# Patient Record
Sex: FEMALE | Race: WHITE | NOT HISPANIC OR LATINO | Employment: OTHER | URBAN - NONMETROPOLITAN AREA
[De-identification: names, ages, dates, MRNs, and addresses within clinical notes are randomized per-mention and may not be internally consistent; named-entity substitution may affect disease eponyms.]

---

## 2018-09-05 ENCOUNTER — TRANSCRIBE ORDERS (OUTPATIENT)
Dept: ADMINISTRATIVE | Facility: HOSPITAL | Age: 63
End: 2018-09-05

## 2018-09-05 ENCOUNTER — HOSPITAL ENCOUNTER (OUTPATIENT)
Dept: RADIATION ONCOLOGY | Facility: HOSPITAL | Age: 63
Setting detail: RADIATION/ONCOLOGY SERIES
End: 2018-09-05

## 2018-09-05 DIAGNOSIS — N63.0 BREAST NODULE: Primary | ICD-10-CM

## 2018-09-07 ENCOUNTER — APPOINTMENT (OUTPATIENT)
Dept: PREADMISSION TESTING | Facility: HOSPITAL | Age: 63
End: 2018-09-07

## 2018-09-07 VITALS
HEIGHT: 67 IN | WEIGHT: 142.64 LBS | OXYGEN SATURATION: 97 % | SYSTOLIC BLOOD PRESSURE: 130 MMHG | BODY MASS INDEX: 22.39 KG/M2 | RESPIRATION RATE: 18 BRPM | DIASTOLIC BLOOD PRESSURE: 78 MMHG | HEART RATE: 84 BPM

## 2018-09-07 LAB
ALBUMIN SERPL-MCNC: 4.3 G/DL (ref 3.5–5)
ALBUMIN/GLOB SERPL: 1.6 G/DL (ref 1.1–2.5)
ALP SERPL-CCNC: 69 U/L (ref 24–120)
ALT SERPL W P-5'-P-CCNC: 19 U/L (ref 0–54)
ANION GAP SERPL CALCULATED.3IONS-SCNC: 9 MMOL/L (ref 4–13)
AST SERPL-CCNC: 24 U/L (ref 7–45)
BASOPHILS # BLD AUTO: 0.03 10*3/MM3 (ref 0–0.2)
BASOPHILS NFR BLD AUTO: 0.5 % (ref 0–2)
BILIRUB SERPL-MCNC: 0.5 MG/DL (ref 0.1–1)
BUN BLD-MCNC: 18 MG/DL (ref 5–21)
BUN/CREAT SERPL: 26.9 (ref 7–25)
CALCIUM SPEC-SCNC: 9 MG/DL (ref 8.4–10.4)
CHLORIDE SERPL-SCNC: 105 MMOL/L (ref 98–110)
CO2 SERPL-SCNC: 30 MMOL/L (ref 24–31)
CREAT BLD-MCNC: 0.67 MG/DL (ref 0.5–1.4)
DEPRECATED RDW RBC AUTO: 40.5 FL (ref 40–54)
EOSINOPHIL # BLD AUTO: 0.06 10*3/MM3 (ref 0–0.7)
EOSINOPHIL NFR BLD AUTO: 1 % (ref 0–4)
ERYTHROCYTE [DISTWIDTH] IN BLOOD BY AUTOMATED COUNT: 12.9 % (ref 12–15)
GFR SERPL CREATININE-BSD FRML MDRD: 89 ML/MIN/1.73
GLOBULIN UR ELPH-MCNC: 2.7 GM/DL
GLUCOSE BLD-MCNC: 82 MG/DL (ref 70–100)
HCT VFR BLD AUTO: 41.7 % (ref 37–47)
HGB BLD-MCNC: 13.7 G/DL (ref 12–16)
IMM GRANULOCYTES # BLD: 0.02 10*3/MM3 (ref 0–0.03)
IMM GRANULOCYTES NFR BLD: 0.3 % (ref 0–5)
LYMPHOCYTES # BLD AUTO: 1.49 10*3/MM3 (ref 0.72–4.86)
LYMPHOCYTES NFR BLD AUTO: 25.5 % (ref 15–45)
MCH RBC QN AUTO: 28.2 PG (ref 28–32)
MCHC RBC AUTO-ENTMCNC: 32.9 G/DL (ref 33–36)
MCV RBC AUTO: 85.8 FL (ref 82–98)
MONOCYTES # BLD AUTO: 0.56 10*3/MM3 (ref 0.19–1.3)
MONOCYTES NFR BLD AUTO: 9.6 % (ref 4–12)
NEUTROPHILS # BLD AUTO: 3.68 10*3/MM3 (ref 1.87–8.4)
NEUTROPHILS NFR BLD AUTO: 63.1 % (ref 39–78)
NRBC BLD MANUAL-RTO: 0 /100 WBC (ref 0–0)
PLATELET # BLD AUTO: 214 10*3/MM3 (ref 130–400)
PMV BLD AUTO: 10 FL (ref 6–12)
POTASSIUM BLD-SCNC: 4.3 MMOL/L (ref 3.5–5.3)
PROT SERPL-MCNC: 7 G/DL (ref 6.3–8.7)
RBC # BLD AUTO: 4.86 10*6/MM3 (ref 4.2–5.4)
SODIUM BLD-SCNC: 144 MMOL/L (ref 135–145)
WBC NRBC COR # BLD: 5.84 10*3/MM3 (ref 4.8–10.8)

## 2018-09-07 PROCEDURE — 93005 ELECTROCARDIOGRAM TRACING: CPT

## 2018-09-07 PROCEDURE — 80053 COMPREHEN METABOLIC PANEL: CPT | Performed by: SPECIALIST

## 2018-09-07 PROCEDURE — 93010 ELECTROCARDIOGRAM REPORT: CPT | Performed by: INTERNAL MEDICINE

## 2018-09-07 PROCEDURE — 85025 COMPLETE CBC W/AUTO DIFF WBC: CPT | Performed by: SPECIALIST

## 2018-09-07 PROCEDURE — 36415 COLL VENOUS BLD VENIPUNCTURE: CPT

## 2018-09-07 RX ORDER — ASPIRIN 81 MG/1
81 TABLET, CHEWABLE ORAL DAILY
COMMUNITY

## 2018-09-07 RX ORDER — BIOTIN 1000 MCG
TABLET,CHEWABLE ORAL
COMMUNITY
End: 2020-04-30

## 2018-09-07 RX ORDER — SODIUM PHOSPHATE,MONO-DIBASIC 19G-7G/118
ENEMA (ML) RECTAL
COMMUNITY

## 2018-09-07 NOTE — DISCHARGE INSTRUCTIONS
DAY OF SURGERY INSTRUCTIONS        YOUR SURGEON: DR SAFIA EDOUARD    PROCEDURE: RIGHT BREAST LUMPECTOMY WITH SENTINEL LYMPH NODE WITH BIOPSY    DATE OF SURGERY: September 10, 2018    ARRIVAL TIME: AS DIRECTED BY OFFICE    DAY OF SURGERY TAKE ONLY THESE MEDICATIONS UNLESS OTHERWISE INSTRUCTED BY YOUR PHYSICIAN: NONE          MANAGING PAIN AFTER SURGERY    We know you are probably wondering what your pain will be like after surgery.  Following surgery it is unrealistic to expect you will not have pain.   Pain is how our bodies let us know that something is wrong or cautions us to be careful.  That said, our goal is to make your pain tolerable.    Methods we may use to treat your pain include (oral or IV medications, PCAs, epidurals, nerve blocks, etc.)   While some procedures require IV pain medications for a short time after surgery, transitioning to pain medications by mouth allows for better management of pain.   Your nurse will encourage you to take oral pain medications whenever possible.  IV medications work almost immediately, but only last a short while.  Taking medications by mouth allows for a more constant level of medication in your blood stream for a longer period of time.      Once your pain is out of control it is harder to get back under control.  It is important you are aware when your next dose of pain medication is due.  If you are admitted, your nurse may write the time of your next dose on the white board in your room to help you remember.      We are interested in your pain and encourage you to inform us about aggravating factors during your visit.   Many times a simple repositioning every few hours can make a big difference.    If your physician says it is okay, do not let your pain prevent you from getting out of bed. Be sure to call your nurse for assistance prior to getting up so you do not fall.      Before surgery, please decide your tolerable pain goal.  These faces help describe the  pain ratings we use on a 0-10 scale.   Be prepared to tell us your goal and whether or not you take pain or anxiety medications at home.          BEFORE YOU COME TO THE HOSPITAL  (Pre-op instructions)  • Do not eat, drink, smoke or chew gum after midnight the night before surgery.  This also includes no mints.  • Morning of surgery take only the medicines you have been instructed with a sip of water unless otherwise instructed  by your physician.  • Do not shave, wear makeup or dark nail polish.  • Remove all jewelry including rings.  • Leave anything you consider valuable at home.  • Leave your suitcase in the car until after your surgery.  • Bring the following with you if applicable:  o Picture ID and insurance, Medicare or Medicaid cards  o Co-pay/deductible required by insurance (cash, check, credit card)  o Copy of advance directive, living will or power-of- documents if not brought to PAT  o CPAP or BIPAP mask and tubing  o Relaxation aids (MP3 player, book, magazine)  • On the day of surgery check in at registration located at the main entrance of the hospital.       Outpatient Surgery Guidelines, Adult  Outpatient procedures are those for which the person having the procedure is allowed to go home the same day as the procedure. Various procedures are done on an outpatient basis. You should follow some general guidelines if you will be having an outpatient procedure.  LET YOUR HEALTH CARE PROVIDER KNOW ABOUT:  · Any allergies you have.  · All medicines you are taking, including vitamins, herbs, eye drops, creams, and over-the-counter medicines.  · Previous problems you or members of your family have had with the use of anesthetics.  · Any blood disorders you have.  · Previous surgeries you have had.  · Medical conditions you have.  RISKS AND COMPLICATIONS  Your health care provider will discuss possible risks and complications with you before surgery. Common risks and complications include:     · Problems due to the use of anesthetics.  · Blood loss and replacement (does not apply to minor surgical procedures).  · Temporary increase in pain due to surgery.  · Uncorrected pain or problems that the surgery was meant to correct.  · Infection.  · New damage.  BEFORE THE PROCEDURE  · Ask your health care provider about changing or stopping your regular medicines. You may need to stop taking certain medicines in the days or weeks before the procedure.  · Stop smoking at least 2 weeks before surgery. This lowers your risk for complications during and after surgery. Ask your health care provider for help with this if needed.  · Eat your usual meals and a light supper the day before surgery. Continue fluid intake. Do not drink alcohol.  · Do not eat or drink after midnight the night before your surgery.   · Arrange for someone to take you home and to stay with you for 24 hours after the procedure. Medicine given for your procedure may affect your ability to drive or to care for yourself.  · Call your health care provider's office if you develop an illness or problem that may prevent you from safely having your procedure.  AFTER THE PROCEDURE  After surgery, you will be taken to a recovery area, where your progress will be monitored. If there are no complications, you will be allowed to go home when you are awake, stable, and taking fluids well. You may have numbness around the surgical site. Healing will take some time. You will have tenderness at the surgical site and may have some swelling and bruising. You may also have some nausea.  HOME CARE INSTRUCTIONS  · Do not drive for 24 hours, or as directed by your health care provider. Do not drive while taking prescription pain medicines.  · Do not drink alcohol for 24 hours.  · Do not make important decisions or sign legal documents for 24 hours.  · You may resume a normal diet and activities as directed.  · Do not lift anything heavier than 10 pounds (4.5 kg) or  play contact sports until your health care provider says it is okay.  · Change your bandages (dressings) as directed.  · Only take over-the-counter or prescription medicines as directed by your health care provider.  · Follow up with your health care provider as directed.  SEEK MEDICAL CARE IF:  · You have increased bleeding (more than a small spot) from the surgical site.  · You have redness, swelling, or increasing pain in the wound.  · You see pus coming from the wound.  · You have a fever.  · You notice a bad smell coming from the wound or dressing.  · You feel lightheaded or faint.  · You develop a rash.  · You have trouble breathing.  · You develop allergies.  MAKE SURE YOU:  · Understand these instructions.  · Will watch your condition.  · Will get help right away if you are not doing well or get worse.     This information is not intended to replace advice given to you by your health care provider. Make sure you discuss any questions you have with your health care provider.     Document Released: 09/12/2002 Document Revised: 05/03/2016 Document Reviewed: 05/22/2014  [employer] Interactive Patient Education ©2016 [employer]       Fall Prevention in Hospitals, Adult  As a hospital patient, your condition and the treatments you receive can increase your risk for falls. Some additional risk factors for falls in a hospital include:  · Being in an unfamiliar environment.  · Being on bed rest.  · Your surgery.  · Taking certain medicines.  · Your tubing requirements, such as intravenous (IV) therapy or catheters.  It is important that you learn how to decrease fall risks while at the hospital. Below are important tips that can help prevent falls.  SAFETY TIPS FOR PREVENTING FALLS  Talk about your risk of falling.  · Ask your health care provider why you are at risk for falling. Is it your medicine, illness, tubing placement, or something else?  · Make a plan with your health care provider to keep you safe from  falls.  · Ask your health care provider or pharmacist about side effects of your medicines. Some medicines can make you dizzy or affect your coordination.  Ask for help.  · Ask for help before getting out of bed. You may need to press your call button.  · Ask for assistance in getting safely to the toilet.  · Ask for a walker or cane to be put at your bedside. Ask that most of the side rails on your bed be placed up before your health care provider leaves the room.  · Ask family or friends to sit with you.  · Ask for things that are out of your reach, such as your glasses, hearing aids, telephone, bedside table, or call button.  Follow these tips to avoid falling:  · Stay lying or seated, rather than standing, while waiting for help.  · Wear rubber-soled slippers or shoes whenever you walk in the hospital.  · Avoid quick, sudden movements.  ¨ Change positions slowly.  ¨ Sit on the side of your bed before standing.  ¨ Stand up slowly and wait before you start to walk.  · Let your health care provider know if there is a spill on the floor.  · Pay careful attention to the medical equipment, electrical cords, and tubes around you.  · When you need help, use your call button by your bed or in the bathroom. Wait for one of your health care providers to help you.  · If you feel dizzy or unsure of your footing, return to bed and wait for assistance.  · Avoid being distracted by the TV, telephone, or another person in your room.  · Do not lean or support yourself on rolling objects, such as IV poles or bedside tables.     This information is not intended to replace advice given to you by your health care provider. Make sure you discuss any questions you have with your health care provider.     Document Released: 12/15/2001 Document Revised: 01/08/2016 Document Reviewed: 08/25/2013  [employer] Interactive Patient Education ©2016 [employer]       Surgical Site Infections FAQs  What is a Surgical Site Infection (SSI)?  A  surgical site infection is an infection that occurs after surgery in the part of the body where the surgery took place. Most patients who have surgery do not develop an infection. However, infections develop in about 1 to 3 out of every 100 patients who have surgery.  Some of the common symptoms of a surgical site infection are:  · Redness and pain around the area where you had surgery  · Drainage of cloudy fluid from your surgical wound  · Fever  Can SSIs be treated?  Yes. Most surgical site infections can be treated with antibiotics. The antibiotic given to you depends on the bacteria (germs) causing the infection. Sometimes patients with SSIs also need another surgery to treat the infection.  What are some of the things that hospitals are doing to prevent SSIs?  To prevent SSIs, doctors, nurses, and other healthcare providers:  · Clean their hands and arms up to their elbows with an antiseptic agent just before the surgery.  · Clean their hands with soap and water or an alcohol-based hand rub before and after caring for each patient.  · May remove some of your hair immediately before your surgery using electric clippers if the hair is in the same area where the procedure will occur. They should not shave you with a razor.  · Wear special hair covers, masks, gowns, and gloves during surgery to keep the surgery area clean.  · Give you antibiotics before your surgery starts. In most cases, you should get antibiotics within 60 minutes before the surgery starts and the antibiotics should be stopped within 24 hours after surgery.  · Clean the skin at the site of your surgery with a special soap that kills germs.  What can I do to help prevent SSIs?  Before your surgery:  · Tell your doctor about other medical problems you may have. Health problems such as allergies, diabetes, and obesity could affect your surgery and your treatment.  · Quit smoking. Patients who smoke get more infections. Talk to your doctor about how  you can quit before your surgery.  · Do not shave near where you will have surgery. Shaving with a razor can irritate your skin and make it easier to develop an infection.  At the time of your surgery:  · Speak up if someone tries to shave you with a razor before surgery. Ask why you need to be shaved and talk with your surgeon if you have any concerns.  · Ask if you will get antibiotics before surgery.  After your surgery:  · Make sure that your healthcare providers clean their hands before examining you, either with soap and water or an alcohol-based hand rub.  · If you do not see your providers clean their hands, please ask them to do so.  · Family and friends who visit you should not touch the surgical wound or dressings.  · Family and friends should clean their hands with soap and water or an alcohol-based hand rub before and after visiting you. If you do not see them clean their hands, ask them to clean their hands.  What do I need to do when I go home from the hospital?  · Before you go home, your doctor or nurse should explain everything you need to know about taking care of your wound. Make sure you understand how to care for your wound before you leave the hospital.  · Always clean your hands before and after caring for your wound.  · Before you go home, make sure you know who to contact if you have questions or problems after you get home.  · If you have any symptoms of an infection, such as redness and pain at the surgery site, drainage, or fever, call your doctor immediately.  If you have additional questions, please ask your doctor or nurse.  Developed and co-sponsored by The Society for Healthcare Epidemiology of [place] (SHEA); Infectious Diseases Society of [place] (IDSA); American Hospital Association; Association for Professionals in Infection Control and Epidemiology (APIC); Centers for Disease Control and Prevention (CDC); and The Joint Commission.     This information is not intended to replace  advice given to you by your health care provider. Make sure you discuss any questions you have with your health care provider.     Document Released: 12/23/2014 Document Revised: 01/08/2016 Document Reviewed: 03/02/2016  [employer] Interactive Patient Education ©2016 [employer]       [facility]  CHG 4% Patient Instruction Sheet    Preparing the Skin Before Surgery  Preparing or “prepping” skin before surgery can reduce the risk of infection at the surgical site. To make the process easier,[facility] has chosen 4% Chlorhexidine Gluconate (CHG) antiseptic solution.   The steps below outline the prepping process and should be carefully followed.                                                                                                                                                      Prep the skin at the following time(s):                                                      We recommend you shower the night before surgery, and again the morning of surgery with the 4% CHG antiseptic solution using half of the bottle and a cloth each time.  Dress in clean clothes/sleepwear after showering.  See instructions below for application.          Do not apply any lotions or moisturizers.       Do not shave the area to be prepped for at least 2 days prior to surgery.    Clipping the hair may be done immediately prior to your surgery at the hospital    if needed.    Directions:  Thoroughly rinse your body with water.  Apply 4% CHG to a cloth and wash skin gently, paying special attention to the operative site.  Rinse again thoroughly.  Once you have begun using this product do not apply anything else to your skin. If itching or redness persists, rinse affected areas and discontinue use.    When using this product:  • Keep out of eyes, ears, and mouth.  • If solution should contact these areas, rinse out promptly and thoroughly with water.  • For external use only.  • Do not use in genital area, on your face or  head.      PATIENT/FAMILY/RESPONSIBLE PARTY VERBALIZES UNDERSTANDING OF ABOVE EDUCATION.  COPY OF PAIN SCALE GIVEN AND REVIEWED WITH VERBALIZED UNDERSTANDING.

## 2018-09-10 ENCOUNTER — ANESTHESIA EVENT (OUTPATIENT)
Dept: PERIOP | Facility: HOSPITAL | Age: 63
End: 2018-09-10

## 2018-09-10 ENCOUNTER — ANESTHESIA (OUTPATIENT)
Dept: PERIOP | Facility: HOSPITAL | Age: 63
End: 2018-09-10

## 2018-09-10 ENCOUNTER — HOSPITAL ENCOUNTER (OUTPATIENT)
Dept: NUCLEAR MEDICINE | Facility: HOSPITAL | Age: 63
Discharge: HOME | End: 2018-09-10
Attending: SPECIALIST

## 2018-09-10 ENCOUNTER — HOSPITAL ENCOUNTER (OUTPATIENT)
Facility: HOSPITAL | Age: 63
Setting detail: HOSPITAL OUTPATIENT SURGERY
Discharge: HOME | End: 2018-09-10
Attending: SPECIALIST | Admitting: SPECIALIST

## 2018-09-10 VITALS
RESPIRATION RATE: 16 BRPM | TEMPERATURE: 98.1 F | HEART RATE: 85 BPM | DIASTOLIC BLOOD PRESSURE: 61 MMHG | OXYGEN SATURATION: 96 % | SYSTOLIC BLOOD PRESSURE: 118 MMHG

## 2018-09-10 DIAGNOSIS — N63.0 BREAST NODULE: ICD-10-CM

## 2018-09-10 PROCEDURE — 25010000002 DEXAMETHASONE PER 1 MG: Performed by: NURSE ANESTHETIST, CERTIFIED REGISTERED

## 2018-09-10 PROCEDURE — 0 TECHNETIUM FILTERED SULFUR COLLOID: Performed by: SPECIALIST

## 2018-09-10 PROCEDURE — A9541 TC99M SULFUR COLLOID: HCPCS | Performed by: SPECIALIST

## 2018-09-10 PROCEDURE — 25010000002 FENTANYL CITRATE (PF) 250 MCG/5ML SOLUTION: Performed by: NURSE ANESTHETIST, CERTIFIED REGISTERED

## 2018-09-10 PROCEDURE — 25010000002 PROPOFOL 10 MG/ML EMULSION: Performed by: NURSE ANESTHETIST, CERTIFIED REGISTERED

## 2018-09-10 PROCEDURE — 25010000002 VANCOMYCIN PER 500 MG: Performed by: SPECIALIST

## 2018-09-10 PROCEDURE — 88307 TISSUE EXAM BY PATHOLOGIST: CPT | Performed by: SPECIALIST

## 2018-09-10 PROCEDURE — 25010000002 MIDAZOLAM PER 1 MG: Performed by: ANESTHESIOLOGY

## 2018-09-10 PROCEDURE — 78195 LYMPH SYSTEM IMAGING: CPT

## 2018-09-10 PROCEDURE — 25010000002 ONDANSETRON PER 1 MG: Performed by: NURSE ANESTHETIST, CERTIFIED REGISTERED

## 2018-09-10 RX ORDER — SCOLOPAMINE TRANSDERMAL SYSTEM 1 MG/1
1 PATCH, EXTENDED RELEASE TRANSDERMAL ONCE
Status: DISCONTINUED | OUTPATIENT
Start: 2018-09-10 | End: 2018-09-10 | Stop reason: HOSPADM

## 2018-09-10 RX ORDER — ONDANSETRON 2 MG/ML
INJECTION INTRAMUSCULAR; INTRAVENOUS AS NEEDED
Status: DISCONTINUED | OUTPATIENT
Start: 2018-09-10 | End: 2018-09-10 | Stop reason: SURG

## 2018-09-10 RX ORDER — HYDROCODONE BITARTRATE AND ACETAMINOPHEN 7.5; 325 MG/1; MG/1
1 TABLET ORAL EVERY 4 HOURS PRN
Qty: 40 TABLET | Refills: 0 | Status: SHIPPED | OUTPATIENT
Start: 2018-09-10 | End: 2019-05-13

## 2018-09-10 RX ORDER — MEPERIDINE HYDROCHLORIDE 25 MG/ML
12.5 INJECTION INTRAMUSCULAR; INTRAVENOUS; SUBCUTANEOUS
Status: DISCONTINUED | OUTPATIENT
Start: 2018-09-10 | End: 2018-09-10 | Stop reason: HOSPADM

## 2018-09-10 RX ORDER — OXYCODONE AND ACETAMINOPHEN 10; 325 MG/1; MG/1
1 TABLET ORAL ONCE AS NEEDED
Status: DISCONTINUED | OUTPATIENT
Start: 2018-09-10 | End: 2018-09-10 | Stop reason: HOSPADM

## 2018-09-10 RX ORDER — ACETAMINOPHEN 500 MG
1000 TABLET ORAL ONCE
Status: COMPLETED | OUTPATIENT
Start: 2018-09-10 | End: 2018-09-10

## 2018-09-10 RX ORDER — MIDAZOLAM HYDROCHLORIDE 1 MG/ML
1 INJECTION INTRAMUSCULAR; INTRAVENOUS
Status: DISCONTINUED | OUTPATIENT
Start: 2018-09-10 | End: 2018-09-10 | Stop reason: HOSPADM

## 2018-09-10 RX ORDER — PROPOFOL 10 MG/ML
VIAL (ML) INTRAVENOUS AS NEEDED
Status: DISCONTINUED | OUTPATIENT
Start: 2018-09-10 | End: 2018-09-10 | Stop reason: SURG

## 2018-09-10 RX ORDER — FENTANYL CITRATE 50 UG/ML
25 INJECTION, SOLUTION INTRAMUSCULAR; INTRAVENOUS AS NEEDED
Status: DISCONTINUED | OUTPATIENT
Start: 2018-09-10 | End: 2018-09-10 | Stop reason: HOSPADM

## 2018-09-10 RX ORDER — MIDAZOLAM HYDROCHLORIDE 1 MG/ML
2 INJECTION INTRAMUSCULAR; INTRAVENOUS
Status: DISCONTINUED | OUTPATIENT
Start: 2018-09-10 | End: 2018-09-10 | Stop reason: HOSPADM

## 2018-09-10 RX ORDER — LIDOCAINE HYDROCHLORIDE 20 MG/ML
INJECTION, SOLUTION INFILTRATION; PERINEURAL AS NEEDED
Status: DISCONTINUED | OUTPATIENT
Start: 2018-09-10 | End: 2018-09-10 | Stop reason: SURG

## 2018-09-10 RX ORDER — IBUPROFEN 600 MG/1
600 TABLET ORAL ONCE AS NEEDED
Status: DISCONTINUED | OUTPATIENT
Start: 2018-09-10 | End: 2018-09-10 | Stop reason: HOSPADM

## 2018-09-10 RX ORDER — SULFAMETHOXAZOLE AND TRIMETHOPRIM 800; 160 MG/1; MG/1
1 TABLET ORAL 2 TIMES DAILY
Qty: 20 TABLET | Refills: 0 | Status: SHIPPED | OUTPATIENT
Start: 2018-09-10 | End: 2018-09-20

## 2018-09-10 RX ORDER — NALOXONE HCL 0.4 MG/ML
0.4 VIAL (ML) INJECTION AS NEEDED
Status: DISCONTINUED | OUTPATIENT
Start: 2018-09-10 | End: 2018-09-10 | Stop reason: HOSPADM

## 2018-09-10 RX ORDER — ONDANSETRON HCL 8 MG
8 TABLET ORAL EVERY 8 HOURS PRN
Qty: 10 TABLET | Refills: 1 | Status: SHIPPED | OUTPATIENT
Start: 2018-09-10 | End: 2019-05-13

## 2018-09-10 RX ORDER — LIDOCAINE AND PRILOCAINE 25; 25 MG/G; MG/G
CREAM TOPICAL ONCE
Status: COMPLETED | OUTPATIENT
Start: 2018-09-10 | End: 2018-09-10

## 2018-09-10 RX ORDER — LABETALOL HYDROCHLORIDE 5 MG/ML
5 INJECTION, SOLUTION INTRAVENOUS
Status: DISCONTINUED | OUTPATIENT
Start: 2018-09-10 | End: 2018-09-10 | Stop reason: HOSPADM

## 2018-09-10 RX ORDER — FENTANYL CITRATE 50 UG/ML
INJECTION, SOLUTION INTRAMUSCULAR; INTRAVENOUS AS NEEDED
Status: DISCONTINUED | OUTPATIENT
Start: 2018-09-10 | End: 2018-09-10 | Stop reason: SURG

## 2018-09-10 RX ORDER — OXYCODONE AND ACETAMINOPHEN 7.5; 325 MG/1; MG/1
2 TABLET ORAL ONCE AS NEEDED
Status: DISCONTINUED | OUTPATIENT
Start: 2018-09-10 | End: 2018-09-10 | Stop reason: HOSPADM

## 2018-09-10 RX ORDER — SODIUM CHLORIDE 0.9 % (FLUSH) 0.9 %
1-10 SYRINGE (ML) INJECTION AS NEEDED
Status: DISCONTINUED | OUTPATIENT
Start: 2018-09-10 | End: 2018-09-10 | Stop reason: HOSPADM

## 2018-09-10 RX ORDER — ONDANSETRON 2 MG/ML
4 INJECTION INTRAMUSCULAR; INTRAVENOUS ONCE AS NEEDED
Status: DISCONTINUED | OUTPATIENT
Start: 2018-09-10 | End: 2018-09-10 | Stop reason: HOSPADM

## 2018-09-10 RX ORDER — SODIUM CHLORIDE 0.9 % (FLUSH) 0.9 %
3 SYRINGE (ML) INJECTION AS NEEDED
Status: DISCONTINUED | OUTPATIENT
Start: 2018-09-10 | End: 2018-09-10 | Stop reason: HOSPADM

## 2018-09-10 RX ORDER — SODIUM CHLORIDE, SODIUM LACTATE, POTASSIUM CHLORIDE, CALCIUM CHLORIDE 600; 310; 30; 20 MG/100ML; MG/100ML; MG/100ML; MG/100ML
1000 INJECTION, SOLUTION INTRAVENOUS CONTINUOUS
Status: DISCONTINUED | OUTPATIENT
Start: 2018-09-10 | End: 2018-09-10 | Stop reason: HOSPADM

## 2018-09-10 RX ORDER — MAGNESIUM HYDROXIDE 1200 MG/15ML
LIQUID ORAL AS NEEDED
Status: DISCONTINUED | OUTPATIENT
Start: 2018-09-10 | End: 2018-09-10 | Stop reason: HOSPADM

## 2018-09-10 RX ORDER — IPRATROPIUM BROMIDE AND ALBUTEROL SULFATE 2.5; .5 MG/3ML; MG/3ML
3 SOLUTION RESPIRATORY (INHALATION) ONCE AS NEEDED
Status: DISCONTINUED | OUTPATIENT
Start: 2018-09-10 | End: 2018-09-10 | Stop reason: HOSPADM

## 2018-09-10 RX ORDER — SODIUM CHLORIDE, SODIUM LACTATE, POTASSIUM CHLORIDE, CALCIUM CHLORIDE 600; 310; 30; 20 MG/100ML; MG/100ML; MG/100ML; MG/100ML
100 INJECTION, SOLUTION INTRAVENOUS CONTINUOUS
Status: DISCONTINUED | OUTPATIENT
Start: 2018-09-10 | End: 2018-09-10 | Stop reason: HOSPADM

## 2018-09-10 RX ORDER — DEXAMETHASONE SODIUM PHOSPHATE 4 MG/ML
INJECTION, SOLUTION INTRA-ARTICULAR; INTRALESIONAL; INTRAMUSCULAR; INTRAVENOUS; SOFT TISSUE AS NEEDED
Status: DISCONTINUED | OUTPATIENT
Start: 2018-09-10 | End: 2018-09-10 | Stop reason: SURG

## 2018-09-10 RX ORDER — FAMOTIDINE 10 MG/ML
20 INJECTION, SOLUTION INTRAVENOUS
Status: DISCONTINUED | OUTPATIENT
Start: 2018-09-10 | End: 2018-09-10 | Stop reason: HOSPADM

## 2018-09-10 RX ORDER — METOCLOPRAMIDE HYDROCHLORIDE 5 MG/ML
5 INJECTION INTRAMUSCULAR; INTRAVENOUS
Status: DISCONTINUED | OUTPATIENT
Start: 2018-09-10 | End: 2018-09-10 | Stop reason: HOSPADM

## 2018-09-10 RX ADMIN — SCOPOLAMINE 1 PATCH: 1 PATCH, EXTENDED RELEASE TRANSDERMAL at 09:37

## 2018-09-10 RX ADMIN — EPHEDRINE SULFATE 10 MG: 50 INJECTION INTRAMUSCULAR; INTRAVENOUS; SUBCUTANEOUS at 12:34

## 2018-09-10 RX ADMIN — FENTANYL CITRATE 100 MCG: 50 INJECTION INTRAMUSCULAR; INTRAVENOUS at 11:04

## 2018-09-10 RX ADMIN — LIDOCAINE AND PRILOCAINE: 25; 25 CREAM TOPICAL at 07:16

## 2018-09-10 RX ADMIN — TECHNETIUM TC 99M SULFUR COLLOID 1 DOSE: KIT at 08:15

## 2018-09-10 RX ADMIN — LIDOCAINE HYDROCHLORIDE 0.5 ML: 10 INJECTION, SOLUTION EPIDURAL; INFILTRATION; INTRACAUDAL; PERINEURAL at 06:30

## 2018-09-10 RX ADMIN — PROPOFOL 150 MG: 10 INJECTION, EMULSION INTRAVENOUS at 11:04

## 2018-09-10 RX ADMIN — ACETAMINOPHEN 1000 MG: 500 TABLET, FILM COATED ORAL at 09:37

## 2018-09-10 RX ADMIN — FAMOTIDINE 20 MG: 10 INJECTION INTRAVENOUS at 09:37

## 2018-09-10 RX ADMIN — FENTANYL CITRATE 50 MCG: 50 INJECTION INTRAMUSCULAR; INTRAVENOUS at 12:50

## 2018-09-10 RX ADMIN — LIDOCAINE HYDROCHLORIDE 100 MG: 20 INJECTION, SOLUTION INFILTRATION; PERINEURAL at 11:04

## 2018-09-10 RX ADMIN — FENTANYL CITRATE 25 MCG: 50 INJECTION INTRAMUSCULAR; INTRAVENOUS at 12:12

## 2018-09-10 RX ADMIN — FENTANYL CITRATE 25 MCG: 50 INJECTION INTRAMUSCULAR; INTRAVENOUS at 11:28

## 2018-09-10 RX ADMIN — ONDANSETRON HYDROCHLORIDE 4 MG: 2 SOLUTION INTRAMUSCULAR; INTRAVENOUS at 12:21

## 2018-09-10 RX ADMIN — VANCOMYCIN HYDROCHLORIDE 1000 MG: 1 INJECTION, POWDER, LYOPHILIZED, FOR SOLUTION INTRAVENOUS at 09:45

## 2018-09-10 RX ADMIN — EPHEDRINE SULFATE 15 MG: 50 INJECTION INTRAMUSCULAR; INTRAVENOUS; SUBCUTANEOUS at 11:36

## 2018-09-10 RX ADMIN — DEXAMETHASONE SODIUM PHOSPHATE 4 MG: 4 INJECTION, SOLUTION INTRAMUSCULAR; INTRAVENOUS at 12:30

## 2018-09-10 RX ADMIN — FENTANYL CITRATE 50 MCG: 50 INJECTION INTRAMUSCULAR; INTRAVENOUS at 12:45

## 2018-09-10 RX ADMIN — SODIUM CHLORIDE, POTASSIUM CHLORIDE, SODIUM LACTATE AND CALCIUM CHLORIDE 1000 ML: 600; 310; 30; 20 INJECTION, SOLUTION INTRAVENOUS at 06:30

## 2018-09-10 RX ADMIN — EPHEDRINE SULFATE 10 MG: 50 INJECTION INTRAMUSCULAR; INTRAVENOUS; SUBCUTANEOUS at 12:21

## 2018-09-10 RX ADMIN — SODIUM CHLORIDE, POTASSIUM CHLORIDE, SODIUM LACTATE AND CALCIUM CHLORIDE: 600; 310; 30; 20 INJECTION, SOLUTION INTRAVENOUS at 11:43

## 2018-09-10 NOTE — ANESTHESIA POSTPROCEDURE EVALUATION
Patient: Adriana Samuels    Procedure Summary     Date:  09/10/18 Room / Location:   [facility] OR  /  [facility]    Anesthesia Start:  1057 Anesthesia Stop:  1251    Procedure:  RIGHT BREAST LUMPECTOMY WITH SENTINEL LYMPH NODE BIOPSY, INJECTION AND SCAN (Right Breast) Diagnosis:  (RIGHT BREAST NODULE)    Surgeon:  Duglas Lawrence MD Provider:  Marj Goodwin CRNA    Anesthesia Type:  general ASA Status:  2          Anesthesia Type: general  Last vitals  BP   134/63 (09/10/18 1330)   Temp   98.1 °F (36.7 °C) (09/10/18 1317)   Pulse   86 (09/10/18 1330)   Resp   16 (09/10/18 1330)     SpO2   93 % (09/10/18 1330)     Post Anesthesia Care and Evaluation    Patient location during evaluation: PACU  Patient participation: complete - patient participated  Level of consciousness: awake and alert  Pain management: adequate  Airway patency: patent  Anesthetic complications: No anesthetic complications  PONV Status: none  Cardiovascular status: acceptable and hemodynamically stable  Respiratory status: acceptable  Hydration status: acceptable    Comments: Blood pressure 134/63, pulse 86, temperature 98.1 °F (36.7 °C), temperature source Temporal Artery , resp. rate 16, SpO2 93 %.    Patient discharged from PACU based upon Gold score. Please see RN notes for further details

## 2018-09-10 NOTE — DISCHARGE INSTRUCTIONS

## 2018-09-10 NOTE — OP NOTE
BREAST LUMPECTOMY WITH SENTINEL NODE BIOPSY  Procedure Note    Adriana Samuels  9/10/2018    Pre-op Diagnosis:   RIGHT BREAST NODULE    Post-op Diagnosis:   Right breast nodule upper outer breast also sentinel lymph node  Procedure/CPT® Codes:      Procedure(s):  RIGHT BREAST LUMPECTOMY WITH SENTINEL LYMPH NODE BIOPSY, INJECTION AND SCAN    Surgeon(s):  Duglas Lawrence MD    Anesthesia: General    Staff:   Circulator: Veda Medrano RN; Audra Gilmore RN  Scrub Person: Marj De La Rosa Zachary A; Roma Strange  Assistant: Daniel Talamantes  Other: Karolina Dougherty    Estimated Blood Loss: 50 mL    Specimens:                ID Type Source Tests Collected by Time   A : TISUE RIGHT BREAST SENTINEL NODE BX Tissue Breast, Right TISSUE PATHOLOGY EXAM Dugals Lawrence MD 9/10/2018 1210         Drains:   Closed/Suction Drain 1 Right;Lateral Breast Bulb 10 Fr. (Active)       Indications: Adriana Samuels is a 63-year-old female who has a nodule in her right upper outer breast.  Initial mammogram 1315 years prior Dr. Penn's office no abnormalities at that time and no mammograms since.  Recently she has a 5 x 6 mm nodule at the 10 o'clock position noted by ultrasound is very suspicious for malignancy.  There is a palpable lesion at this site consistent with the findings on mammogram she is  3 para 3family history, no breast feeding, no discharge no erythema.  She is advised of findings as well as the risk and benefits and desires to proceed with surgery options are to obtain a biopsy of the area versus excision with the area present I would definitely suggest excision if the biopsies were negative currently plan to excise the area as a lumpectomy and also complete a sentinel lymph node evaluation.  She is aware of the procedure the risk and benefits and gives her informed consent for surgery.      Findings: Right upper outer breast lumpectomy completed with Sobieski lymph node evaluation  accomplished blood loss was less than 50 mL.  A 10 mm flat Varun-Moya drain was placed without difficulty.      Complications-none    Procedure: Patient is placed in the supine position the surgical suite and after prepping and draping of been completed with alcohol and Betadine and Ioban was placed around the periphery.  A timeout was completed to assure orientation and patient  With this accomplished a curvilinear incision was made in the upper outer aspect of the right breast excising the overlying skin with the radio nuclide was injected and over the palpable lesion.  This accomplished a lumpectomy was completed in the upper outer aspect of the right breast incising down to the pectoralis major muscle medially and inferiorly and removing the breast tissue from the pectoralis major and the lateral aspect of the axilla.  With this accomplished attention was then turned toward the nasal dissection was completed superiorly the sentinel lymph nodes were noted and the sentinel lymph nodes were included in the resection with the sentinel lymph nodes in communication with the previous dissection and the sentinel lymph nodes were marked with silk suture to note the increased intensity of the 2 areas.  The medial aspect of the breast was marked as well as the inferior aspect to orient the pathologist and with the lesion marked the sentinel lymph nodes removed no further activity in the axilla the wound was irrigated with en bloc irrigation a 10 mm flat Varun-Moya drain was placed the deep dermis was reapproximated using simple inverted interrupted sutures of 3-0 Vicryl and the skin was enclosed using running subcuticular suture of 4-0 Vicryl.  Following this Mastisol, Steri-Strips, 4 x 4 and Tegaderm applied the patient was then extubated and transferred to the recovery room in good condition.       Date: 9/10/2018  Time: 12:53 PM    EMR Dragon/Transcription disclaimer: Much of this encounter note is an electronic  transcription/translation of spoken language to printed text. The electronic translation of spoken language may permit erroneous, or at times, nonsensical words or phrases to be inadvertently transcribed; although I have reviewed the note for such errors, some may still exist.

## 2018-09-10 NOTE — ANESTHESIA PROCEDURE NOTES
Airway  Airway not difficult    General Information and Staff    Patient location during procedure: OR  CRNA: SLIM CALLEJAS    Indications and Patient Condition  Indications for airway management: airway protection    Preoxygenated: yes  Mask difficulty assessment: 1 - vent by mask    Final Airway Details  Final airway type: supraglottic airway      Successful airway: classic  Size 3    Number of attempts at approach: 1

## 2018-09-10 NOTE — ANESTHESIA PREPROCEDURE EVALUATION
Anesthesia Evaluation     Patient summary reviewed and Nursing notes reviewed   history of anesthetic complications: PONV  NPO Solid Status: > 8 hours  NPO Liquid Status: > 8 hours           Airway   Mallampati: I  TM distance: >3 FB  Neck ROM: full  No difficulty expected  Dental      Pulmonary     breath sounds clear to auscultation  (-) asthma, sleep apnea  Cardiovascular   Exercise tolerance: good (4-7 METS)    ECG reviewed  Rhythm: regular  Rate: normal    (-) hypertension, CAD      Neuro/Psych  (-) seizures, TIA, CVA  GI/Hepatic/Renal/Endo    (+)   hepatitis,   (-) liver disease, no renal disease, diabetes    Musculoskeletal     Abdominal    Substance History      OB/GYN          Other      history of cancer (breast cancer) active                    Anesthesia Plan    ASA 2     general     intravenous induction   Anesthetic plan, all risks, benefits, and alternatives have been provided, discussed and informed consent has been obtained with: patient.

## 2018-09-18 ENCOUNTER — TRANSCRIBE ORDERS (OUTPATIENT)
Dept: ADMINISTRATIVE | Facility: HOSPITAL | Age: 63
End: 2018-09-18

## 2018-09-18 ENCOUNTER — DOCUMENTATION (OUTPATIENT)
Dept: RADIATION ONCOLOGY | Facility: HOSPITAL | Age: 63
End: 2018-09-18

## 2018-09-18 DIAGNOSIS — C50.411 MALIGNANT NEOPLASM OF UPPER-OUTER QUADRANT OF RIGHT FEMALE BREAST, UNSPECIFIED ESTROGEN RECEPTOR STATUS (HCC): Primary | ICD-10-CM

## 2018-09-18 NOTE — PROGRESS NOTES
Dr. Linton called about this patient today.  We have an appointment to see her later this week to advise us of his recommendations.  She had a 9 mm breast cancer which is invasive ductal carcinoma with 1 of 2 sentinel lymph nodes positive for metastatic disease.    Therefore she will be treated with adjuvant chemotherapy prior to any radiation.  Due to the positive lymph node I will anticipate treating the breast and regional lymph nodes over 6 weeks.  However we will defer radiation until completion of chemotherapy.

## 2018-09-19 PROBLEM — Z71.9 ENCOUNTER FOR CONSULTATION: Status: ACTIVE | Noted: 2018-09-19

## 2018-09-19 PROBLEM — Z98.890 S/P LYMPH NODE BIOPSY: Status: ACTIVE | Noted: 2018-09-19

## 2018-09-19 PROBLEM — Z98.890 S/P LUMPECTOMY, RIGHT BREAST: Status: ACTIVE | Noted: 2018-09-19

## 2018-09-19 PROBLEM — Z78.9 NON-SMOKER: Status: ACTIVE | Noted: 2018-09-19

## 2018-09-20 ENCOUNTER — CONSULT (OUTPATIENT)
Dept: RADIATION ONCOLOGY | Facility: HOSPITAL | Age: 63
End: 2018-09-20

## 2018-09-20 VITALS
WEIGHT: 142 LBS | BODY MASS INDEX: 22.29 KG/M2 | SYSTOLIC BLOOD PRESSURE: 126 MMHG | HEIGHT: 67 IN | DIASTOLIC BLOOD PRESSURE: 78 MMHG

## 2018-09-20 DIAGNOSIS — Z98.890 S/P LYMPH NODE BIOPSY: ICD-10-CM

## 2018-09-20 DIAGNOSIS — C50.411 MALIGNANT NEOPLASM OF UPPER-OUTER QUADRANT OF RIGHT BREAST IN FEMALE, ESTROGEN RECEPTOR POSITIVE (HCC): Primary | ICD-10-CM

## 2018-09-20 DIAGNOSIS — Z17.0 MALIGNANT NEOPLASM OF UPPER-OUTER QUADRANT OF RIGHT BREAST IN FEMALE, ESTROGEN RECEPTOR POSITIVE (HCC): Primary | ICD-10-CM

## 2018-09-20 DIAGNOSIS — Z98.890 S/P LUMPECTOMY, RIGHT BREAST: ICD-10-CM

## 2018-09-20 DIAGNOSIS — Z71.9 ENCOUNTER FOR CONSULTATION: ICD-10-CM

## 2018-09-20 DIAGNOSIS — Z78.9 NON-SMOKER: ICD-10-CM

## 2018-09-20 PROCEDURE — G0463 HOSPITAL OUTPT CLINIC VISIT: HCPCS | Performed by: RADIOLOGY

## 2018-09-21 ENCOUNTER — TRANSCRIBE ORDERS (OUTPATIENT)
Dept: ADMINISTRATIVE | Facility: HOSPITAL | Age: 63
End: 2018-09-21

## 2018-09-21 ENCOUNTER — HOSPITAL ENCOUNTER (OUTPATIENT)
Dept: CT IMAGING | Facility: HOSPITAL | Age: 63
Discharge: HOME | End: 2018-09-21
Attending: INTERNAL MEDICINE

## 2018-09-21 ENCOUNTER — HOSPITAL ENCOUNTER (OUTPATIENT)
Dept: NUCLEAR MEDICINE | Facility: HOSPITAL | Age: 63
Discharge: HOME | End: 2018-09-21
Attending: INTERNAL MEDICINE

## 2018-09-21 DIAGNOSIS — C50.411 MALIGNANT NEOPLASM OF UPPER-OUTER QUADRANT OF RIGHT FEMALE BREAST, UNSPECIFIED ESTROGEN RECEPTOR STATUS (HCC): ICD-10-CM

## 2018-09-21 DIAGNOSIS — K76.89 HEPATIC CYST: Primary | ICD-10-CM

## 2018-09-21 LAB — CREAT BLDA-MCNC: 0.7 MG/DL (ref 0.6–1.3)

## 2018-09-21 PROCEDURE — 82565 ASSAY OF CREATININE: CPT

## 2018-09-21 PROCEDURE — 78306 BONE IMAGING WHOLE BODY: CPT

## 2018-09-21 PROCEDURE — 71260 CT THORAX DX C+: CPT

## 2018-09-21 PROCEDURE — 74177 CT ABD & PELVIS W/CONTRAST: CPT

## 2018-09-21 PROCEDURE — 25010000002 IOPAMIDOL 61 % SOLUTION: Performed by: INTERNAL MEDICINE

## 2018-09-21 PROCEDURE — 0 TECHNETIUM OXIDRONATE KIT: Performed by: INTERNAL MEDICINE

## 2018-09-21 PROCEDURE — A9561 TC99M OXIDRONATE: HCPCS | Performed by: INTERNAL MEDICINE

## 2018-09-21 RX ADMIN — IOPAMIDOL 100 ML: 612 INJECTION, SOLUTION INTRAVENOUS at 11:08

## 2018-09-21 RX ADMIN — TECHNETIUM TC 99M OXIDRONATE 1 DOSE: 3.15 INJECTION, POWDER, LYOPHILIZED, FOR SOLUTION INTRAVENOUS at 11:34

## 2018-09-25 LAB
CYTO UR: NORMAL
LAB AP CASE REPORT: NORMAL
LAB AP CLINICAL INFORMATION: NORMAL
LAB AP INTRADEPARTMENTAL CONSULT: NORMAL
LAB AP SYNOPTIC CHECKLIST: NORMAL
PATH REPORT.FINAL DX SPEC: NORMAL
PATH REPORT.GROSS SPEC: NORMAL

## 2018-09-26 ENCOUNTER — HOSPITAL ENCOUNTER (OUTPATIENT)
Dept: ULTRASOUND IMAGING | Facility: HOSPITAL | Age: 63
Discharge: HOME | End: 2018-09-26
Attending: INTERNAL MEDICINE | Admitting: INTERNAL MEDICINE

## 2018-09-26 DIAGNOSIS — K76.89 HEPATIC CYST: ICD-10-CM

## 2018-09-26 PROCEDURE — 76705 ECHO EXAM OF ABDOMEN: CPT

## 2018-12-28 PROBLEM — Z71.89 ENCOUNTER TO DISCUSS PROCEDURE: Status: ACTIVE | Noted: 2018-12-28

## 2018-12-31 ENCOUNTER — HOSPITAL ENCOUNTER (OUTPATIENT)
Dept: RADIATION ONCOLOGY | Facility: HOSPITAL | Age: 63
Setting detail: RADIATION/ONCOLOGY SERIES
End: 2018-12-31

## 2018-12-31 ENCOUNTER — OFFICE VISIT (OUTPATIENT)
Dept: RADIATION ONCOLOGY | Facility: HOSPITAL | Age: 63
End: 2018-12-31

## 2018-12-31 ENCOUNTER — HOSPITAL ENCOUNTER (OUTPATIENT)
Dept: RADIATION ONCOLOGY | Facility: HOSPITAL | Age: 63
Discharge: HOME | End: 2018-12-31

## 2018-12-31 VITALS
HEIGHT: 66 IN | DIASTOLIC BLOOD PRESSURE: 72 MMHG | WEIGHT: 146 LBS | BODY MASS INDEX: 23.46 KG/M2 | SYSTOLIC BLOOD PRESSURE: 120 MMHG

## 2018-12-31 DIAGNOSIS — C50.411 MALIGNANT NEOPLASM OF UPPER-OUTER QUADRANT OF RIGHT BREAST IN FEMALE, ESTROGEN RECEPTOR POSITIVE (HCC): Primary | ICD-10-CM

## 2018-12-31 DIAGNOSIS — Z71.89 ENCOUNTER TO DISCUSS PROCEDURE: ICD-10-CM

## 2018-12-31 DIAGNOSIS — Z17.0 MALIGNANT NEOPLASM OF UPPER-OUTER QUADRANT OF RIGHT BREAST IN FEMALE, ESTROGEN RECEPTOR POSITIVE (HCC): Primary | ICD-10-CM

## 2018-12-31 DIAGNOSIS — Z78.9 NON-SMOKER: ICD-10-CM

## 2018-12-31 DIAGNOSIS — Z98.890 S/P LYMPH NODE BIOPSY: ICD-10-CM

## 2018-12-31 DIAGNOSIS — Z98.890 S/P LUMPECTOMY, RIGHT BREAST: ICD-10-CM

## 2018-12-31 PROCEDURE — 77290 THER RAD SIMULAJ FIELD CPLX: CPT | Performed by: RADIOLOGY

## 2018-12-31 PROCEDURE — 77334 RADIATION TREATMENT AID(S): CPT | Performed by: RADIOLOGY

## 2018-12-31 RX ORDER — MULTIPLE VITAMINS W/ MINERALS TAB 9MG-400MCG
1 TAB ORAL DAILY
COMMUNITY

## 2019-01-02 ENCOUNTER — HOSPITAL ENCOUNTER (OUTPATIENT)
Dept: RADIATION ONCOLOGY | Facility: HOSPITAL | Age: 64
Setting detail: RADIATION/ONCOLOGY SERIES
End: 2019-01-02

## 2019-01-08 ENCOUNTER — LAB REQUISITION (OUTPATIENT)
Dept: LAB | Facility: HOSPITAL | Age: 64
End: 2019-01-08

## 2019-01-08 DIAGNOSIS — Z00.00 ENCOUNTER FOR GENERAL ADULT MEDICAL EXAMINATION WITHOUT ABNORMAL FINDINGS: ICD-10-CM

## 2019-01-08 LAB — CEA SERPL-MCNC: 1.12 NG/ML (ref 0–5)

## 2019-01-08 PROCEDURE — 77295 3-D RADIOTHERAPY PLAN: CPT | Performed by: RADIOLOGY

## 2019-01-08 PROCEDURE — 77300 RADIATION THERAPY DOSE PLAN: CPT | Performed by: RADIOLOGY

## 2019-01-08 PROCEDURE — 82378 CARCINOEMBRYONIC ANTIGEN: CPT | Performed by: INTERNAL MEDICINE

## 2019-01-08 PROCEDURE — 77334 RADIATION TREATMENT AID(S): CPT | Performed by: RADIOLOGY

## 2019-01-15 ENCOUNTER — HOSPITAL ENCOUNTER (OUTPATIENT)
Dept: RADIATION ONCOLOGY | Facility: HOSPITAL | Age: 64
Discharge: HOME | End: 2019-01-15

## 2019-01-15 PROCEDURE — 77412 RADIATION TX DELIVERY LVL 3: CPT | Performed by: RADIOLOGY

## 2019-01-15 PROCEDURE — 77280 THER RAD SIMULAJ FIELD SMPL: CPT | Performed by: RADIOLOGY

## 2019-01-16 ENCOUNTER — HOSPITAL ENCOUNTER (OUTPATIENT)
Dept: RADIATION ONCOLOGY | Facility: HOSPITAL | Age: 64
Discharge: HOME | End: 2019-01-16

## 2019-01-16 PROCEDURE — 77412 RADIATION TX DELIVERY LVL 3: CPT | Performed by: RADIOLOGY

## 2019-01-16 PROCEDURE — 77417 THER RADIOLOGY PORT IMAGE(S): CPT | Performed by: RADIOLOGY

## 2019-01-17 ENCOUNTER — HOSPITAL ENCOUNTER (OUTPATIENT)
Dept: RADIATION ONCOLOGY | Facility: HOSPITAL | Age: 64
Discharge: HOME | End: 2019-01-17

## 2019-01-17 PROCEDURE — 77412 RADIATION TX DELIVERY LVL 3: CPT | Performed by: RADIOLOGY

## 2019-01-17 PROCEDURE — 77336 RADIATION PHYSICS CONSULT: CPT | Performed by: RADIOLOGY

## 2019-01-18 ENCOUNTER — HOSPITAL ENCOUNTER (OUTPATIENT)
Dept: RADIATION ONCOLOGY | Facility: HOSPITAL | Age: 64
Discharge: HOME | End: 2019-01-18

## 2019-01-18 PROCEDURE — 77412 RADIATION TX DELIVERY LVL 3: CPT | Performed by: RADIOLOGY

## 2019-01-21 ENCOUNTER — HOSPITAL ENCOUNTER (OUTPATIENT)
Dept: RADIATION ONCOLOGY | Facility: HOSPITAL | Age: 64
Discharge: HOME | End: 2019-01-21

## 2019-01-21 PROCEDURE — 77412 RADIATION TX DELIVERY LVL 3: CPT | Performed by: RADIOLOGY

## 2019-01-22 ENCOUNTER — HOSPITAL ENCOUNTER (OUTPATIENT)
Dept: RADIATION ONCOLOGY | Facility: HOSPITAL | Age: 64
Discharge: HOME | End: 2019-01-22

## 2019-01-22 PROCEDURE — 77412 RADIATION TX DELIVERY LVL 3: CPT | Performed by: RADIOLOGY

## 2019-01-23 ENCOUNTER — HOSPITAL ENCOUNTER (OUTPATIENT)
Dept: RADIATION ONCOLOGY | Facility: HOSPITAL | Age: 64
Discharge: HOME | End: 2019-01-23

## 2019-01-23 PROCEDURE — 77417 THER RADIOLOGY PORT IMAGE(S): CPT | Performed by: RADIOLOGY

## 2019-01-23 PROCEDURE — 77412 RADIATION TX DELIVERY LVL 3: CPT | Performed by: RADIOLOGY

## 2019-01-24 ENCOUNTER — HOSPITAL ENCOUNTER (OUTPATIENT)
Dept: RADIATION ONCOLOGY | Facility: HOSPITAL | Age: 64
Discharge: HOME | End: 2019-01-24

## 2019-01-24 PROCEDURE — 77336 RADIATION PHYSICS CONSULT: CPT | Performed by: RADIOLOGY

## 2019-01-24 PROCEDURE — 77412 RADIATION TX DELIVERY LVL 3: CPT | Performed by: RADIOLOGY

## 2019-01-25 ENCOUNTER — HOSPITAL ENCOUNTER (OUTPATIENT)
Dept: RADIATION ONCOLOGY | Facility: HOSPITAL | Age: 64
Discharge: HOME | End: 2019-01-25

## 2019-01-25 PROCEDURE — 77412 RADIATION TX DELIVERY LVL 3: CPT | Performed by: RADIOLOGY

## 2019-01-28 ENCOUNTER — HOSPITAL ENCOUNTER (OUTPATIENT)
Dept: RADIATION ONCOLOGY | Facility: HOSPITAL | Age: 64
Discharge: HOME | End: 2019-01-28

## 2019-01-28 PROCEDURE — 77412 RADIATION TX DELIVERY LVL 3: CPT | Performed by: RADIOLOGY

## 2019-01-29 ENCOUNTER — HOSPITAL ENCOUNTER (OUTPATIENT)
Dept: RADIATION ONCOLOGY | Facility: HOSPITAL | Age: 64
Discharge: HOME | End: 2019-01-29

## 2019-01-29 PROCEDURE — 77412 RADIATION TX DELIVERY LVL 3: CPT | Performed by: RADIOLOGY

## 2019-01-30 ENCOUNTER — HOSPITAL ENCOUNTER (OUTPATIENT)
Dept: RADIATION ONCOLOGY | Facility: HOSPITAL | Age: 64
Discharge: HOME | End: 2019-01-30

## 2019-01-30 PROCEDURE — 77412 RADIATION TX DELIVERY LVL 3: CPT | Performed by: RADIOLOGY

## 2019-01-31 ENCOUNTER — HOSPITAL ENCOUNTER (OUTPATIENT)
Dept: RADIATION ONCOLOGY | Facility: HOSPITAL | Age: 64
Discharge: HOME | End: 2019-01-31

## 2019-01-31 PROCEDURE — 77412 RADIATION TX DELIVERY LVL 3: CPT | Performed by: RADIOLOGY

## 2019-01-31 PROCEDURE — 77336 RADIATION PHYSICS CONSULT: CPT | Performed by: RADIOLOGY

## 2019-02-01 ENCOUNTER — HOSPITAL ENCOUNTER (OUTPATIENT)
Dept: RADIATION ONCOLOGY | Facility: HOSPITAL | Age: 64
Setting detail: RADIATION/ONCOLOGY SERIES
End: 2019-02-01

## 2019-02-01 ENCOUNTER — HOSPITAL ENCOUNTER (OUTPATIENT)
Dept: RADIATION ONCOLOGY | Facility: HOSPITAL | Age: 64
Discharge: HOME | End: 2019-02-01

## 2019-02-01 PROCEDURE — 77412 RADIATION TX DELIVERY LVL 3: CPT | Performed by: RADIOLOGY

## 2019-02-04 ENCOUNTER — HOSPITAL ENCOUNTER (OUTPATIENT)
Dept: RADIATION ONCOLOGY | Facility: HOSPITAL | Age: 64
Discharge: HOME | End: 2019-02-04

## 2019-02-04 PROCEDURE — 77412 RADIATION TX DELIVERY LVL 3: CPT | Performed by: RADIOLOGY

## 2019-02-05 ENCOUNTER — HOSPITAL ENCOUNTER (OUTPATIENT)
Dept: RADIATION ONCOLOGY | Facility: HOSPITAL | Age: 64
Discharge: HOME | End: 2019-02-05

## 2019-02-05 PROCEDURE — 77412 RADIATION TX DELIVERY LVL 3: CPT | Performed by: RADIOLOGY

## 2019-02-06 ENCOUNTER — HOSPITAL ENCOUNTER (OUTPATIENT)
Dept: RADIATION ONCOLOGY | Facility: HOSPITAL | Age: 64
Discharge: HOME | End: 2019-02-06

## 2019-02-06 PROCEDURE — 77412 RADIATION TX DELIVERY LVL 3: CPT | Performed by: RADIOLOGY

## 2019-02-06 PROCEDURE — 77417 THER RADIOLOGY PORT IMAGE(S): CPT | Performed by: RADIOLOGY

## 2019-02-07 ENCOUNTER — HOSPITAL ENCOUNTER (OUTPATIENT)
Dept: RADIATION ONCOLOGY | Facility: HOSPITAL | Age: 64
Discharge: HOME | End: 2019-02-07

## 2019-02-07 PROCEDURE — 77336 RADIATION PHYSICS CONSULT: CPT | Performed by: RADIOLOGY

## 2019-02-07 PROCEDURE — 77412 RADIATION TX DELIVERY LVL 3: CPT | Performed by: RADIOLOGY

## 2019-02-08 ENCOUNTER — HOSPITAL ENCOUNTER (OUTPATIENT)
Dept: RADIATION ONCOLOGY | Facility: HOSPITAL | Age: 64
Discharge: HOME | End: 2019-02-08

## 2019-02-08 PROCEDURE — 77412 RADIATION TX DELIVERY LVL 3: CPT | Performed by: RADIOLOGY

## 2019-02-11 ENCOUNTER — HOSPITAL ENCOUNTER (OUTPATIENT)
Dept: RADIATION ONCOLOGY | Facility: HOSPITAL | Age: 64
Discharge: HOME | End: 2019-02-11

## 2019-02-11 PROCEDURE — 77412 RADIATION TX DELIVERY LVL 3: CPT | Performed by: RADIOLOGY

## 2019-02-12 ENCOUNTER — HOSPITAL ENCOUNTER (OUTPATIENT)
Dept: RADIATION ONCOLOGY | Facility: HOSPITAL | Age: 64
Discharge: HOME | End: 2019-02-12

## 2019-02-12 PROCEDURE — 77334 RADIATION TREATMENT AID(S): CPT | Performed by: RADIOLOGY

## 2019-02-12 PROCEDURE — 77300 RADIATION THERAPY DOSE PLAN: CPT | Performed by: RADIOLOGY

## 2019-02-12 PROCEDURE — 77412 RADIATION TX DELIVERY LVL 3: CPT | Performed by: RADIOLOGY

## 2019-02-13 ENCOUNTER — HOSPITAL ENCOUNTER (OUTPATIENT)
Dept: RADIATION ONCOLOGY | Facility: HOSPITAL | Age: 64
Discharge: HOME | End: 2019-02-13

## 2019-02-13 PROCEDURE — 77412 RADIATION TX DELIVERY LVL 3: CPT | Performed by: RADIOLOGY

## 2019-02-13 PROCEDURE — 77417 THER RADIOLOGY PORT IMAGE(S): CPT | Performed by: RADIOLOGY

## 2019-02-14 ENCOUNTER — HOSPITAL ENCOUNTER (OUTPATIENT)
Dept: RADIATION ONCOLOGY | Facility: HOSPITAL | Age: 64
Discharge: HOME | End: 2019-02-14

## 2019-02-14 PROCEDURE — 77412 RADIATION TX DELIVERY LVL 3: CPT | Performed by: RADIOLOGY

## 2019-02-14 PROCEDURE — 77336 RADIATION PHYSICS CONSULT: CPT | Performed by: RADIOLOGY

## 2019-02-15 ENCOUNTER — HOSPITAL ENCOUNTER (OUTPATIENT)
Dept: RADIATION ONCOLOGY | Facility: HOSPITAL | Age: 64
Discharge: HOME | End: 2019-02-15

## 2019-02-15 PROCEDURE — 77412 RADIATION TX DELIVERY LVL 3: CPT | Performed by: RADIOLOGY

## 2019-02-18 ENCOUNTER — HOSPITAL ENCOUNTER (OUTPATIENT)
Dept: RADIATION ONCOLOGY | Facility: HOSPITAL | Age: 64
Discharge: HOME | End: 2019-02-18

## 2019-02-18 PROCEDURE — 77412 RADIATION TX DELIVERY LVL 3: CPT | Performed by: RADIOLOGY

## 2019-02-19 ENCOUNTER — HOSPITAL ENCOUNTER (OUTPATIENT)
Dept: RADIATION ONCOLOGY | Facility: HOSPITAL | Age: 64
Discharge: HOME | End: 2019-02-19

## 2019-02-19 PROCEDURE — 77412 RADIATION TX DELIVERY LVL 3: CPT | Performed by: RADIOLOGY

## 2019-02-20 ENCOUNTER — HOSPITAL ENCOUNTER (OUTPATIENT)
Dept: RADIATION ONCOLOGY | Facility: HOSPITAL | Age: 64
Discharge: HOME | End: 2019-02-20

## 2019-02-20 PROCEDURE — 77412 RADIATION TX DELIVERY LVL 3: CPT | Performed by: RADIOLOGY

## 2019-02-21 ENCOUNTER — HOSPITAL ENCOUNTER (OUTPATIENT)
Dept: RADIATION ONCOLOGY | Facility: HOSPITAL | Age: 64
Discharge: HOME | End: 2019-02-21

## 2019-02-21 PROCEDURE — 77417 THER RADIOLOGY PORT IMAGE(S): CPT | Performed by: RADIOLOGY

## 2019-02-21 PROCEDURE — 77412 RADIATION TX DELIVERY LVL 3: CPT | Performed by: RADIOLOGY

## 2019-02-21 PROCEDURE — 77336 RADIATION PHYSICS CONSULT: CPT | Performed by: RADIOLOGY

## 2019-02-22 ENCOUNTER — HOSPITAL ENCOUNTER (OUTPATIENT)
Dept: RADIATION ONCOLOGY | Facility: HOSPITAL | Age: 64
Discharge: HOME | End: 2019-02-22

## 2019-02-22 PROCEDURE — 77412 RADIATION TX DELIVERY LVL 3: CPT | Performed by: RADIOLOGY

## 2019-02-25 ENCOUNTER — HOSPITAL ENCOUNTER (OUTPATIENT)
Dept: RADIATION ONCOLOGY | Facility: HOSPITAL | Age: 64
Discharge: HOME | End: 2019-02-25

## 2019-02-25 PROCEDURE — 77412 RADIATION TX DELIVERY LVL 3: CPT | Performed by: RADIOLOGY

## 2019-03-07 ENCOUNTER — TRANSCRIBE ORDERS (OUTPATIENT)
Dept: ONCOLOGY | Facility: CLINIC | Age: 64
End: 2019-03-07

## 2019-03-07 DIAGNOSIS — C50.411 MALIGNANT NEOPLASM OF UPPER-OUTER QUADRANT OF RIGHT FEMALE BREAST, UNSPECIFIED ESTROGEN RECEPTOR STATUS (HCC): Primary | ICD-10-CM

## 2019-04-02 ENCOUNTER — APPOINTMENT (OUTPATIENT)
Dept: LAB | Facility: HOSPITAL | Age: 64
End: 2019-04-02

## 2019-04-02 LAB
ALBUMIN SERPL-MCNC: 4.2 G/DL (ref 3.5–5)
ALBUMIN/GLOB SERPL: 1.4 G/DL (ref 1.1–2.5)
ALP SERPL-CCNC: 74 U/L (ref 24–120)
ALT SERPL W P-5'-P-CCNC: <15 U/L (ref 0–54)
ANION GAP SERPL CALCULATED.3IONS-SCNC: 9 MMOL/L (ref 4–13)
AST SERPL-CCNC: 24 U/L (ref 7–45)
BASOPHILS # BLD AUTO: 0.03 10*3/MM3 (ref 0–0.2)
BASOPHILS NFR BLD AUTO: 0.8 % (ref 0–2)
BILIRUB SERPL-MCNC: 0.7 MG/DL (ref 0.1–1)
BUN BLD-MCNC: 21 MG/DL (ref 5–21)
BUN/CREAT SERPL: 33.9 (ref 7–25)
CALCIUM SPEC-SCNC: 9.8 MG/DL (ref 8.4–10.4)
CEA SERPL-MCNC: 1.83 NG/ML (ref 0–5)
CHLORIDE SERPL-SCNC: 101 MMOL/L (ref 98–110)
CO2 SERPL-SCNC: 32 MMOL/L (ref 24–31)
CREAT BLD-MCNC: 0.62 MG/DL (ref 0.5–1.4)
DEPRECATED RDW RBC AUTO: 38.2 FL (ref 40–54)
EOSINOPHIL # BLD AUTO: 0.13 10*3/MM3 (ref 0–0.7)
EOSINOPHIL NFR BLD AUTO: 3.3 % (ref 0–4)
ERYTHROCYTE [DISTWIDTH] IN BLOOD BY AUTOMATED COUNT: 12.5 % (ref 12–15)
GFR SERPL CREATININE-BSD FRML MDRD: 97 ML/MIN/1.73
GLOBULIN UR ELPH-MCNC: 3.1 GM/DL
GLUCOSE BLD-MCNC: 96 MG/DL (ref 70–100)
HCT VFR BLD AUTO: 42.9 % (ref 37–47)
HGB BLD-MCNC: 14.1 G/DL (ref 12–16)
IMM GRANULOCYTES # BLD AUTO: 0.01 10*3/MM3 (ref 0–0.05)
IMM GRANULOCYTES NFR BLD AUTO: 0.3 % (ref 0–5)
LYMPHOCYTES # BLD AUTO: 0.62 10*3/MM3 (ref 0.72–4.86)
LYMPHOCYTES NFR BLD AUTO: 15.9 % (ref 15–45)
MCH RBC QN AUTO: 27.3 PG (ref 28–32)
MCHC RBC AUTO-ENTMCNC: 32.9 G/DL (ref 33–36)
MCV RBC AUTO: 83 FL (ref 82–98)
MONOCYTES # BLD AUTO: 0.48 10*3/MM3 (ref 0.19–1.3)
MONOCYTES NFR BLD AUTO: 12.3 % (ref 4–12)
NEUTROPHILS # BLD AUTO: 2.63 10*3/MM3 (ref 1.87–8.4)
NEUTROPHILS NFR BLD AUTO: 67.4 % (ref 39–78)
NRBC BLD AUTO-RTO: 0 /100 WBC (ref 0–0)
PLATELET # BLD AUTO: 208 10*3/MM3 (ref 130–400)
PMV BLD AUTO: 9.5 FL (ref 6–12)
POTASSIUM BLD-SCNC: 4.6 MMOL/L (ref 3.5–5.3)
PROT SERPL-MCNC: 7.3 G/DL (ref 6.3–8.7)
RBC # BLD AUTO: 5.17 10*6/MM3 (ref 4.2–5.4)
SODIUM BLD-SCNC: 142 MMOL/L (ref 135–145)
WBC NRBC COR # BLD: 3.9 10*3/MM3 (ref 4.8–10.8)

## 2019-04-02 PROCEDURE — 85025 COMPLETE CBC W/AUTO DIFF WBC: CPT | Performed by: INTERNAL MEDICINE

## 2019-04-02 PROCEDURE — 80053 COMPREHEN METABOLIC PANEL: CPT | Performed by: INTERNAL MEDICINE

## 2019-04-02 PROCEDURE — 36415 COLL VENOUS BLD VENIPUNCTURE: CPT | Performed by: INTERNAL MEDICINE

## 2019-04-02 PROCEDURE — 86300 IMMUNOASSAY TUMOR CA 15-3: CPT | Performed by: INTERNAL MEDICINE

## 2019-04-02 PROCEDURE — 82378 CARCINOEMBRYONIC ANTIGEN: CPT | Performed by: INTERNAL MEDICINE

## 2019-04-03 LAB — CANCER AG27-29 SERPL-ACNC: 13.1 U/ML (ref 0–38.6)

## 2019-04-10 ENCOUNTER — HOSPITAL ENCOUNTER (OUTPATIENT)
Dept: RADIATION ONCOLOGY | Facility: HOSPITAL | Age: 64
Setting detail: RADIATION/ONCOLOGY SERIES
End: 2019-04-10

## 2019-04-10 ENCOUNTER — TRANSCRIBE ORDERS (OUTPATIENT)
Dept: ONCOLOGY | Facility: CLINIC | Age: 64
End: 2019-04-10

## 2019-04-10 DIAGNOSIS — C50.411 MALIGNANT NEOPLASM OF UPPER-OUTER QUADRANT OF RIGHT FEMALE BREAST, UNSPECIFIED ESTROGEN RECEPTOR STATUS (HCC): Primary | ICD-10-CM

## 2019-04-10 PROBLEM — Z92.3 HISTORY OF RADIATION THERAPY: Status: ACTIVE | Noted: 2019-04-10

## 2019-04-11 ENCOUNTER — OFFICE VISIT (OUTPATIENT)
Dept: RADIATION ONCOLOGY | Facility: HOSPITAL | Age: 64
End: 2019-04-11

## 2019-04-11 ENCOUNTER — TRANSCRIBE ORDERS (OUTPATIENT)
Dept: ADMINISTRATIVE | Facility: HOSPITAL | Age: 64
End: 2019-04-11

## 2019-04-11 VITALS
BODY MASS INDEX: 22.82 KG/M2 | WEIGHT: 142 LBS | HEIGHT: 66 IN | SYSTOLIC BLOOD PRESSURE: 108 MMHG | DIASTOLIC BLOOD PRESSURE: 76 MMHG

## 2019-04-11 DIAGNOSIS — Z78.9 NON-SMOKER: ICD-10-CM

## 2019-04-11 DIAGNOSIS — Z78.0 POSTMENOPAUSAL: Primary | ICD-10-CM

## 2019-04-11 DIAGNOSIS — C50.411 MALIGNANT NEOPLASM OF UPPER-OUTER QUADRANT OF RIGHT BREAST IN FEMALE, ESTROGEN RECEPTOR POSITIVE (HCC): Primary | ICD-10-CM

## 2019-04-11 DIAGNOSIS — Z98.890 S/P LUMPECTOMY, RIGHT BREAST: ICD-10-CM

## 2019-04-11 DIAGNOSIS — Z17.0 MALIGNANT NEOPLASM OF UPPER-OUTER QUADRANT OF RIGHT BREAST IN FEMALE, ESTROGEN RECEPTOR POSITIVE (HCC): Primary | ICD-10-CM

## 2019-04-11 DIAGNOSIS — Z92.3 HISTORY OF RADIATION THERAPY: ICD-10-CM

## 2019-04-11 DIAGNOSIS — Z79.811 PROPHYLACTIC USE OF LETROZOLE (FEMARA): ICD-10-CM

## 2019-04-11 DIAGNOSIS — Z98.890 S/P LYMPH NODE BIOPSY: ICD-10-CM

## 2019-04-11 PROCEDURE — G0463 HOSPITAL OUTPT CLINIC VISIT: HCPCS | Performed by: RADIOLOGY

## 2019-04-17 ENCOUNTER — HOSPITAL ENCOUNTER (OUTPATIENT)
Dept: BONE DENSITY | Facility: HOSPITAL | Age: 64
Discharge: HOME | End: 2019-04-17
Admitting: INTERNAL MEDICINE

## 2019-04-17 DIAGNOSIS — Z78.0 POSTMENOPAUSAL: ICD-10-CM

## 2019-04-17 PROCEDURE — 77080 DXA BONE DENSITY AXIAL: CPT

## 2019-04-23 DIAGNOSIS — C50.411 MALIGNANT NEOPLASM OF UPPER-OUTER QUADRANT OF RIGHT BREAST IN FEMALE, ESTROGEN RECEPTOR POSITIVE (HCC): Primary | ICD-10-CM

## 2019-04-23 DIAGNOSIS — Z17.0 MALIGNANT NEOPLASM OF UPPER-OUTER QUADRANT OF RIGHT BREAST IN FEMALE, ESTROGEN RECEPTOR POSITIVE (HCC): Primary | ICD-10-CM

## 2019-04-26 ENCOUNTER — HOSPITAL ENCOUNTER (OUTPATIENT)
Dept: MAMMOGRAPHY | Facility: HOSPITAL | Age: 64
Discharge: HOME | End: 2019-04-26
Admitting: RADIOLOGY

## 2019-04-26 ENCOUNTER — HOSPITAL ENCOUNTER (OUTPATIENT)
Dept: ULTRASOUND IMAGING | Facility: HOSPITAL | Age: 64
Discharge: HOME | End: 2019-04-26

## 2019-04-26 DIAGNOSIS — Z17.0 MALIGNANT NEOPLASM OF UPPER-OUTER QUADRANT OF RIGHT BREAST IN FEMALE, ESTROGEN RECEPTOR POSITIVE (HCC): ICD-10-CM

## 2019-04-26 DIAGNOSIS — Z98.890 S/P LUMPECTOMY, RIGHT BREAST: ICD-10-CM

## 2019-04-26 DIAGNOSIS — Z78.9 NON-SMOKER: ICD-10-CM

## 2019-04-26 DIAGNOSIS — Z98.890 S/P LYMPH NODE BIOPSY: ICD-10-CM

## 2019-04-26 DIAGNOSIS — C50.411 MALIGNANT NEOPLASM OF UPPER-OUTER QUADRANT OF RIGHT BREAST IN FEMALE, ESTROGEN RECEPTOR POSITIVE (HCC): ICD-10-CM

## 2019-04-26 DIAGNOSIS — Z92.3 HISTORY OF RADIATION THERAPY: ICD-10-CM

## 2019-04-26 PROCEDURE — 77065 DX MAMMO INCL CAD UNI: CPT

## 2019-04-26 PROCEDURE — G0279 TOMOSYNTHESIS, MAMMO: HCPCS

## 2019-04-26 RX ORDER — PROCHLORPERAZINE MALEATE 10 MG
10 TABLET ORAL EVERY 6 HOURS PRN
COMMUNITY
End: 2019-05-13

## 2019-05-13 ENCOUNTER — OFFICE VISIT (OUTPATIENT)
Dept: GASTROENTEROLOGY | Facility: CLINIC | Age: 64
End: 2019-05-13

## 2019-05-13 VITALS
BODY MASS INDEX: 23.14 KG/M2 | HEART RATE: 80 BPM | WEIGHT: 144 LBS | TEMPERATURE: 96.7 F | OXYGEN SATURATION: 98 % | DIASTOLIC BLOOD PRESSURE: 68 MMHG | HEIGHT: 66 IN | SYSTOLIC BLOOD PRESSURE: 126 MMHG

## 2019-05-13 DIAGNOSIS — Z12.11 ENCOUNTER FOR SCREENING FOR MALIGNANT NEOPLASM OF COLON: Primary | ICD-10-CM

## 2019-05-13 PROCEDURE — S0285 CNSLT BEFORE SCREEN COLONOSC: HCPCS | Performed by: NURSE PRACTITIONER

## 2019-05-13 NOTE — PROGRESS NOTES
[facility]enterology    Primary Physician Arie Renteria MD     Referring Physician: Dr. Linton     5/13/2019    Adriana Samuels   1955      Chief Complaint   Patient presents with   • Colonoscopy       Subjective     HPI    Adriana Samuels is a 63 y.o. female who presents as a referral for preventative maintenance. She has no complaints of nausea or vomiting. No change in bowels. No wt loss. No BRBPR. No melena. No abdominal pain.       She has never had a colonoscopy.  The patient does not have history of colon polyps. The patient does not have history of colon cancer.   There is no family history of colon polyps. There is no family history of colon cancer.     Past Medical History:   Diagnosis Date   • Breast cancer (CMS/HCC) 09/2018    right breast   • Cancer (CMS/HCC)    • Drug therapy 09/2018-11/2018   • Herpes zoster    • Hx of radiation therapy 02/2019    right breast   • Menorrhagia    • Mononucleosis    • PONV (postoperative nausea and vomiting)    • Shingles        Past Surgical History:   Procedure Laterality Date   • BREAST BIOPSY Right 09/2018    positive   • BREAST LUMPECTOMY Right 09/2018    lumpectomy with chemo and rad tx   • BREAST LUMPECTOMY WITH SENTINEL NODE BIOPSY Right 9/10/2018    Procedure: RIGHT BREAST LUMPECTOMY WITH SENTINEL LYMPH NODE BIOPSY, INJECTION AND SCAN;  Surgeon: Duglas Lawrence MD;  Location: [facility] OR;  Service: General   • HERNIA REPAIR     • HYDROCELE EXCISION / REPAIR     • LASER ABLATION OF THE CERVIX     • WRIST FUSION         Outpatient Medications Marked as Taking for the 5/13/19 encounter (Office Visit) with Jacy Garza APRN   Medication Sig Dispense Refill   • aspirin 81 MG chewable tablet Chew 81 mg Daily.     • Biotin 1000 MCG chewable tablet Chew.     • Calcium Carb-Cholecalciferol (CALCIUM 1000 + D PO) Take  by mouth.     • glucosamine sulfate 500 MG capsule capsule Take  by mouth 3 (Three) Times a Day With Meals.     • Loratadine  (CLARITIN PO) Take  by mouth.     • Multiple Vitamins-Minerals (MULTIVITAMIN WITH MINERALS) tablet tablet Take 1 tablet by mouth Daily.     • Probiotic Product (PROBIOTIC ADVANCED PO) Take  by mouth.     • Probiotic Product (PROBIOTIC PO) Take  by mouth.         Allergies   Allergen Reactions   • Bactrim [Sulfamethoxazole-Trimethoprim] Rash   • Cortisone Rash   • Decadron [Dexamethasone] Hives   • Hydrocortisone Hives     Oral or IV   • Erythromycin Nausea Only and Rash       Social History     Socioeconomic History   • Marital status:      Spouse name: Not on file   • Number of children: Not on file   • Years of education: Not on file   • Highest education level: Not on file   Tobacco Use   • Smoking status: Never Smoker   • Smokeless tobacco: Never Used   Substance and Sexual Activity   • Alcohol use: Yes     Comment: RARELY   • Drug use: No   • Sexual activity: Defer       Family History   Problem Relation Age of Onset   • Skin cancer Father    • Breast cancer Neg Hx    • Colon cancer Neg Hx    • Colon polyps Neg Hx        Review of Systems   Constitutional: Negative for appetite change, chills, fatigue, fever and unexpected weight change.   HENT: Negative for sore throat and trouble swallowing.    Eyes: Negative for visual disturbance.   Respiratory: Negative for cough, chest tightness, shortness of breath and wheezing.    Cardiovascular: Negative for chest pain and palpitations.   Gastrointestinal: Negative for abdominal distention, abdominal pain, anal bleeding, blood in stool, constipation, diarrhea, nausea, rectal pain and vomiting.        As mentioned in hpi   Genitourinary: Negative for difficulty urinating and hematuria.   Musculoskeletal: Negative for arthralgias and back pain.   Skin: Negative for color change and rash.   Neurological: Negative for dizziness, seizures, syncope, light-headedness and headaches.   Hematological: Negative for adenopathy.   Psychiatric/Behavioral: Negative for  confusion. The patient is not nervous/anxious.        Objective     Vitals:    05/13/19 1441   BP: 126/68   Pulse: 80   Temp: 96.7 °F (35.9 °C)   SpO2: 98%         05/13/19  1441   Weight: 65.3 kg (144 lb)     Body mass index is 23.24 kg/m².    Physical Exam   Constitutional: She appears well-developed and well-nourished. No distress.   HENT:   Head: Normocephalic and atraumatic.   Eyes: EOM are normal. No scleral icterus.   Neck: Neck supple. No JVD present.   Cardiovascular: Normal rate, regular rhythm and normal heart sounds.   Pulmonary/Chest: Effort normal and breath sounds normal.   Abdominal: Soft. Bowel sounds are normal. She exhibits no distension. There is no tenderness.   Musculoskeletal: Normal range of motion. She exhibits no deformity.   Neurological: She is alert.   Skin: Skin is warm and dry. No rash noted.   Psychiatric: She has a normal mood and affect. Her behavior is normal.   Vitals reviewed.      Imaging Results (most recent)     None          Assessment/Plan     Adriana was seen today for colonoscopy.    Diagnoses and all orders for this visit:    Encounter for screening for malignant neoplasm of colon  -     Case Request; Standing  -     Case Request    Other orders  -     Follow Anesthesia Guidelines / Standing Orders; Future  -     Implement Anesthesia Orders Day of Procedure; Standing  -     Obtain Informed Consent; Standing  -     Obtain Informed Consent; Future  -     polyethylene glycol (GOLYTELY) 236 g solution; Take 4,000 mL by mouth 1 (One) Time for 1 dose. Take as directed per instruction sheet.     Plan for colonoscopy.          Body mass index is 23.24 kg/m².    Patient's Body mass index is 23.24 kg/m². BMI is within normal parameters. No follow-up required..      COLONOSCOPY WITH ANESTHESIA (N/A)  All risks, benefits, alternatives, and indications of colonoscopy procedure have been discussed with the patient. Risks to include perforation of the colon requiring possible surgery or  colostomy, risk of bleeding from biopsies or removal of colon tissue, possibility of missing a colon polyp or cancer, or adverse drug reaction.  Benefits to include the diagnosis and management of disease of the colon and rectum. Alternatives to include barium enema, radiographic evaluation, lab testing or no intervention. Pt verbalizes understanding and agrees.         TRISTA Roman      EMR Dragon/transcription disclaimer:  Much of this encounter note is electronic transcription/translation of spoken language to printed text.  The electronic translation of spoken language may be erroneous, or at times, nonsensical words or phrases may be inadvertently transcribed.  Although I have reviewed the note for such errors, some may still exist.

## 2019-06-06 ENCOUNTER — TELEPHONE (OUTPATIENT)
Dept: GASTROENTEROLOGY | Facility: CLINIC | Age: 64
End: 2019-06-06

## 2019-06-06 ENCOUNTER — HOSPITAL ENCOUNTER (OUTPATIENT)
Facility: HOSPITAL | Age: 64
Setting detail: HOSPITAL OUTPATIENT SURGERY
Discharge: HOME | End: 2019-06-06
Attending: INTERNAL MEDICINE | Admitting: INTERNAL MEDICINE

## 2019-06-06 ENCOUNTER — ANESTHESIA EVENT (OUTPATIENT)
Dept: GASTROENTEROLOGY | Facility: HOSPITAL | Age: 64
End: 2019-06-06

## 2019-06-06 ENCOUNTER — ANESTHESIA (OUTPATIENT)
Dept: GASTROENTEROLOGY | Facility: HOSPITAL | Age: 64
End: 2019-06-06

## 2019-06-06 VITALS
BODY MASS INDEX: 22.5 KG/M2 | OXYGEN SATURATION: 97 % | HEART RATE: 82 BPM | DIASTOLIC BLOOD PRESSURE: 67 MMHG | HEIGHT: 66 IN | WEIGHT: 140 LBS | SYSTOLIC BLOOD PRESSURE: 114 MMHG | TEMPERATURE: 97.6 F | RESPIRATION RATE: 12 BRPM

## 2019-06-06 PROCEDURE — 25010000002 PROPOFOL 10 MG/ML EMULSION: Performed by: NURSE ANESTHETIST, CERTIFIED REGISTERED

## 2019-06-06 PROCEDURE — G0121 COLON CA SCRN NOT HI RSK IND: HCPCS | Performed by: INTERNAL MEDICINE

## 2019-06-06 RX ORDER — SODIUM CHLORIDE 9 MG/ML
500 INJECTION, SOLUTION INTRAVENOUS CONTINUOUS PRN
Status: DISCONTINUED | OUTPATIENT
Start: 2019-06-06 | End: 2019-06-06 | Stop reason: HOSPADM

## 2019-06-06 RX ORDER — LIDOCAINE HYDROCHLORIDE 20 MG/ML
INJECTION, SOLUTION INFILTRATION; PERINEURAL AS NEEDED
Status: DISCONTINUED | OUTPATIENT
Start: 2019-06-06 | End: 2019-06-06 | Stop reason: SURG

## 2019-06-06 RX ORDER — SODIUM CHLORIDE 0.9 % (FLUSH) 0.9 %
3 SYRINGE (ML) INJECTION AS NEEDED
Status: DISCONTINUED | OUTPATIENT
Start: 2019-06-06 | End: 2019-06-06 | Stop reason: HOSPADM

## 2019-06-06 RX ORDER — SODIUM CHLORIDE 0.9 % (FLUSH) 0.9 %
3 SYRINGE (ML) INJECTION EVERY 12 HOURS SCHEDULED
Status: CANCELLED | OUTPATIENT
Start: 2019-06-06

## 2019-06-06 RX ORDER — PROPOFOL 10 MG/ML
VIAL (ML) INTRAVENOUS AS NEEDED
Status: DISCONTINUED | OUTPATIENT
Start: 2019-06-06 | End: 2019-06-06 | Stop reason: SURG

## 2019-06-06 RX ORDER — SODIUM CHLORIDE 0.9 % (FLUSH) 0.9 %
3-10 SYRINGE (ML) INJECTION AS NEEDED
Status: CANCELLED | OUTPATIENT
Start: 2019-06-06

## 2019-06-06 RX ORDER — SODIUM CHLORIDE 9 MG/ML
100 INJECTION, SOLUTION INTRAVENOUS CONTINUOUS
Status: CANCELLED | OUTPATIENT
Start: 2019-06-06

## 2019-06-06 RX ORDER — ONDANSETRON 2 MG/ML
4 INJECTION INTRAMUSCULAR; INTRAVENOUS ONCE AS NEEDED
Status: DISCONTINUED | OUTPATIENT
Start: 2019-06-06 | End: 2019-06-06 | Stop reason: HOSPADM

## 2019-06-06 RX ADMIN — PROPOFOL 50 MG: 10 INJECTION, EMULSION INTRAVENOUS at 09:12

## 2019-06-06 RX ADMIN — LIDOCAINE HYDROCHLORIDE 100 MG: 20 INJECTION, SOLUTION INFILTRATION; PERINEURAL at 08:53

## 2019-06-06 RX ADMIN — PROPOFOL 100 MG: 10 INJECTION, EMULSION INTRAVENOUS at 08:53

## 2019-06-06 RX ADMIN — SODIUM CHLORIDE 500 ML: 9 INJECTION, SOLUTION INTRAVENOUS at 07:42

## 2019-06-06 RX ADMIN — PROPOFOL 50 MG: 10 INJECTION, EMULSION INTRAVENOUS at 09:04

## 2019-06-06 RX ADMIN — PROPOFOL 50 MG: 10 INJECTION, EMULSION INTRAVENOUS at 08:58

## 2019-06-06 RX ADMIN — PROPOFOL 50 MG: 10 INJECTION, EMULSION INTRAVENOUS at 09:08

## 2019-06-06 NOTE — ANESTHESIA PREPROCEDURE EVALUATION
Anesthesia Evaluation     history of anesthetic complications: PONV  NPO Solid Status: > 8 hours  NPO Liquid Status: > 4 hours           Airway   Neck ROM: full  No difficulty expected  Dental      Pulmonary - normal exam   Cardiovascular - normal exam        Neuro/Psych  GI/Hepatic/Renal/Endo      Musculoskeletal     Abdominal  - normal exam   Substance History      OB/GYN          Other      history of cancer remission                    Anesthesia Plan    ASA 2     MAC     intravenous induction   Anesthetic plan, all risks, benefits, and alternatives have been provided, discussed and informed consent has been obtained with: patient.

## 2019-06-06 NOTE — ANESTHESIA POSTPROCEDURE EVALUATION
Patient: Adriana Samuels    Procedure Summary     Date:  06/06/19 Room / Location:  [facility] ENDOSCOPY 2 / [facility]    Anesthesia Start:  0851 Anesthesia Stop:  0924    Procedure:  COLONOSCOPY WITH ANESTHESIA (N/A ) Diagnosis:       Encounter for screening for malignant neoplasm of colon      (Encounter for screening for malignant neoplasm of colon [Z12.11])    Surgeon:  Brock Raman MD Provider:  KAMILA Forbes CRNA    Anesthesia Type:  MAC ASA Status:  2          Anesthesia Type: MAC  Last vitals  BP   118/69 (06/06/19 0731)   Temp   97.6 °F (36.4 °C) (06/06/19 0731)   Pulse   82 (06/06/19 0731)   Resp   18 (06/06/19 0731)     SpO2   98 % (06/06/19 0731)     Post Anesthesia Care and Evaluation    Patient location during evaluation: PACU  Patient participation: complete - patient participated  Level of consciousness: awake and alert  Pain score: 0  Pain management: adequate  Airway patency: patent  Anesthetic complications: No anesthetic complications    Cardiovascular status: acceptable and stable  Respiratory status: acceptable and unassisted  Hydration status: acceptable

## 2019-07-30 ENCOUNTER — APPOINTMENT (OUTPATIENT)
Dept: LAB | Facility: HOSPITAL | Age: 64
End: 2019-07-30

## 2019-07-30 LAB
ALBUMIN SERPL-MCNC: 4.2 G/DL (ref 3.5–5)
ALBUMIN/GLOB SERPL: 1.4 G/DL (ref 1.1–2.5)
ALP SERPL-CCNC: 75 U/L (ref 24–120)
ALT SERPL W P-5'-P-CCNC: 18 U/L (ref 0–54)
ANION GAP SERPL CALCULATED.3IONS-SCNC: 6 MMOL/L (ref 4–13)
AST SERPL-CCNC: 26 U/L (ref 7–45)
BASOPHILS # BLD AUTO: 0.02 10*3/MM3 (ref 0–0.2)
BASOPHILS NFR BLD AUTO: 0.5 % (ref 0–2)
BILIRUB SERPL-MCNC: 0.7 MG/DL (ref 0.1–1)
BUN BLD-MCNC: 15 MG/DL (ref 5–21)
BUN/CREAT SERPL: 21.1 (ref 7–25)
CALCIUM SPEC-SCNC: 9.3 MG/DL (ref 8.4–10.4)
CEA SERPL-MCNC: 2.27 NG/ML (ref 0–5)
CHLORIDE SERPL-SCNC: 106 MMOL/L (ref 98–110)
CO2 SERPL-SCNC: 31 MMOL/L (ref 24–31)
CREAT BLD-MCNC: 0.71 MG/DL (ref 0.5–1.4)
DEPRECATED RDW RBC AUTO: 42.5 FL (ref 40–54)
EOSINOPHIL # BLD AUTO: 0.16 10*3/MM3 (ref 0–0.7)
EOSINOPHIL NFR BLD AUTO: 4.3 % (ref 0–4)
ERYTHROCYTE [DISTWIDTH] IN BLOOD BY AUTOMATED COUNT: 13.2 % (ref 12–15)
GFR SERPL CREATININE-BSD FRML MDRD: 83 ML/MIN/1.73
GLOBULIN UR ELPH-MCNC: 3 GM/DL
GLUCOSE BLD-MCNC: 68 MG/DL (ref 70–100)
HCT VFR BLD AUTO: 42.4 % (ref 37–47)
HGB BLD-MCNC: 13.8 G/DL (ref 12–16)
IMM GRANULOCYTES # BLD AUTO: 0.01 10*3/MM3 (ref 0–0.05)
IMM GRANULOCYTES NFR BLD AUTO: 0.3 % (ref 0–5)
LYMPHOCYTES # BLD AUTO: 0.72 10*3/MM3 (ref 0.72–4.86)
LYMPHOCYTES NFR BLD AUTO: 19.3 % (ref 15–45)
MCH RBC QN AUTO: 28.6 PG (ref 28–32)
MCHC RBC AUTO-ENTMCNC: 32.5 G/DL (ref 33–36)
MCV RBC AUTO: 87.8 FL (ref 82–98)
MONOCYTES # BLD AUTO: 0.38 10*3/MM3 (ref 0.19–1.3)
MONOCYTES NFR BLD AUTO: 10.2 % (ref 4–12)
NEUTROPHILS # BLD AUTO: 2.45 10*3/MM3 (ref 1.87–8.4)
NEUTROPHILS NFR BLD AUTO: 65.4 % (ref 39–78)
NRBC BLD AUTO-RTO: 0 /100 WBC (ref 0–0.2)
PLATELET # BLD AUTO: 198 10*3/MM3 (ref 130–400)
PMV BLD AUTO: 9.5 FL (ref 6–12)
POTASSIUM BLD-SCNC: 4.4 MMOL/L (ref 3.5–5.3)
PROT SERPL-MCNC: 7.2 G/DL (ref 6.3–8.7)
RBC # BLD AUTO: 4.83 10*6/MM3 (ref 4.2–5.4)
SODIUM BLD-SCNC: 143 MMOL/L (ref 135–145)
WBC NRBC COR # BLD: 3.74 10*3/MM3 (ref 4.8–10.8)

## 2019-07-30 PROCEDURE — 36415 COLL VENOUS BLD VENIPUNCTURE: CPT | Performed by: INTERNAL MEDICINE

## 2019-07-30 PROCEDURE — 80053 COMPREHEN METABOLIC PANEL: CPT | Performed by: INTERNAL MEDICINE

## 2019-07-30 PROCEDURE — 82378 CARCINOEMBRYONIC ANTIGEN: CPT | Performed by: INTERNAL MEDICINE

## 2019-07-30 PROCEDURE — 85025 COMPLETE CBC W/AUTO DIFF WBC: CPT | Performed by: INTERNAL MEDICINE

## 2019-07-30 PROCEDURE — 86300 IMMUNOASSAY TUMOR CA 15-3: CPT | Performed by: INTERNAL MEDICINE

## 2019-07-31 LAB — CANCER AG27-29 SERPL-ACNC: 19.2 U/ML (ref 0–38.6)

## 2019-08-06 ENCOUNTER — TRANSCRIBE ORDERS (OUTPATIENT)
Dept: ADMINISTRATIVE | Facility: HOSPITAL | Age: 64
End: 2019-08-06

## 2019-08-06 DIAGNOSIS — Z78.0 POSTMENOPAUSAL: Primary | ICD-10-CM

## 2019-09-24 DIAGNOSIS — C50.919 MALIGNANT NEOPLASM OF FEMALE BREAST, UNSPECIFIED ESTROGEN RECEPTOR STATUS, UNSPECIFIED LATERALITY, UNSPECIFIED SITE OF BREAST (HCC): Primary | ICD-10-CM

## 2019-09-30 ENCOUNTER — TRANSCRIBE ORDERS (OUTPATIENT)
Dept: ADMINISTRATIVE | Facility: HOSPITAL | Age: 64
End: 2019-09-30

## 2019-09-30 DIAGNOSIS — Z12.31 ENCOUNTER FOR SCREENING MAMMOGRAM FOR MALIGNANT NEOPLASM OF BREAST: Primary | ICD-10-CM

## 2019-10-28 ENCOUNTER — HOSPITAL ENCOUNTER (OUTPATIENT)
Dept: MAMMOGRAPHY | Facility: HOSPITAL | Age: 64
Discharge: HOME | End: 2019-10-28
Admitting: SPECIALIST

## 2019-10-28 DIAGNOSIS — Z12.31 ENCOUNTER FOR SCREENING MAMMOGRAM FOR MALIGNANT NEOPLASM OF BREAST: ICD-10-CM

## 2019-10-28 DIAGNOSIS — Z85.3 HISTORY OF BREAST CANCER: ICD-10-CM

## 2019-10-28 PROCEDURE — G0279 TOMOSYNTHESIS, MAMMO: HCPCS

## 2019-10-28 PROCEDURE — 77066 DX MAMMO INCL CAD BI: CPT

## 2019-10-31 ENCOUNTER — OFFICE VISIT (OUTPATIENT)
Dept: RADIATION ONCOLOGY | Facility: HOSPITAL | Age: 64
End: 2019-10-31

## 2019-10-31 ENCOUNTER — HOSPITAL ENCOUNTER (OUTPATIENT)
Dept: RADIATION ONCOLOGY | Facility: HOSPITAL | Age: 64
Setting detail: RADIATION/ONCOLOGY SERIES
End: 2019-10-31

## 2019-10-31 VITALS
DIASTOLIC BLOOD PRESSURE: 64 MMHG | SYSTOLIC BLOOD PRESSURE: 120 MMHG | WEIGHT: 148 LBS | BODY MASS INDEX: 23.78 KG/M2 | HEIGHT: 66 IN

## 2019-10-31 DIAGNOSIS — Z78.9 NON-SMOKER: ICD-10-CM

## 2019-10-31 DIAGNOSIS — Z98.890 S/P LYMPH NODE BIOPSY: ICD-10-CM

## 2019-10-31 DIAGNOSIS — Z92.3 HISTORY OF RADIATION THERAPY: ICD-10-CM

## 2019-10-31 DIAGNOSIS — Z79.811 PROPHYLACTIC USE OF LETROZOLE (FEMARA): ICD-10-CM

## 2019-10-31 DIAGNOSIS — Z98.890 S/P LUMPECTOMY, RIGHT BREAST: ICD-10-CM

## 2019-10-31 DIAGNOSIS — C50.411 MALIGNANT NEOPLASM OF UPPER-OUTER QUADRANT OF RIGHT BREAST IN FEMALE, ESTROGEN RECEPTOR POSITIVE (HCC): Primary | ICD-10-CM

## 2019-10-31 DIAGNOSIS — Z17.0 MALIGNANT NEOPLASM OF UPPER-OUTER QUADRANT OF RIGHT BREAST IN FEMALE, ESTROGEN RECEPTOR POSITIVE (HCC): Primary | ICD-10-CM

## 2019-10-31 PROCEDURE — G0463 HOSPITAL OUTPT CLINIC VISIT: HCPCS | Performed by: RADIOLOGY

## 2019-11-06 ENCOUNTER — TRANSCRIBE ORDERS (OUTPATIENT)
Dept: ADMINISTRATIVE | Facility: HOSPITAL | Age: 64
End: 2019-11-06

## 2019-11-06 DIAGNOSIS — Z85.3 HISTORY OF BREAST CANCER: Primary | ICD-10-CM

## 2019-12-03 ENCOUNTER — LAB (OUTPATIENT)
Dept: LAB | Facility: HOSPITAL | Age: 64
End: 2019-12-03

## 2019-12-03 DIAGNOSIS — C50.919 MALIGNANT NEOPLASM OF FEMALE BREAST, UNSPECIFIED ESTROGEN RECEPTOR STATUS, UNSPECIFIED LATERALITY, UNSPECIFIED SITE OF BREAST (HCC): ICD-10-CM

## 2019-12-03 LAB
ALBUMIN SERPL-MCNC: 4.7 G/DL (ref 3.5–5.2)
ALBUMIN/GLOB SERPL: 1.7 G/DL
ALP SERPL-CCNC: 83 U/L (ref 39–117)
ALT SERPL W P-5'-P-CCNC: 11 U/L (ref 1–33)
ANION GAP SERPL CALCULATED.3IONS-SCNC: 10 MMOL/L (ref 5–15)
AST SERPL-CCNC: 15 U/L (ref 1–32)
BASOPHILS # BLD AUTO: 0.05 10*3/MM3 (ref 0–0.2)
BASOPHILS NFR BLD AUTO: 1.3 % (ref 0–1.5)
BILIRUB SERPL-MCNC: 0.5 MG/DL (ref 0.2–1.2)
BUN BLD-MCNC: 16 MG/DL (ref 8–23)
BUN/CREAT SERPL: 25 (ref 7–25)
CALCIUM SPEC-SCNC: 9.6 MG/DL (ref 8.6–10.5)
CEA SERPL-MCNC: 2.18 NG/ML
CHLORIDE SERPL-SCNC: 103 MMOL/L (ref 98–107)
CO2 SERPL-SCNC: 30 MMOL/L (ref 22–29)
CREAT BLD-MCNC: 0.64 MG/DL (ref 0.57–1)
DEPRECATED RDW RBC AUTO: 39.9 FL (ref 37–54)
EOSINOPHIL # BLD AUTO: 0.1 10*3/MM3 (ref 0–0.4)
EOSINOPHIL NFR BLD AUTO: 2.6 % (ref 0.3–6.2)
ERYTHROCYTE [DISTWIDTH] IN BLOOD BY AUTOMATED COUNT: 12.8 % (ref 12.3–15.4)
GFR SERPL CREATININE-BSD FRML MDRD: 93 ML/MIN/1.73
GLOBULIN UR ELPH-MCNC: 2.8 GM/DL
GLUCOSE BLD-MCNC: 88 MG/DL (ref 65–99)
HCT VFR BLD AUTO: 43.3 % (ref 34–46.6)
HGB BLD-MCNC: 14.3 G/DL (ref 12–15.9)
IMM GRANULOCYTES # BLD AUTO: 0.01 10*3/MM3 (ref 0–0.05)
IMM GRANULOCYTES NFR BLD AUTO: 0.3 % (ref 0–0.5)
LYMPHOCYTES # BLD AUTO: 0.87 10*3/MM3 (ref 0.7–3.1)
LYMPHOCYTES NFR BLD AUTO: 23 % (ref 19.6–45.3)
MCH RBC QN AUTO: 28.3 PG (ref 26.6–33)
MCHC RBC AUTO-ENTMCNC: 33 G/DL (ref 31.5–35.7)
MCV RBC AUTO: 85.6 FL (ref 79–97)
MONOCYTES # BLD AUTO: 0.42 10*3/MM3 (ref 0.1–0.9)
MONOCYTES NFR BLD AUTO: 11.1 % (ref 5–12)
NEUTROPHILS # BLD AUTO: 2.33 10*3/MM3 (ref 1.7–7)
NEUTROPHILS NFR BLD AUTO: 61.7 % (ref 42.7–76)
NRBC BLD AUTO-RTO: 0 /100 WBC (ref 0–0.2)
PLATELET # BLD AUTO: 218 10*3/MM3 (ref 140–450)
PMV BLD AUTO: 9.3 FL (ref 6–12)
POTASSIUM BLD-SCNC: 4.1 MMOL/L (ref 3.5–5.2)
PROT SERPL-MCNC: 7.5 G/DL (ref 6–8.5)
RBC # BLD AUTO: 5.06 10*6/MM3 (ref 3.77–5.28)
SODIUM BLD-SCNC: 143 MMOL/L (ref 136–145)
WBC NRBC COR # BLD: 3.78 10*3/MM3 (ref 3.4–10.8)

## 2019-12-03 PROCEDURE — 85025 COMPLETE CBC W/AUTO DIFF WBC: CPT

## 2019-12-03 PROCEDURE — 86300 IMMUNOASSAY TUMOR CA 15-3: CPT

## 2019-12-03 PROCEDURE — 36415 COLL VENOUS BLD VENIPUNCTURE: CPT

## 2019-12-03 PROCEDURE — 80053 COMPREHEN METABOLIC PANEL: CPT

## 2019-12-03 PROCEDURE — 82378 CARCINOEMBRYONIC ANTIGEN: CPT

## 2019-12-03 NOTE — PROGRESS NOTES
[facility]EMATOLOGY AND ONCOLOGY  [address]  [place] [address] [phone]    Patient Name: Adriana Samuels  Encounter Date: 12/10/2019  YOB: 1955  Patient Number: [id number]      REASON FOR FOLLOW-UP: Ms. Adriana Samuels is a pleasant 64-year-old  female, post menopausal, who is seen on followup for Stage IIA right breast cancer.  She is seen 12.5 months from 4 cycles of adjuvant Taxotere and Cytoxan.  She declined adjuvant Femara.  She is seen alone.  She is a reliable historian.      Oncology/Hematology History    DIAGNOSTIC ABNORMALITIES:   The patient had a mammogram from [facility] that showed a lesion 5.6 mm at the 10 o'clock position also noted on ultrasound suspicious for malignancy. The patient was referred for nodule in the right upper outer breast.  The patient was seen by Dr. Lawrence 09/05/2018.  Examination showed small palpable abnormality at 10 and 11 o'clock positions, 0.5 cm to 1 cm in size. No adenopathy noted. Patient planned for excision and sentinel node evaluation.  Pathology report 09/11/2018: Invasive ductal carcinoma intermediate grade, 1 out of 2 sentinel lymph nodes positive for metastasis. The largest metastatic focus is 2.5 mm. Negative for extracapsular extension. Tumor measures 9 mm in greatest linear dimension. Margins of resection are negative. Tumor measures 2 mm from closest anterior margin. AJCC T1b, N1a. ER/WI positive and HER-2/gregory negative.  CT abdomen and pelvis performed at [facility] on 09/21/2018 was revealing for IMPRESSION: 1. Post therapy changes right breast and axilla. 2. Linear nodularity left lung base, suspect chronic changes. 3. Otherwise, No CT evidence of definite metastatic disease in chest or acute cardiopulmonary process.   Bone scan performed at [facility] on 09/21/2018 was revealing for IMPRESSION: 1. Focal increased tracer activity identified in  the left lateral mid-cervical spine, suspect degenerative changes. Plain radiographic imaging could further characterize. 2. Otherwise, No scintigraphic evidence of osseous metastases.   Ultrasound of liver performed at [facility] on 09/26/2018 was revealing for Summary:  Multiple hepatic cysts.      PREVIOUS INTERVENTIONS:   The patient had undergone right breast lumpectomy with sentinel lymph node biopsy 09/10/2018 by Dr. Lawrence.  Adjuvant Taxotere and Cytoxan 09/25/2018 through 11/27/2018 at the [facility]. 4 cycles.   Adjuvant radiation end 02/2019 by Dr. Williamson.  Adjuvant Femara 2.5 mg po daily, she declined.        Malignant neoplasm of upper-outer quadrant of right female breast (CMS/HCC)     Initial Diagnosis     Malignant neoplasm of right female breast (CMS/HCC)         9/10/2018 Surgery     Final Diagnosis   1.  Breast, right, lumpectomy and sentinel lymph node resection:  Invasive ductal carcinoma, intermediate grade  Tumor measures 9 mm in greatest linear dimension  Margins of resection are negative  Tumor measures 2 mm from the closest, anterior, margin.     Positive for metastatic ductal carcinoma in one out of 2 sentinel lymph nodes (1/2)  The largest metastatic focus is 2.5 mm  Negative for extra capsular extension     AJCC cancer stage: pT1b, (sn)pN1a                9/21/2018 Imaging     Ct Chest With Contrast    Result Date: 9/21/2018  1. Post therapy changes right breast and axilla. 2. Linear nodularity left lung base, suspect chronic changes. 3. Otherwise, No CT evidence of definite metastatic disease in chest or acute cardiopulmonary process. This report was finalized on 09/21/2018 13:03 by Dr. Molina Salazar MD.    Nm Bone Scan Whole Body    Result Date: 9/21/2018  1. Focal increased tracer activity identified in the left lateral mid cervical spine, suspect degenerative changes. Plain radiographic imaging could further characterize. 2. Otherwise, No scintigraphic evidence of osseous  metastases. This report was finalized on 09/21/2018 14:21 by Dr. Molina Salazar MD.    Ct Abdomen Pelvis With Contrast    Result Date: 9/21/2018  1. Multiple low-attenuation hepatic lesions, hepatic cyst most likely. 2. Suspect uterine fibroid 3. Otherwise, No CT evidence of metastatic disease or acute intra-abdominal/pelvic pathological process. This report was finalized on 09/21/2018 13:12 by Dr. Molina Salazar MD.           9/25/2018 - 11/27/2018 Chemotherapy     Taxotere Cytoxan         1/15/2019 - 2/25/2019 Radiation     Radiation OncologyTreatment Course:  Adriana Samuels received 6000 cGy in 30 fractions to right breast, right supraclavicular area, and right axilla via external beam radiation therapy.         10/28/2019 Imaging     Bilateral Diagnostic Mammogram:  IMPRESSION:  Posttherapeutic changes of the right breast are stable over  a one year time period and no suspicious findings are identified. The  patient is due to return in 12 months for bilateral digital mammograms.  BI-RADS Category 2, benign.                  Malignant neoplasm of upper-outer quadrant of right female breast (CMS/HCC)     Initial Diagnosis     Malignant neoplasm of right female breast (CMS/HCC)      9/10/2018 Surgery     Final Diagnosis   1.  Breast, right, lumpectomy and sentinel lymph node resection:  Invasive ductal carcinoma, intermediate grade  Tumor measures 9 mm in greatest linear dimension  Margins of resection are negative  Tumor measures 2 mm from the closest, anterior, margin.     Positive for metastatic ductal carcinoma in one out of 2 sentinel lymph nodes (1/2)  The largest metastatic focus is 2.5 mm  Negative for extra capsular extension     AJCC cancer stage: pT1b, (sn)pN1a             9/21/2018 Imaging     Ct Chest With Contrast    Result Date: 9/21/2018  1. Post therapy changes right breast and axilla. 2. Linear nodularity left lung base, suspect chronic changes. 3. Otherwise, No CT evidence of definite metastatic  disease in chest or acute cardiopulmonary process. This report was finalized on 09/21/2018 13:03 by Dr. Molina Salazar MD.    Nm Bone Scan Whole Body    Result Date: 9/21/2018  1. Focal increased tracer activity identified in the left lateral mid cervical spine, suspect degenerative changes. Plain radiographic imaging could further characterize. 2. Otherwise, No scintigraphic evidence of osseous metastases. This report was finalized on 09/21/2018 14:21 by Dr. Molina Salazar MD.    Ct Abdomen Pelvis With Contrast    Result Date: 9/21/2018  1. Multiple low-attenuation hepatic lesions, hepatic cyst most likely. 2. Suspect uterine fibroid 3. Otherwise, No CT evidence of metastatic disease or acute intra-abdominal/pelvic pathological process. This report was finalized on 09/21/2018 13:12 by Dr. Molina Salazar MD.        9/25/2018 - 11/27/2018 Chemotherapy     Taxotere Cytoxan      1/15/2019 - 2/25/2019 Radiation     Radiation OncologyTreatment Course:  Adriana Samuels received 6000 cGy in 30 fractions to right breast, right supraclavicular area, and right axilla via external beam radiation therapy.      10/28/2019 Imaging     Bilateral Diagnostic Mammogram:  IMPRESSION:  Posttherapeutic changes of the right breast are stable over  a one year time period and no suspicious findings are identified. The  patient is due to return in 12 months for bilateral digital mammograms.  BI-RADS Category 2, benign.         PAST MEDICAL HISTORY:  ALLERGIES:  Allergies   Allergen Reactions   • Bactrim [Sulfamethoxazole-Trimethoprim] Rash   • Cortisone Rash   • Decadron [Dexamethasone] Hives   • Hydrocortisone Hives     Oral or IV   • Erythromycin Nausea Only and Rash     CURRENT MEDICATIONS:  Outpatient Encounter Medications as of 12/10/2019   Medication Sig Dispense Refill   • aspirin 81 MG chewable tablet Chew 81 mg Daily.     • Aspirin-Acetaminophen-Caffeine (EXCEDRIN PO) Take 1 tablet by mouth Daily As Needed.     • Biotin 1000 MCG  chewable tablet Chew.     • Calcium Carb-Cholecalciferol (CALCIUM 1000 + D PO) Take  by mouth.     • glucosamine sulfate 500 MG capsule capsule Take  by mouth 3 (Three) Times a Day With Meals.     • Loratadine (CLARITIN PO) Take  by mouth.     • Multiple Vitamins-Minerals (MULTIVITAMIN WITH MINERALS) tablet tablet Take 1 tablet by mouth Daily.     • Probiotic Product (PROBIOTIC PO) Take  by mouth.       No facility-administered encounter medications on file as of 12/10/2019.      ADULT ILLNESSES:  Patient Active Problem List   Diagnosis Code   • Malignant neoplasm of upper-outer quadrant of right female breast (CMS/HCC) C50.411   • Encounter for consultation Z71.9   • Non-smoker Z78.9   • S/P lumpectomy, right breast Z98.890   • S/P lymph node biopsy Z98.890   • Encounter to discuss procedure Z71.89   • History of radiation therapy Z92.3   • Prophylactic use of letrozole (Femara) Z79.811   • Encounter for screening for malignant neoplasm of colon Z12.11     SURGERIES:  Past Surgical History:   Procedure Laterality Date   • BREAST BIOPSY Right 09/2018    positive   • BREAST LUMPECTOMY Right 09/2018    lumpectomy with chemo and rad tx   • BREAST LUMPECTOMY WITH SENTINEL NODE BIOPSY Right 9/10/2018    Procedure: RIGHT BREAST LUMPECTOMY WITH SENTINEL LYMPH NODE BIOPSY, INJECTION AND SCAN;  Surgeon: Duglas Lawrence MD;  Location: [facility] OR;  Service: General   • COLONOSCOPY N/A 6/6/2019    Procedure: COLONOSCOPY WITH ANESTHESIA;  Surgeon: Brock Raman MD;  Location: [facility] ENDOSCOPY;  Service: Gastroenterology   • HYDROCELE EXCISION / REPAIR     • LASER ABLATION OF THE CERVIX     • WRIST FUSION       HEALTH MAINTENANCE ITEMS:  Health Maintenance Due   Topic Date Due   • TDAP/TD VACCINES (1 - Tdap) 08/10/1965   • ZOSTER VACCINE (1 of 2) 08/10/2004   • HEPATITIS C SCREENING  09/06/2018   • MEDICARE ANNUAL WELLNESS  09/06/2018   • INFLUENZA VACCINE  08/01/2019   • PNEUMOCOCCAL VACCINES (65+ LOW/MEDIUM RISK) (1 of 2  "- PCV13) 08/10/2019       <no information>  Last Completed Colonoscopy       Status Date      COLONOSCOPY Done 6/6/2019 COLONOSCOPY     Patient has more history with this topic...          There is no immunization history on file for this patient.  Last Completed Mammogram       Status Date      MAMMOGRAM Done 10/28/2019 MAMMO DIAGNOSTIC DIGITAL TOMOSYNTHESIS BILATERAL W CAD     Patient has more history with this topic...            FAMILY HISTORY:  Family History   Problem Relation Age of Onset   • Skin cancer Father    • Breast cancer Neg Hx    • Colon cancer Neg Hx    • Colon polyps Neg Hx      SOCIAL HISTORY:  Social History     Socioeconomic History   • Marital status:      Spouse name: Not on file   • Number of children: Not on file   • Years of education: Not on file   • Highest education level: Not on file   Tobacco Use   • Smoking status: Never Smoker   • Smokeless tobacco: Never Used   Substance and Sexual Activity   • Alcohol use: Yes     Comment: RARELY   • Drug use: No   • Sexual activity: Defer       REVIEW OF SYSTEMS:    Review of Systems   Constitutional: Negative for activity change, appetite change, chills, diaphoresis, fatigue, fever and unexpected weight loss.        \"I feel fine.\"   HENT: Negative for congestion, ear pain, nosebleeds, sinus pressure, sore throat and trouble swallowing.    Eyes: Negative for blurred vision, double vision, pain and visual disturbance.   Respiratory: Negative for cough, shortness of breath and wheezing.    Cardiovascular: Negative for chest pain, palpitations and leg swelling.   Gastrointestinal: Negative for abdominal distention, abdominal pain, blood in stool, constipation, diarrhea, nausea and vomiting.   Endocrine: Negative for cold intolerance and heat intolerance.   Genitourinary: Negative for breast lump, dysuria, frequency, hematuria, urgency, urinary incontinence and vaginal bleeding.   Musculoskeletal: Negative for arthralgias, myalgias and neck " "stiffness.   Skin: Negative for pallor.   Allergic/Immunologic: Negative for food allergies.   Neurological: Negative for dizziness, tremors, seizures, syncope, speech difficulty, weakness, headache and confusion.   Hematological: Negative for adenopathy. Does not bruise/bleed easily.   Psychiatric/Behavioral: Negative for agitation, dysphoric mood and depressed mood. The patient is not nervous/anxious.        VITAL SIGNS: /60   Pulse 76   Temp 98.2 °F (36.8 °C)   Resp 18   Ht 167.6 cm (66\")   Wt 67.5 kg (148 lb 14.4 oz)   SpO2 97%   Breastfeeding No   BMI 24.03 kg/m²  Body surface area is 1.76 meters squared.  Pain Score    12/10/19 0838   PainSc: 0-No pain         PHYSICAL EXAMINATION:     Physical Exam   Constitutional: She is oriented to person, place, and time. She appears well-developed and well-nourished. No distress.   HENT:   Head: Normocephalic and atraumatic.   Eyes: Pupils are equal, round, and reactive to light. EOM are normal. No scleral icterus.   Neck: Trachea normal. Neck supple.   Cardiovascular: Normal rate, regular rhythm and normal pulses.   No murmur heard.  Pulmonary/Chest: Effort normal and breath sounds normal. She has no wheezes. She has no rhonchi. She has no rales. Chest wall is not dull to percussion.   Lumpectomy scar, right.  No sign of recurrence.  Examined with Yessy.    Abdominal: Soft. Normal appearance and bowel sounds are normal. She exhibits no distension. There is no tenderness. There is no rebound and no guarding.   Musculoskeletal: She exhibits no edema.   Lymphadenopathy:     She has no cervical adenopathy.     She has no axillary adenopathy.        Right: No inguinal and no supraclavicular adenopathy present.        Left: No inguinal and no supraclavicular adenopathy present.   Neurological: She is alert and oriented to person, place, and time. She has normal strength. No sensory deficit.   Skin: Skin is warm and dry. She is not diaphoretic. No pallor. "   Psychiatric: She has a normal mood and affect. Her behavior is normal. Judgment and thought content normal.   Vitals reviewed.      LABS    Lab Results - Last 18 Months   Lab Units 12/03/19  0800 07/30/19  0758 04/02/19  0852 09/07/18  1308   HEMOGLOBIN g/dL 14.3 13.8 14.1 13.7   HEMATOCRIT % 43.3 42.4 42.9 41.7   MCV fL 85.6 87.8 83.0 85.8   WBC 10*3/mm3 3.78 3.74* 3.90* 5.84   RDW % 12.8 13.2 12.5 12.9   MPV fL 9.3 9.5 9.5 10.0   PLATELETS 10*3/mm3 218 198 208 214   IMM GRAN % % 0.3 0.3 0.3 0.3   NEUTROS ABS 10*3/mm3 2.33 2.45 2.63 3.68   LYMPHS ABS 10*3/mm3 0.87 0.72 0.62* 1.49   MONOS ABS 10*3/mm3 0.42 0.38 0.48 0.56   EOS ABS 10*3/mm3 0.10 0.16 0.13 0.06   BASOS ABS 10*3/mm3 0.05 0.02 0.03 0.03   IMMATURE GRANS (ABS) 10*3/mm3 0.01 0.01 0.01 0.02   NRBC /100 WBC 0.0 0.0 0.0 0.0       Lab Results - Last 18 Months   Lab Units 12/03/19  0800 07/30/19  0758 04/02/19  0852 09/21/18  1052 09/07/18  1308   GLUCOSE mg/dL 88 68* 96  --  82   SODIUM mmol/L 143 143 142  --  144   POTASSIUM mmol/L 4.1 4.4 4.6  --  4.3   CO2 mmol/L 30.0* 31.0 32.0*  --  30.0   CHLORIDE mmol/L 103 106 101  --  105   ANION GAP mmol/L 10.0 6.0 9.0  --  9.0   CREATININE mg/dL 0.64 0.71 0.62 0.70 0.67   BUN mg/dL 16 15 21  --  18   BUN / CREAT RATIO  25.0 21.1 33.9*  --  26.9*   CALCIUM mg/dL 9.6 9.3 9.8  --  9.0   EGFR IF NONAFRICN AM mL/min/1.73 93 83 97  --  89   ALK PHOS U/L 83 75 74  --  69   TOTAL PROTEIN g/dL 7.5 7.2 7.3  --  7.0   ALT (SGPT) U/L 11 18 <15  --  19   AST (SGOT) U/L 15 26 24  --  24   BILIRUBIN mg/dL 0.5 0.7 0.7  --  0.5   ALBUMIN g/dL 4.70 4.20 4.20  --  4.30   GLOBULIN gm/dL 2.8 3.0 3.1  --  2.7       Lab Results - Last 18 Months   Lab Units 12/03/19  0800 07/30/19  0758 04/02/19  0852 01/08/19  0900   CEA ng/mL 2.18 2.27 1.83 1.12       No results for input(s): IRON, TIBC, LABIRON, FERRITIN, [id number], TSH, FOLATE in the last 41876 hours.    Invalid input(s): VITB12    Adriana Samuels reports a pain score of 0.   "    Patient's Body mass index is 24.03 kg/m². BMI is within normal parameters. No follow-up required..      ASSESSMENT:  1.    Carcinoma of the right upper outer breast. Invasive ductal carcinoma:  Current Stage: AJCC IIA (pT1b, pN1a, M0, G2)   Tumor size 9 mm.  Hormone Status: %, [place] 86%, and negative HER-2/gregory.  Baseline Node Status: 1/2 sentinel node positive for metastasis.   Complications of Tumor: None.   Treatment status: Post adjuvant Taxotere and Cytoxan.   Post adjuvant radiation 02/2019.     Prognosis: Fair.  2.    Performance status of 0.   3.    Self directed care.   4.    Osteopenia. On Caltrate D.  5.    Internal hemorrhoids.   6.    Elevated CEA, history of.    7.    Rash from Decadron.  8.    Grade 1 nausea from chemo, resolved.  9.    Grade 1 fatigue from chemo, resolved.       PLAN:  1.     Re:  Note of Ms. Florian Alyssia FRANCES 10/31/2019 was reviewed.  2.     Re:  Heme status.  Hemoglobin 14.3.  3.     Re:  Pre-office CMP.  GFR 93 ml/minute.   4.     Re:  Pre-office CEA.  Normal at 2.18.  5.     Re:  Pre-office CA27-29.  Normal at 16.3.   6.     Re:  Start adjuvant Femara. Patient declined.  \"No. Not interested  I am doing okay.\"   7.    Adjuvant Femara to start if patient agrees.  \"I am doing fine.\"   8.    Schedule baseline bone density tests, pre-Femara.    9.    eRx for Femara 2.5 mg p.o. daily, 60, 3 refills if she agrees.  Observe for adverse events.  Aware of potential hot flashes, bone loss, weight gain, and thrombosis.   10.  Continue ongoing management per primary care physician and other specialists.  11.  Continue currently identified medications.  12.  Plan of care discussed with patient.  Understanding expressed.  Patient is agreeable to proceed.  13.  She will be seen every 4 months for the first 2 years then every 6 months for the next 3 years.  Annual mammogram.   14.  Return to the [place] office in 4 months with pre-office CBC with " "differential, CMP, CA27-29, and CEA.   15.  Flu shot today.  She declined.  \"No.\"      TIME SPENT:  Face-to-face time on this encounter, as defined by the American Medical Association in the 2019 Current Procedural Terminology codebook; assessment, record review, lab review, planning and education is 27 minutes.      cc: Arie Renteria MD (PCP)        Duglas Lawrence MD (referring)        Jonathan Williamson MD        "

## 2019-12-04 LAB — CANCER AG27-29 SERPL-ACNC: 16.3 U/ML (ref 0–38.6)

## 2019-12-10 ENCOUNTER — OFFICE VISIT (OUTPATIENT)
Dept: ONCOLOGY | Facility: CLINIC | Age: 64
End: 2019-12-10

## 2019-12-10 VITALS
BODY MASS INDEX: 23.93 KG/M2 | HEIGHT: 66 IN | HEART RATE: 76 BPM | RESPIRATION RATE: 18 BRPM | OXYGEN SATURATION: 97 % | WEIGHT: 148.9 LBS | DIASTOLIC BLOOD PRESSURE: 60 MMHG | SYSTOLIC BLOOD PRESSURE: 120 MMHG | TEMPERATURE: 98.2 F

## 2019-12-10 DIAGNOSIS — C50.411 MALIGNANT NEOPLASM OF UPPER-OUTER QUADRANT OF RIGHT BREAST IN FEMALE, ESTROGEN RECEPTOR POSITIVE (HCC): Primary | ICD-10-CM

## 2019-12-10 DIAGNOSIS — Z17.0 MALIGNANT NEOPLASM OF UPPER-OUTER QUADRANT OF RIGHT BREAST IN FEMALE, ESTROGEN RECEPTOR POSITIVE (HCC): Primary | ICD-10-CM

## 2019-12-10 PROCEDURE — 99214 OFFICE O/P EST MOD 30 MIN: CPT | Performed by: INTERNAL MEDICINE

## 2020-04-10 ENCOUNTER — LAB (OUTPATIENT)
Dept: LAB | Facility: HOSPITAL | Age: 65
End: 2020-04-10

## 2020-04-10 DIAGNOSIS — Z17.0 MALIGNANT NEOPLASM OF UPPER-OUTER QUADRANT OF RIGHT BREAST IN FEMALE, ESTROGEN RECEPTOR POSITIVE (HCC): ICD-10-CM

## 2020-04-10 DIAGNOSIS — C50.411 MALIGNANT NEOPLASM OF UPPER-OUTER QUADRANT OF RIGHT BREAST IN FEMALE, ESTROGEN RECEPTOR POSITIVE (HCC): ICD-10-CM

## 2020-04-10 LAB
ALBUMIN SERPL-MCNC: 4.5 G/DL (ref 3.5–5.2)
ALBUMIN/GLOB SERPL: 1.8 G/DL
ALP SERPL-CCNC: 78 U/L (ref 39–117)
ALT SERPL W P-5'-P-CCNC: 17 U/L (ref 1–33)
ANION GAP SERPL CALCULATED.3IONS-SCNC: 10 MMOL/L (ref 5–15)
AST SERPL-CCNC: 19 U/L (ref 1–32)
BASOPHILS # BLD AUTO: 0.06 10*3/MM3 (ref 0–0.2)
BASOPHILS NFR BLD AUTO: 1.7 % (ref 0–1.5)
BILIRUB SERPL-MCNC: 0.5 MG/DL (ref 0.2–1.2)
BUN BLD-MCNC: 17 MG/DL (ref 8–23)
BUN/CREAT SERPL: 27.4 (ref 7–25)
CALCIUM SPEC-SCNC: 9.5 MG/DL (ref 8.6–10.5)
CEA SERPL-MCNC: 2.08 NG/ML
CHLORIDE SERPL-SCNC: 104 MMOL/L (ref 98–107)
CO2 SERPL-SCNC: 29 MMOL/L (ref 22–29)
CREAT BLD-MCNC: 0.62 MG/DL (ref 0.57–1)
DEPRECATED RDW RBC AUTO: 42 FL (ref 37–54)
EOSINOPHIL # BLD AUTO: 0.11 10*3/MM3 (ref 0–0.4)
EOSINOPHIL NFR BLD AUTO: 3.1 % (ref 0.3–6.2)
ERYTHROCYTE [DISTWIDTH] IN BLOOD BY AUTOMATED COUNT: 13.2 % (ref 12.3–15.4)
GFR SERPL CREATININE-BSD FRML MDRD: 97 ML/MIN/1.73
GLOBULIN UR ELPH-MCNC: 2.5 GM/DL
GLUCOSE BLD-MCNC: 92 MG/DL (ref 65–99)
HCT VFR BLD AUTO: 42.7 % (ref 34–46.6)
HGB BLD-MCNC: 13.6 G/DL (ref 12–15.9)
IMM GRANULOCYTES # BLD AUTO: 0 10*3/MM3 (ref 0–0.05)
IMM GRANULOCYTES NFR BLD AUTO: 0 % (ref 0–0.5)
LYMPHOCYTES # BLD AUTO: 0.9 10*3/MM3 (ref 0.7–3.1)
LYMPHOCYTES NFR BLD AUTO: 25.7 % (ref 19.6–45.3)
MCH RBC QN AUTO: 27.8 PG (ref 26.6–33)
MCHC RBC AUTO-ENTMCNC: 31.9 G/DL (ref 31.5–35.7)
MCV RBC AUTO: 87.1 FL (ref 79–97)
MONOCYTES # BLD AUTO: 0.42 10*3/MM3 (ref 0.1–0.9)
MONOCYTES NFR BLD AUTO: 12 % (ref 5–12)
NEUTROPHILS # BLD AUTO: 2.01 10*3/MM3 (ref 1.7–7)
NEUTROPHILS NFR BLD AUTO: 57.5 % (ref 42.7–76)
NRBC BLD AUTO-RTO: 0 /100 WBC (ref 0–0.2)
PLATELET # BLD AUTO: 217 10*3/MM3 (ref 140–450)
PMV BLD AUTO: 10 FL (ref 6–12)
POTASSIUM BLD-SCNC: 4.5 MMOL/L (ref 3.5–5.2)
PROT SERPL-MCNC: 7 G/DL (ref 6–8.5)
RBC # BLD AUTO: 4.9 10*6/MM3 (ref 3.77–5.28)
SODIUM BLD-SCNC: 143 MMOL/L (ref 136–145)
WBC NRBC COR # BLD: 3.5 10*3/MM3 (ref 3.4–10.8)

## 2020-04-10 PROCEDURE — 85025 COMPLETE CBC W/AUTO DIFF WBC: CPT

## 2020-04-10 PROCEDURE — 80053 COMPREHEN METABOLIC PANEL: CPT

## 2020-04-10 PROCEDURE — 82378 CARCINOEMBRYONIC ANTIGEN: CPT

## 2020-04-10 PROCEDURE — 36415 COLL VENOUS BLD VENIPUNCTURE: CPT

## 2020-04-10 PROCEDURE — 86300 IMMUNOASSAY TUMOR CA 15-3: CPT

## 2020-04-11 LAB — CANCER AG27-29 SERPL-ACNC: 14.5 U/ML (ref 0–38.6)

## 2020-04-14 ENCOUNTER — TELEPHONE (OUTPATIENT)
Dept: ONCOLOGY | Facility: CLINIC | Age: 65
End: 2020-04-14

## 2020-04-14 NOTE — TELEPHONE ENCOUNTER
Called and spoke with patient.  Went over labs, everything looked good.  She wanted to push appointment out another 4months.  She is not on Femara.  I did tell her that we could push it out several weeks, but not another 4 months.  We would still need to follow up with her to examine her as well.  I told her we would push it out 6-8 weeks.  She is agreeable with that.  She is to call in the mean time if she notices any thing new or has any problems.  Will have scheduling call her to reschedule follow up.

## 2020-04-14 NOTE — TELEPHONE ENCOUNTER
Pt called requesting tele visit for 4/17/20 appt.    Call pt at [phone] or [phone] in regards to this

## 2020-04-28 NOTE — PROGRESS NOTES
"[facility]  MD Chiqui Barry, WILBERTN, PA-C  _______________________________________________  [facility]  Department of Radiation Oncology  [address] [place] [id number]  Office: [phone]  Fax: [phone]    DATE:  04/30/2020  PATIENT: Adriana Samuels 1955                         MEDICAL RECORD #:  [id number]                                                       Reason for Visit: Adriana Samuels is a very pleasant 64 y.o. patient that has completed radiation therapy for carcinoma of the breast and returns to the clinic today for routine follow up exam.  Reports doing well, \"right shoulder feels tight sometimes\" since radiation to the right breast and axillary regions. Continues to follow Dr. Lawrence, next appt in November and Dr. Linton will be in June.     HISTORY OF PRESENT ILLNESS  Diagnosed in September 2018 withStage IIA (T1b, cN1a, cM0, G2) Invasive Ductal Carcinoma, ER/NC+ 1/2 SNB positive for metastatic carcinoma. Treated with neoadjuvant chemotherapy under the direction of Dr. Linton. Underwent right breast lumpectomy and sentinel lymph node resection on 9/10/2018. Pathology report states the residual tumor measures 9 mm, Margins negative, 2 mm from the closest, anterior, margin. 1/2 sentinel lymph nodes positive for metastatic disease, 2.5 mm  Negative for extra capsular extension.  Treated with adjuvant radiation therapy to right breast and axillary nodes with completion on 02/25/2019. Declined Femara. Follows Dr. Linton.    Bilateral mammogram/ right breast US:  • revealed a lesion that was 5 x 6 mm at the 10:00 position noted in the right breast by US. This is very suspicious for a malignancy.     09/05/2018 - Appointment with :  • Rather than obtaining an US biopsy the patient would like to proceed with right breast lumpectomy   • Proceed with excision of the overlying skin and the underlying mass with a sentinel lymph node " evaluation.     09/10/2018 - Breast, right, lumpectomy and sentinel lymph node resection:  • Invasive ductal carcinoma, intermediate grade  • Tumor measures 9 mm in greatest linear dimension  • Margins of resection are negative  • Tumor measures 2 mm from the closest, anterior, margin.  • Positive for metastatic ductal carcinoma in one out of 2 sentinel lymph nodes (1/2)  • The largest metastatic focus is 2.5 mm  • Negative for extra capsular extension  • %, AR 86%, HER2 -negative     09/18/2018 -Documentation per :  • Dr. Linton called about this patient today.  We have an appointment to see her later this week to advise us of his recommendations.  She had a 9 mm breast cancer which is invasive ductal carcinoma with 1 of 2 sentinel lymph nodes positive for metastatic disease.  • Therefore she will be treated with adjuvant chemotherapy prior to any radiation.  Due to the positive lymph node I will anticipate treating the breast and regional lymph nodes over 6 weeks.  However we will defer radiation until completion of chemotherapy.    09/20/2018 - Consult with :  • Patient will be initiating chemotherapy on 09/25/2018 under the care of . She will return for further treatment recommendations regarding radiation therapy after completion of chemotherapy.     09/21/2018 - CT Chest with contrast:  • Post therapy changes right breast and axilla.  • Linear nodularity left lung base, suspect chronic changes.  • Otherwise, No CT evidence of definite metastatic disease in chest or acute cardiopulmonary process.    09/21/2018 - CT Abdomen/Pelvis with contrast:  • Multiple low-attenuation hepatic lesions, hepatic cyst most likely.  • Suspect uterine fibroid  • Otherwise, No CT evidence of metastatic disease or acute intra-abdominal/pelvic pathological process.    09/21/2018 - Bone Scan:  • Focal increased tracer activity identified in the left lateral mid cervical spine, suspect degenerative  changes. Plain radiographic imaging could further characterize.  • Otherwise, No scintigraphic evidence of osseous metastases.    09/25/2018 - 11/27/2018 - Chemotherapy course:  • Taxotere/Cytoxan - 4 cycles    09/26/2018 - US Liver:  • Multiple hepatic cysts.    12/11/2018 - Appointment with :  • Referral to  for adjuvant radiation   • Start Femara after radiation therapy.   • Follow up in 4 months     12/31/2018 - Appointment with :  • CT Simulation completed for Stage IIA (T1b, cN1a, cM0, G2) Invasive Ductal Carcinoma, ER/IL+ 1/2 SNB positive for metastatic carcinoma with plans to begin radiation therapy after treatment planning.   • Return for initiation of treatment when plan completed.     01/15/2019 - 02/25/2019 - Completed radiation course:  • Received 6000 cGy in 30 fractions to right breast, right supraclavicular area, and right axilla via external beam radiation therapy.    10/28/2019 - Bilateral Diagnostic Mammogram:  • Posttherapeutic changes of the right breast are stable over a one year time period and no suspicious findings are identified. The patient is due to return in 12 months for bilateral digital mammograms.   • BI-RADS Category 2, benign.    10/31/2019 - Appointment with :  • Return to Dr. Williamson in 6 months  • Follow with Dr. Lawrence and Dr. Linton as scheduled    *At time of follow-up today, 04/30/2020, patient denies suspicious findings on self examinations, lymphadenopathy, unexplained weight loss, unexplained bony pain, unexplained headaches/nausea/vomiting/mental status changes.  She is due for her annual mammograms in October and already is scheduled.    History obtained from  PATIENT, FAMILY and CHART    PAST MEDICAL HISTORY  Past Medical History:   Diagnosis Date   • Breast cancer (CMS/HCC) 09/2018    right breast   • Cancer (CMS/HCC)    • Drug therapy 09/2018-11/2018   • Herpes zoster    • Hx of radiation therapy 02/2019    right breast   • Menorrhagia     • Mononucleosis    • PONV (postoperative nausea and vomiting)    • Shingles       PAST SURGICAL HISTORY  Past Surgical History:   Procedure Laterality Date   • BREAST BIOPSY Right 09/2018    positive   • BREAST LUMPECTOMY Right 09/2018    lumpectomy with chemo and rad tx   • BREAST LUMPECTOMY WITH SENTINEL NODE BIOPSY Right 9/10/2018    Procedure: RIGHT BREAST LUMPECTOMY WITH SENTINEL LYMPH NODE BIOPSY, INJECTION AND SCAN;  Surgeon: Duglas Lawrence MD;  Location: [facility] OR;  Service: General   • COLONOSCOPY N/A 6/6/2019    Procedure: COLONOSCOPY WITH ANESTHESIA;  Surgeon: Brock Raman MD;  Location: [facility] ENDOSCOPY;  Service: Gastroenterology   • HYDROCELE EXCISION / REPAIR     • LASER ABLATION OF THE CERVIX     • WRIST FUSION        FAMILY HISTORY  family history includes Skin cancer in her father.     SOCIAL HISTORY  Social History     Tobacco Use   • Smoking status: Never Smoker   • Smokeless tobacco: Never Used   Substance Use Topics   • Alcohol use: Yes     Comment: RARELY   • Drug use: No      ALLERGIES  Bactrim [sulfamethoxazole-trimethoprim]; Cortisone; Decadron [dexamethasone]; Hydrocortisone; and Erythromycin     MEDICATIONS  Current Outpatient Medications   Medication Sig Dispense Refill   • aspirin 81 MG chewable tablet Chew 81 mg Daily.     • Calcium Carb-Cholecalciferol (CALCIUM 1000 + D PO) Take  by mouth Daily.     • glucosamine sulfate 500 MG capsule capsule Take  by mouth 3 (Three) Times a Day With Meals.     • Loratadine (CLARITIN PO) Take 10 mg by mouth Daily.     • Multiple Vitamins-Minerals (MULTIVITAMIN WITH MINERALS) tablet tablet Take 1 tablet by mouth Daily.     • Probiotic Product (PROBIOTIC PO) Take 1 capsule by mouth Daily.       No current facility-administered medications for this visit.       The following portions of the patient's history were reviewed and updated as appropriate: allergies, current medications, past family history, past medical history, past social  "history, past surgical history and problem list.    REVIEW OF SYSTEMS  Review of Systems   Constitutional: Negative.    HENT: Negative.         Sinus problems   Eyes: Negative.    Respiratory: Negative.    Cardiovascular: Negative.    Gastrointestinal: Negative.    Endocrine: Negative.    Musculoskeletal: Negative.    Skin: Negative.    Allergic/Immunologic: Negative.    Neurological: Negative.    Hematological: Negative.    Psychiatric/Behavioral: Negative.      PHYSICAL EXAM  VITAL SIGNS:   Vitals:    04/30/20 0859   BP: 124/75   Weight: 68 kg (150 lb)   Height: 167.6 cm (66\")   PainSc: 0-No pain      General Appearance:  awake, alert, oriented, in no acute distress, cooperative. Appears stated age.   Head: Normocephalic  Eyes: Conjunctiva pink, pupils equal and reactive.   Ears:  Normal externally.  Hearing- normal to conversation  Nose/Sinuses:  Mucosa normal. No drainage or sinus tenderness.  Mouth/Throat:  Mucosa moist, no lesions; pharynx without erythema, edema or exudate.  Neck: Supple, no mass, non-tender  Back:  Symmetric, no curvature, ROM normal, no CVA tenderness   Lungs:  Normal expansion.  Clear to auscultation.  No rales, rhonchi, or wheezing.  Heart:  Heart sounds are normal.  Regular rate and rhythm without murmur, gallop or rub.  Abdomen:  Soft, non-tender, normal bowel sounds; no bruits, organomegaly or masses.  Breasts: right breast normal without mass, skin or nipple changes or axillary nodes, left breast normal without mass, skin or nipple changes or axillary nodes, surgical scars noted, risk and benefit of breast self-exam was discussed.  Extremities: Warm to touch, pink, with no edema. Pulses 2+ bilaterally  Musculoskeletal: strength and sensation grossly normal  Neurologic:  Alert and oriented, gait normal, non-focal exam  Psych exam: normal situational behavior   Skin:  Warm and moist. No suspicious lesions or rashes of concern     Performance Status: ECOG (0) Fully active, able to carry " on all predisease performance without restriction    Clinical Quality Measures  -Pain Documented by Standardized Tool, FPS Adriana Samuels reports a pain score of 0.  Given her pain assessment as noted, treatment options were discussed and the following options were decided upon as a follow-up plan to address the patient's pain: no pain, no plan given.   Pain Medications             aspirin 81 MG chewable tablet Chew 81 mg Daily.        -Advanced Care Planning Advance Care Planning    ACP discussion was held with the patient during this visit. Patient does not have an advance directive, information provided.    -Body Mass Index Screening and Follow-Up Plan  Patient's Body mass index is 24.21 kg/m². BMI is within normal parameters. No follow-up required..  -Tobacco Use: Screening and Cessation Intervention Social History    Tobacco Use      Smoking status: Never Smoker      Smokeless tobacco: Never Used    ASSESSMENT AND PLAN  1. Malignant neoplasm of upper-outer quadrant of right breast in female, estrogen receptor positive (CMS/HCC)    2. S/P lumpectomy, right breast    3. S/P lymph node biopsy    4. History of radiation therapy    5. Non-smoker      RECOMMENDATIONS:   Adriana Samuels is status post completion of radiation therapy to the breast and presents to our clinic today for routine follow up exam. Diagnosed in September 2018 withStage IIA (T1b, cN1a, cM0, G2) Invasive Ductal Carcinoma, ER/VT+ 1/2 SNB positive for metastatic carcinoma. Treated with neoadjuvant chemotherapy under the direction of Dr. Linton. Underwent right breast lumpectomy and sentinel lymph node resection on 9/10/2018. Pathology report states the residual tumor measures 9 mm, Margins negative, 2 mm from the closest, anterior, margin. 1/2 sentinel lymph nodes positive for metastatic disease, 2.5 mm, negative for extra capsular extension.  Treated with adjuvant radiation therapy to right breast and axillary nodes with completion on 02/25/2019.  Declined Femara. Follows Dr. Linton.    Last mammogram was on 10/28/2019, results were negative, next mammogram is scheduled for October 2020. There is no clinical evidence suggestive of local recurrence of cancer on exam at this time, right breast scar tissue noted, right supraclavicular/neck fibrosis noted, she has been exercising and it has improved but still with very slight limitations. Offered physical therapy evaluation, she does not think that is necessary but will let us know if this changes. We will schedule a routine follow up in 6 months or sooner if needed.  She is to follow-up with her other healthcare providers as per their scheduling.    Patient Instructions   Routine Mammogram is due October  Continue self breast exams and report any unusual findings.  Continue to follow with   Return to  in 6 months for routine evaluation or sooner if needed.       Todays appointment time was spent in counseling, coordination of care and surveillance related to patients diagnosis as well as radiation therapy possible and probable after effects.  I saw this patient in follow-up with Chiqui Cade PA-C while covering for Dr. Jonathan Williamson, radiation oncologist.  Tino Hughes MD  04/30/2020

## 2020-04-29 ENCOUNTER — HOSPITAL ENCOUNTER (OUTPATIENT)
Dept: RADIATION ONCOLOGY | Facility: HOSPITAL | Age: 65
Setting detail: RADIATION/ONCOLOGY SERIES
End: 2020-04-29

## 2020-04-30 ENCOUNTER — OFFICE VISIT (OUTPATIENT)
Dept: RADIATION ONCOLOGY | Facility: HOSPITAL | Age: 65
End: 2020-04-30

## 2020-04-30 VITALS
WEIGHT: 150 LBS | DIASTOLIC BLOOD PRESSURE: 75 MMHG | SYSTOLIC BLOOD PRESSURE: 124 MMHG | BODY MASS INDEX: 24.11 KG/M2 | HEIGHT: 66 IN

## 2020-04-30 DIAGNOSIS — C50.411 MALIGNANT NEOPLASM OF UPPER-OUTER QUADRANT OF RIGHT BREAST IN FEMALE, ESTROGEN RECEPTOR POSITIVE (HCC): Primary | ICD-10-CM

## 2020-04-30 DIAGNOSIS — Z17.0 MALIGNANT NEOPLASM OF UPPER-OUTER QUADRANT OF RIGHT BREAST IN FEMALE, ESTROGEN RECEPTOR POSITIVE (HCC): Primary | ICD-10-CM

## 2020-04-30 DIAGNOSIS — Z98.890 S/P LYMPH NODE BIOPSY: ICD-10-CM

## 2020-04-30 DIAGNOSIS — Z78.9 NON-SMOKER: ICD-10-CM

## 2020-04-30 DIAGNOSIS — Z98.890 S/P LUMPECTOMY, RIGHT BREAST: ICD-10-CM

## 2020-04-30 DIAGNOSIS — Z92.3 HISTORY OF RADIATION THERAPY: ICD-10-CM

## 2020-04-30 PROCEDURE — G0463 HOSPITAL OUTPT CLINIC VISIT: HCPCS | Performed by: RADIOLOGY

## 2020-04-30 NOTE — PATIENT INSTRUCTIONS
Routine Mammogram is due October  Continue self breast exams and report any unusual findings.  Continue to follow with   Return to  in 6 months for routine evaluation or sooner if needed.

## 2020-06-12 NOTE — PROGRESS NOTES
[facility]  [address]  [place] [address] [phone]    Patient Name: Adriana Samuels  Encounter Date: 06/19/2020  YOB: 1955  Patient Number: [id number]      REASON FOR FOLLOW-UP: Ms. Adriana Samuels is a pleasant 64-year-old  female, post menopausal, who is seen on followup for Stage IIA right breast cancer.  She is seen 18.75 months from 4 cycles of adjuvant Taxotere and Cytoxan.  She declined adjuvant Femara. She is seen alone.  She is a reliable historian.      Oncology/Hematology History    IAGNOSTIC ABNORMALITIES:   The patient had a mammogram from [facility] [place] that showed a lesion 5.6 mm at the 10 o'clock position also noted on ultrasound suspicious for malignancy. The patient was referred for nodule in the right upper outer breast.  The patient was seen by Dr. Lawrence 09/05/2018.  Examination showed small palpable abnormality at 10 and 11 o'clock positions, 0.5 cm to 1 cm in size. No adenopathy noted. Patient planned for excision and sentinel node evaluation.  Pathology report 09/11/2018: Invasive ductal carcinoma intermediate grade, 1 out of 2 sentinel lymph nodes positive for metastasis. The largest metastatic focus is 2.5 mm. Negative for extracapsular extension. Tumor measures 9 mm in greatest linear dimension. Margins of resection are negative. Tumor measures 2 mm from closest anterior margin. AJCC T1b, N1a. ER/HI positive and HER-2/gregory negative.  CT abdomen and pelvis performed at [facility] on 09/21/2018 was revealing for IMPRESSION: 1. Post therapy changes right breast and axilla. 2. Linear nodularity left lung base, suspect chronic changes. 3. Otherwise, No CT evidence of definite metastatic disease in chest or acute cardiopulmonary process.   Bone scan performed at [facility] on 09/21/2018 was revealing for IMPRESSION: 1. Focal increased tracer activity identified in  the left lateral mid-cervical spine, suspect degenerative changes. Plain radiographic imaging could further characterize. 2. Otherwise, No scintigraphic evidence of osseous metastases.   Ultrasound of liver performed at [facility] on 09/26/2018 was revealing for Summary:  Multiple hepatic cysts.      PREVIOUS INTERVENTIONS:   The patient had undergone right breast lumpectomy with sentinel lymph node biopsy 09/10/2018 by Dr. Lawrence.  Adjuvant Taxotere and Cytoxan 09/25/2018 through 11/27/2018 at the [facility]. 4 cycles.   Adjuvant radiation end 02/2019 by Dr. Williamson.  Adjuvant Femara 2.5 mg po daily, she declined.               Malignant neoplasm of upper-outer quadrant of right female breast (CMS/HCC)     Initial Diagnosis     Malignant neoplasm of right female breast (CMS/HCC)      9/10/2018 Surgery     Final Diagnosis   1.  Breast, right, lumpectomy and sentinel lymph node resection:  Invasive ductal carcinoma, intermediate grade  Tumor measures 9 mm in greatest linear dimension  Margins of resection are negative  Tumor measures 2 mm from the closest, anterior, margin.     Positive for metastatic ductal carcinoma in one out of 2 sentinel lymph nodes (1/2)  The largest metastatic focus is 2.5 mm  Negative for extra capsular extension     AJCC cancer stage: pT1b, (sn)pN1a             9/21/2018 Imaging     Ct Chest With Contrast    Result Date: 9/21/2018  1. Post therapy changes right breast and axilla. 2. Linear nodularity left lung base, suspect chronic changes. 3. Otherwise, No CT evidence of definite metastatic disease in chest or acute cardiopulmonary process. This report was finalized on 09/21/2018 13:03 by Dr. Molina Salazar MD.    Nm Bone Scan Whole Body    Result Date: 9/21/2018  1. Focal increased tracer activity identified in the left lateral mid cervical spine, suspect degenerative changes. Plain radiographic imaging could further characterize. 2. Otherwise, No scintigraphic evidence of osseous  metastases. This report was finalized on 09/21/2018 14:21 by Dr. Molina Salazar MD.    Ct Abdomen Pelvis With Contrast    Result Date: 9/21/2018  1. Multiple low-attenuation hepatic lesions, hepatic cyst most likely. 2. Suspect uterine fibroid 3. Otherwise, No CT evidence of metastatic disease or acute intra-abdominal/pelvic pathological process. This report was finalized on 09/21/2018 13:12 by Dr. Molina Salazar MD.        9/25/2018 - 11/27/2018 Chemotherapy     Taxotere Cytoxan      1/15/2019 - 2/25/2019 Radiation     Radiation OncologyTreatment Course:  Adriana Samuels received 6000 cGy in 30 fractions to right breast, right supraclavicular area, and right axilla via external beam radiation therapy.      10/28/2019 Imaging     Bilateral Diagnostic Mammogram:  IMPRESSION:  Posttherapeutic changes of the right breast are stable over  a one year time period and no suspicious findings are identified. The  patient is due to return in 12 months for bilateral digital mammograms.  BI-RADS Category 2, benign.         PAST MEDICAL HISTORY:  ALLERGIES:  Allergies   Allergen Reactions   • Bactrim [Sulfamethoxazole-Trimethoprim] Rash   • Cortisone Rash   • Decadron [Dexamethasone] Hives   • Hydrocortisone Hives     Oral or IV   • Erythromycin Nausea Only and Rash     CURRENT MEDICATIONS:  Outpatient Encounter Medications as of 6/19/2020   Medication Sig Dispense Refill   • aspirin 81 MG chewable tablet Chew 81 mg Daily.     • Calcium Carb-Cholecalciferol (CALCIUM 1000 + D PO) Take  by mouth Daily.     • glucosamine sulfate 500 MG capsule capsule Take  by mouth 3 (Three) Times a Day With Meals.     • Loratadine (CLARITIN PO) Take 10 mg by mouth Daily.     • Multiple Vitamins-Minerals (MULTIVITAMIN WITH MINERALS) tablet tablet Take 1 tablet by mouth Daily.     • Probiotic Product (PROBIOTIC PO) Take 1 capsule by mouth Daily.       No facility-administered encounter medications on file as of 6/19/2020.      ADULT ILLNESSES:  Patient  Active Problem List   Diagnosis Code   • Malignant neoplasm of upper-outer quadrant of right female breast (CMS/HCC) C50.411   • Non-smoker Z78.9   • S/P lumpectomy, right breast Z98.890   • S/P lymph node biopsy Z98.890   • History of radiation therapy Z92.3     SURGERIES:  Past Surgical History:   Procedure Laterality Date   • BREAST BIOPSY Right 09/2018    positive   • BREAST LUMPECTOMY Right 09/2018    lumpectomy with chemo and rad tx   • BREAST LUMPECTOMY WITH SENTINEL NODE BIOPSY Right 9/10/2018    Procedure: RIGHT BREAST LUMPECTOMY WITH SENTINEL LYMPH NODE BIOPSY, INJECTION AND SCAN;  Surgeon: Duglas Lawrence MD;  Location: [facility] OR;  Service: General   • COLONOSCOPY N/A 6/6/2019    Procedure: COLONOSCOPY WITH ANESTHESIA;  Surgeon: Brock Raman MD;  Location: [facility] ENDOSCOPY;  Service: Gastroenterology   • HYDROCELE EXCISION / REPAIR     • LASER ABLATION OF THE CERVIX     • WRIST FUSION       HEALTH MAINTENANCE ITEMS:  Health Maintenance Due   Topic Date Due   • TDAP/TD VACCINES (1 - Tdap) 08/10/1965   • ZOSTER VACCINE (1 of 2) 08/10/2004   • HEPATITIS C SCREENING  09/06/2018   • MEDICARE ANNUAL WELLNESS  09/06/2018   • Pneumococcal Vaccine Once at 65 Years Old  08/10/2019       <no information>  Last Completed Colonoscopy       Status Date      COLONOSCOPY Done 6/6/2019 Surg:COLONOSCOPY     Patient has more history with this topic...          There is no immunization history on file for this patient.  Last Completed Mammogram       Status Date      MAMMOGRAM Done 10/28/2019 MAMMO DIAGNOSTIC DIGITAL TOMOSYNTHESIS BILATERAL W CAD     Patient has more history with this topic...            FAMILY HISTORY:  Family History   Problem Relation Age of Onset   • Skin cancer Father    • Breast cancer Neg Hx    • Colon cancer Neg Hx    • Colon polyps Neg Hx      SOCIAL HISTORY:  Social History     Socioeconomic History   • Marital status:      Spouse name: Not on file   • Number of children: Not on file  "  • Years of education: Not on file   • Highest education level: Not on file   Tobacco Use   • Smoking status: Never Smoker   • Smokeless tobacco: Never Used   Substance and Sexual Activity   • Alcohol use: Yes     Comment: RARELY   • Drug use: No   • Sexual activity: Defer       REVIEW OF SYSTEMS:    Review of Systems   Constitutional: Negative for chills, diaphoresis, fatigue and fever.        \"I feel good.  I walk 45 minutes a day.\"   HENT: Negative for congestion, hearing loss and trouble swallowing.    Eyes: Negative for redness.   Respiratory: Negative for cough, shortness of breath and wheezing.    Cardiovascular: Negative for chest pain and leg swelling.   Gastrointestinal: Negative for abdominal pain, blood in stool, constipation, diarrhea, nausea and vomiting.   Endocrine: Negative for cold intolerance and heat intolerance.   Genitourinary: Negative for difficulty urinating, flank pain, hematuria and vaginal bleeding.   Musculoskeletal: Negative for gait problem and neck stiffness.   Skin: Negative for pallor.   Allergic/Immunologic: Negative for food allergies.   Neurological: Negative for dizziness, speech difficulty, weakness and light-headedness.   Hematological: Negative for adenopathy. Does not bruise/bleed easily.   Psychiatric/Behavioral: Negative for agitation, confusion and hallucinations. The patient is not nervous/anxious.        VITAL SIGNS: /68   Pulse 92   Temp 97.7 °F (36.5 °C)   Resp 16   Ht 167.6 cm (66\")   Wt 67.9 kg (149 lb 11.2 oz)   SpO2 97%   Breastfeeding No   BMI 24.16 kg/m²   Pain Score    06/19/20 0916   PainSc: 0-No pain       PHYSICAL EXAMINATION:     Physical Exam   Constitutional: She appears well-developed and well-nourished. No distress.   HENT:   Head: Normocephalic and atraumatic.   Eyes: No scleral icterus.   Neck: Neck supple.   Cardiovascular: Normal rate and regular rhythm.   Pulmonary/Chest: Effort normal and breath sounds normal. No stridor. She has no " wheezes.   Lumpectomy scar, right. No mass palpable.  Examined with Karen.    Abdominal: Soft. Bowel sounds are normal. There is no tenderness.   Musculoskeletal: She exhibits no edema.   Lymphadenopathy:     She has no cervical adenopathy.   Neurological: She is alert.   Skin: Skin is warm and dry. She is not diaphoretic. No pallor.   Psychiatric: She has a normal mood and affect. Her behavior is normal. Judgment and thought content normal.   Vitals reviewed.      LABS    Lab Results - Last 18 Months   Lab Units 04/10/20  0916 12/03/19  0800 07/30/19  0758 04/02/19  0852   HEMOGLOBIN g/dL 13.6 14.3 13.8 14.1   HEMATOCRIT % 42.7 43.3 42.4 42.9   MCV fL 87.1 85.6 87.8 83.0   WBC 10*3/mm3 3.50 3.78 3.74* 3.90*   RDW % 13.2 12.8 13.2 12.5   MPV fL 10.0 9.3 9.5 9.5   PLATELETS 10*3/mm3 217 218 198 208   IMM GRAN % % 0.0 0.3 0.3 0.3   NEUTROS ABS 10*3/mm3 2.01 2.33 2.45 2.63   LYMPHS ABS 10*3/mm3 0.90 0.87 0.72 0.62*   MONOS ABS 10*3/mm3 0.42 0.42 0.38 0.48   EOS ABS 10*3/mm3 0.11 0.10 0.16 0.13   BASOS ABS 10*3/mm3 0.06 0.05 0.02 0.03   IMMATURE GRANS (ABS) 10*3/mm3 0.00 0.01 0.01 0.01   NRBC /100 WBC 0.0 0.0 0.0 0.0       Lab Results - Last 18 Months   Lab Units 04/10/20  0916 12/03/19  0800 07/30/19  0758 04/02/19  0852   GLUCOSE mg/dL 92 88 68* 96   SODIUM mmol/L 143 143 143 142   POTASSIUM mmol/L 4.5 4.1 4.4 4.6   CO2 mmol/L 29.0 30.0* 31.0 32.0*   CHLORIDE mmol/L 104 103 106 101   ANION GAP mmol/L 10.0 10.0 6.0 9.0   CREATININE mg/dL 0.62 0.64 0.71 0.62   BUN mg/dL 17 16 15 21   BUN / CREAT RATIO  27.4* 25.0 21.1 33.9*   CALCIUM mg/dL 9.5 9.6 9.3 9.8   EGFR IF NONAFRICN AM mL/min/1.73 97 93 83 97   ALK PHOS U/L 78 83 75 74   TOTAL PROTEIN g/dL 7.0 7.5 7.2 7.3   ALT (SGPT) U/L 17 11 18 <15   AST (SGOT) U/L 19 15 26 24   BILIRUBIN mg/dL 0.5 0.5 0.7 0.7   ALBUMIN g/dL 4.50 4.70 4.20 4.20   GLOBULIN gm/dL 2.5 2.8 3.0 3.1       Lab Results - Last 18 Months   Lab Units 04/10/20  0916 12/03/19  0800 07/30/19  0758  "04/02/19  0852 01/08/19  0900   CEA ng/mL 2.08 2.18 2.27 1.83 1.12       No results for input(s): IRON, TIBC, LABIRON, FERRITIN, T4VRCIB, TSH, FOLATE in the last 56468 hours.    Invalid input(s): VITB12    Adriana Samuels reports a pain score of 0.        Patient's Body mass index is 24.16 kg/m². BMI is within normal parameters. No follow-up required..      ASSESSMENT:  1.    Carcinoma of the right upper outer breast. Invasive ductal carcinoma:  Current Stage: AJCC IIA (pT1b, pN1a, M0, G2)   Tumor size 9 mm.  Hormone Status: %, NC 86%, and negative HER-2/gregory.  Baseline Node Status: 1/2 sentinel node positive for metastasis.   Complications of Tumor: None.   Treatment status: Post adjuvant Taxotere and Cytoxan.   Post adjuvant radiation 02/2019.     Prognosis: Fair.  2.    Performance status of 0.   3.    Self directed care.   4.    Osteopenia. On Caltrate D.  5.    Internal hemorrhoids.   6.    Elevated CEA, history of.    7.    Rash from Decadron.  8.    Grade 1 nausea from chemo, resolved.  9.    Grade 1 fatigue from chemo, resolved.         PLAN:  1.     Re:  Note from Dr. Hughes 04/30/2020.   No signs of recurrence.  2.     Re:  Heme status.  Hemoglobin 13.6.  3.     Re:  Pre-office CMP.  GFR 97 ml/minute.   4.     Re:  Pre-office CEA.  Normal at 2.08.  5.     Re:  Pre-office CA27-29.  Normal at 14.5   6.     Re:  Start adjuvant Femara. Patient declined.  \"No. I don't see a reason to take it. It's hard on your bone.\"   7.    eRx for Femara 2.5 mg p.o. daily, 60, 3 refills. She declined.  Observe for adverse events.  Aware of potential hot flashes, bone loss, weight gain, and thrombosis.   8.   Continue ongoing management per primary care physician and other specialists.  9.    Continue currently identified medications.  10.  Plan of care discussed with patient.  Understanding expressed.  Patient is agreeable to proceed.  11.  She will be seen every 4 months for the " first 2 years then every 6 months for the next 3 years.  Annual mammogram.   12.  Schedule mammogram 10/20/2020 at [facility].  13.  Return to the office in 4 months with pre-office CBC with differential, CMP, CA27-29, and CEA.         I spent 28 total minutes, face-to-face, caring for Adriana today.  Greater than 50% of this time involved counseling and/or coordination of care as documented within this note regarding the patient's illness(es), pros and cons of various treatment options, instructions and/or risk reduction.          cc: MD Duglas Jean Baptiste MD         (Jonathan Williamson MD)

## 2020-06-19 ENCOUNTER — OFFICE VISIT (OUTPATIENT)
Dept: ONCOLOGY | Facility: CLINIC | Age: 65
End: 2020-06-19

## 2020-06-19 VITALS
OXYGEN SATURATION: 97 % | RESPIRATION RATE: 16 BRPM | DIASTOLIC BLOOD PRESSURE: 68 MMHG | TEMPERATURE: 97.7 F | HEART RATE: 92 BPM | BODY MASS INDEX: 24.06 KG/M2 | HEIGHT: 66 IN | WEIGHT: 149.7 LBS | SYSTOLIC BLOOD PRESSURE: 110 MMHG

## 2020-06-19 DIAGNOSIS — C50.411 MALIGNANT NEOPLASM OF UPPER-OUTER QUADRANT OF RIGHT BREAST IN FEMALE, ESTROGEN RECEPTOR POSITIVE (HCC): Primary | ICD-10-CM

## 2020-06-19 DIAGNOSIS — Z17.0 MALIGNANT NEOPLASM OF UPPER-OUTER QUADRANT OF RIGHT BREAST IN FEMALE, ESTROGEN RECEPTOR POSITIVE (HCC): Primary | ICD-10-CM

## 2020-06-19 PROCEDURE — 99214 OFFICE O/P EST MOD 30 MIN: CPT | Performed by: INTERNAL MEDICINE

## 2020-10-09 ENCOUNTER — LAB (OUTPATIENT)
Dept: LAB | Facility: HOSPITAL | Age: 65
End: 2020-10-09

## 2020-10-09 DIAGNOSIS — C50.411 MALIGNANT NEOPLASM OF UPPER-OUTER QUADRANT OF RIGHT BREAST IN FEMALE, ESTROGEN RECEPTOR POSITIVE (HCC): ICD-10-CM

## 2020-10-09 DIAGNOSIS — Z17.0 MALIGNANT NEOPLASM OF UPPER-OUTER QUADRANT OF RIGHT BREAST IN FEMALE, ESTROGEN RECEPTOR POSITIVE (HCC): ICD-10-CM

## 2020-10-09 LAB
ALBUMIN SERPL-MCNC: 4.3 G/DL (ref 3.5–5.2)
ALBUMIN/GLOB SERPL: 1.8 G/DL
ALP SERPL-CCNC: 78 U/L (ref 39–117)
ALT SERPL W P-5'-P-CCNC: 10 U/L (ref 1–33)
ANION GAP SERPL CALCULATED.3IONS-SCNC: 7 MMOL/L (ref 5–15)
AST SERPL-CCNC: 18 U/L (ref 1–32)
BASOPHILS # BLD AUTO: 0.03 10*3/MM3 (ref 0–0.2)
BASOPHILS NFR BLD AUTO: 0.8 % (ref 0–1.5)
BILIRUB SERPL-MCNC: 0.4 MG/DL (ref 0–1.2)
BUN SERPL-MCNC: 14 MG/DL (ref 8–23)
BUN/CREAT SERPL: 22.2 (ref 7–25)
CALCIUM SPEC-SCNC: 9.3 MG/DL (ref 8.6–10.5)
CEA SERPL-MCNC: 2.03 NG/ML
CHLORIDE SERPL-SCNC: 105 MMOL/L (ref 98–107)
CO2 SERPL-SCNC: 31 MMOL/L (ref 22–29)
CREAT SERPL-MCNC: 0.63 MG/DL (ref 0.57–1)
DEPRECATED RDW RBC AUTO: 41.5 FL (ref 37–54)
EOSINOPHIL # BLD AUTO: 0.13 10*3/MM3 (ref 0–0.4)
EOSINOPHIL NFR BLD AUTO: 3.5 % (ref 0.3–6.2)
ERYTHROCYTE [DISTWIDTH] IN BLOOD BY AUTOMATED COUNT: 13.2 % (ref 12.3–15.4)
GFR SERPL CREATININE-BSD FRML MDRD: 95 ML/MIN/1.73
GLOBULIN UR ELPH-MCNC: 2.4 GM/DL
GLUCOSE SERPL-MCNC: 85 MG/DL (ref 65–99)
HCT VFR BLD AUTO: 42.2 % (ref 34–46.6)
HGB BLD-MCNC: 13.6 G/DL (ref 12–15.9)
IMM GRANULOCYTES # BLD AUTO: 0 10*3/MM3 (ref 0–0.05)
IMM GRANULOCYTES NFR BLD AUTO: 0 % (ref 0–0.5)
LYMPHOCYTES # BLD AUTO: 1 10*3/MM3 (ref 0.7–3.1)
LYMPHOCYTES NFR BLD AUTO: 27 % (ref 19.6–45.3)
MCH RBC QN AUTO: 27.9 PG (ref 26.6–33)
MCHC RBC AUTO-ENTMCNC: 32.2 G/DL (ref 31.5–35.7)
MCV RBC AUTO: 86.5 FL (ref 79–97)
MONOCYTES # BLD AUTO: 0.36 10*3/MM3 (ref 0.1–0.9)
MONOCYTES NFR BLD AUTO: 9.7 % (ref 5–12)
NEUTROPHILS NFR BLD AUTO: 2.18 10*3/MM3 (ref 1.7–7)
NEUTROPHILS NFR BLD AUTO: 59 % (ref 42.7–76)
NRBC BLD AUTO-RTO: 0 /100 WBC (ref 0–0.2)
PLATELET # BLD AUTO: 203 10*3/MM3 (ref 140–450)
PMV BLD AUTO: 9.5 FL (ref 6–12)
POTASSIUM SERPL-SCNC: 4.3 MMOL/L (ref 3.5–5.2)
PROT SERPL-MCNC: 6.7 G/DL (ref 6–8.5)
RBC # BLD AUTO: 4.88 10*6/MM3 (ref 3.77–5.28)
SODIUM SERPL-SCNC: 143 MMOL/L (ref 136–145)
WBC # BLD AUTO: 3.7 10*3/MM3 (ref 3.4–10.8)

## 2020-10-09 PROCEDURE — 36415 COLL VENOUS BLD VENIPUNCTURE: CPT

## 2020-10-09 PROCEDURE — 82378 CARCINOEMBRYONIC ANTIGEN: CPT

## 2020-10-09 PROCEDURE — 86300 IMMUNOASSAY TUMOR CA 15-3: CPT

## 2020-10-09 PROCEDURE — 80053 COMPREHEN METABOLIC PANEL: CPT

## 2020-10-09 PROCEDURE — 85025 COMPLETE CBC W/AUTO DIFF WBC: CPT

## 2020-10-10 LAB — CANCER AG27-29 SERPL-ACNC: 18.2 U/ML (ref 0–38.6)

## 2020-10-10 NOTE — PROGRESS NOTES
[facility]  [address]  [place] [address] [phone]    Patient Name: Adriana Samuels  Encounter Date: 10/16/2020  YOB: 1955  Patient Number: [id number]      REASON FOR FOLLOW-UP: Ms. Adriana Samuels is a pleasant 65-year-old  female, post menopausal, who is seen on followup for Stage IIA right breast cancer.  She is seen 23 months from 4 cycles of adjuvant Taxotere and Cytoxan.  She declined adjuvant Femara.  She is seen alone.  She is a reliable historian.      Oncology/Hematology History Overview Note   IAGNOSTIC ABNORMALITIES:   The patient had a mammogram from [facility] that showed a lesion 5.6 mm at the 10 o'clock position also noted on ultrasound suspicious for malignancy. The patient was referred for nodule in the right upper outer breast.  The patient was seen by Dr. Lawrence 09/05/2018.  Examination showed small palpable abnormality at 10 and 11 o'clock positions, 0.5 cm to 1 cm in size. No adenopathy noted. Patient planned for excision and sentinel node evaluation.  Pathology report 09/11/2018: Invasive ductal carcinoma intermediate grade, 1 out of 2 sentinel lymph nodes positive for metastasis. The largest metastatic focus is 2.5 mm. Negative for extracapsular extension. Tumor measures 9 mm in greatest linear dimension. Margins of resection are negative. Tumor measures 2 mm from closest anterior margin. AJCC T1b, N1a. ER/MO positive and HER-2/gregory negative.  CT abdomen and pelvis performed at [facility] on 09/21/2018 was revealing for IMPRESSION: 1. Post therapy changes right breast and axilla. 2. Linear nodularity left lung base, suspect chronic changes. 3. Otherwise, No CT evidence of definite metastatic disease in chest or acute cardiopulmonary process.   Bone scan performed at [facility] on 09/21/2018 was revealing for IMPRESSION: 1. Focal increased tracer activity  identified in the left lateral mid-cervical spine, suspect degenerative changes. Plain radiographic imaging could further characterize. 2. Otherwise, No scintigraphic evidence of osseous metastases.   Ultrasound of liver performed at [facility] on 09/26/2018 was revealing for Summary:  Multiple hepatic cysts.      PREVIOUS INTERVENTIONS:   The patient had undergone right breast lumpectomy with sentinel lymph node biopsy 09/10/2018 by Dr. Lawrence.  Adjuvant Taxotere and Cytoxan 09/25/2018 through 11/27/2018 at the [facility]. 4 cycles.   Adjuvant radiation completed 02/2019 by Dr. Williamson.  Adjuvant Femara 2.5 mg po daily, she declined.            Malignant neoplasm of upper-outer quadrant of right female breast (CMS/HCC)    Initial Diagnosis    Malignant neoplasm of right female breast (CMS/HCC)     9/10/2018 Surgery    Final Diagnosis   1.  Breast, right, lumpectomy and sentinel lymph node resection:  Invasive ductal carcinoma, intermediate grade  Tumor measures 9 mm in greatest linear dimension  Margins of resection are negative  Tumor measures 2 mm from the closest, anterior, margin.     Positive for metastatic ductal carcinoma in one out of 2 sentinel lymph nodes (1/2)  The largest metastatic focus is 2.5 mm  Negative for extra capsular extension     AJCC cancer stage: pT1b, (sn)pN1a            9/21/2018 Imaging    Ct Chest With Contrast    Result Date: 9/21/2018  1. Post therapy changes right breast and axilla. 2. Linear nodularity left lung base, suspect chronic changes. 3. Otherwise, No CT evidence of definite metastatic disease in chest or acute cardiopulmonary process. This report was finalized on 09/21/2018 13:03 by Dr. Molina Salazar MD.    Nm Bone Scan Whole Body    Result Date: 9/21/2018  1. Focal increased tracer activity identified in the left lateral mid cervical spine, suspect degenerative changes. Plain radiographic imaging could further characterize. 2. Otherwise, No scintigraphic evidence  of osseous metastases. This report was finalized on 09/21/2018 14:21 by Dr. Molina Salazar MD.    Ct Abdomen Pelvis With Contrast    Result Date: 9/21/2018  1. Multiple low-attenuation hepatic lesions, hepatic cyst most likely. 2. Suspect uterine fibroid 3. Otherwise, No CT evidence of metastatic disease or acute intra-abdominal/pelvic pathological process. This report was finalized on 09/21/2018 13:12 by Dr. Molina Salazar MD.       9/25/2018 - 11/27/2018 Chemotherapy    Taxotere Cytoxan     1/15/2019 - 2/25/2019 Radiation    Radiation OncologyTreatment Course:  Adriana Samuels received 6000 cGy in 30 fractions to right breast, right supraclavicular area, and right axilla via external beam radiation therapy.     10/28/2019 Imaging    Bilateral Diagnostic Mammogram:  IMPRESSION:  Posttherapeutic changes of the right breast are stable over  a one year time period and no suspicious findings are identified. The  patient is due to return in 12 months for bilateral digital mammograms.  BI-RADS Category 2, benign.         PAST MEDICAL HISTORY:  ALLERGIES:  Allergies   Allergen Reactions   • Bactrim [Sulfamethoxazole-Trimethoprim] Rash   • Cortisone Rash   • Decadron [Dexamethasone] Hives   • Hydrocortisone Hives     Oral or IV   • Erythromycin Nausea Only and Rash     CURRENT MEDICATIONS:  Outpatient Encounter Medications as of 10/16/2020   Medication Sig Dispense Refill   • aspirin 81 MG chewable tablet Chew 81 mg Daily.     • Biotin 1 MG capsule Take  by mouth.     • Calcium Carb-Cholecalciferol (CALCIUM 1000 + D PO) Take  by mouth Daily.     • glucosamine sulfate 500 MG capsule capsule Take  by mouth 3 (Three) Times a Day With Meals.     • Loratadine (CLARITIN PO) Take 10 mg by mouth Daily.     • Multiple Vitamins-Minerals (MULTIVITAMIN WITH MINERALS) tablet tablet Take 1 tablet by mouth Daily.     • Probiotic Product (PROBIOTIC PO) Take 1 capsule by mouth Daily.       No facility-administered encounter medications on file  as of 10/16/2020.      ADULT ILLNESSES:  Patient Active Problem List   Diagnosis Code   • Malignant neoplasm of upper-outer quadrant of right female breast (CMS/HCC) C50.411   • Non-smoker Z78.9   • S/P lumpectomy, right breast Z98.890   • S/P lymph node biopsy Z98.890   • History of radiation therapy Z92.3     SURGERIES:  Past Surgical History:   Procedure Laterality Date   • BREAST BIOPSY Right 09/2018    positive   • BREAST LUMPECTOMY Right 09/2018    lumpectomy with chemo and rad tx   • BREAST LUMPECTOMY WITH SENTINEL NODE BIOPSY Right 9/10/2018    Procedure: RIGHT BREAST LUMPECTOMY WITH SENTINEL LYMPH NODE BIOPSY, INJECTION AND SCAN;  Surgeon: Duglas Lawrence MD;  Location: [facility] OR;  Service: General   • COLONOSCOPY N/A 6/6/2019    Procedure: COLONOSCOPY WITH ANESTHESIA;  Surgeon: Brock Raman MD;  Location: [facility] ENDOSCOPY;  Service: Gastroenterology   • HYDROCELE EXCISION / REPAIR     • LASER ABLATION OF THE CERVIX     • WRIST FUSION       HEALTH MAINTENANCE ITEMS:  Health Maintenance Due   Topic Date Due   • TDAP/TD VACCINES (1 - Tdap) 08/10/1973   • ZOSTER VACCINE (1 of 2) 08/10/2004   • HEPATITIS C SCREENING  09/06/2018   • ANNUAL WELLNESS VISIT  09/06/2018   • Pneumococcal Vaccine 65+ (1 of 1 - PPSV23) 08/10/2019   • INFLUENZA VACCINE  08/01/2020       <no information>  Last Completed Colonoscopy       Status Date      COLONOSCOPY Done 6/6/2019 Surg:COLONOSCOPY     Patient has more history with this topic...          There is no immunization history on file for this patient.  Last Completed Mammogram       Status Date      MAMMOGRAM Done 10/28/2019 MAMMO DIAGNOSTIC DIGITAL TOMOSYNTHESIS BILATERAL W CAD     Patient has more history with this topic...            FAMILY HISTORY:  Family History   Problem Relation Age of Onset   • Skin cancer Father    • Breast cancer Neg Hx    • Colon cancer Neg Hx    • Colon polyps Neg Hx      SOCIAL HISTORY:  Social History     Socioeconomic History   • Marital  "status:      Spouse name: Not on file   • Number of children: Not on file   • Years of education: Not on file   • Highest education level: Not on file   Tobacco Use   • Smoking status: Never Smoker   • Smokeless tobacco: Never Used   Substance and Sexual Activity   • Alcohol use: Yes     Comment: RARELY   • Drug use: No   • Sexual activity: Defer       REVIEW OF SYSTEMS:    Review of Systems   Constitutional: Negative for chills, diaphoresis, fatigue, fever and unexpected weight change.        \"I feel great.\"   HENT: Negative for congestion, hearing loss and trouble swallowing.    Eyes: Negative for redness and visual disturbance.   Respiratory: Negative for cough, shortness of breath and wheezing.    Cardiovascular: Negative for chest pain and palpitations.   Gastrointestinal: Negative for abdominal pain, constipation, diarrhea, nausea and vomiting.   Endocrine: Negative for cold intolerance and heat intolerance.   Genitourinary: Negative for difficulty urinating, flank pain and hematuria.   Musculoskeletal: Negative for gait problem and joint swelling.   Skin: Negative for pallor.   Allergic/Immunologic: Negative for food allergies.   Neurological: Negative for dizziness, speech difficulty and weakness.   Hematological: Negative for adenopathy. Does not bruise/bleed easily.   Psychiatric/Behavioral: Negative for agitation, confusion and hallucinations.       VITAL SIGNS: /68   Pulse 103   Temp 98.5 °F (36.9 °C)   Resp 18   Ht 167.6 cm (66\")   Wt 68 kg (150 lb)   SpO2 99%   Breastfeeding No   BMI 24.21 kg/m²   Pain Score    10/16/20 0815   PainSc: 0-No pain       PHYSICAL EXAMINATION:     Physical Exam  Vitals signs reviewed.   Constitutional:       Appearance: Normal appearance. She is not ill-appearing or diaphoretic.   HENT:      Head: Normocephalic and atraumatic.   Eyes:      General: No scleral icterus.  Neck:      Musculoskeletal: Neck supple.   Cardiovascular:      Rate and Rhythm: " Normal rate and regular rhythm.   Pulmonary:      Effort: No respiratory distress.      Breath sounds: No wheezing or rales.   Abdominal:      General: Abdomen is flat. Bowel sounds are normal.      Palpations: Abdomen is soft.      Tenderness: There is no abdominal tenderness.   Musculoskeletal:         General: No swelling.   Skin:     General: Skin is dry.      Coloration: Skin is not pale.   Neurological:      Mental Status: She is alert and oriented to person, place, and time.   Psychiatric:         Mood and Affect: Mood normal.         Behavior: Behavior normal.         Thought Content: Thought content normal.         Judgment: Judgment normal.         LABS    Lab Results - Last 18 Months   Lab Units 10/09/20  0813 04/10/20  0916 12/03/19  0800 07/30/19  0758   HEMOGLOBIN g/dL 13.6 13.6 14.3 13.8   HEMATOCRIT % 42.2 42.7 43.3 42.4   MCV fL 86.5 87.1 85.6 87.8   WBC 10*3/mm3 3.70 3.50 3.78 3.74*   RDW % 13.2 13.2 12.8 13.2   MPV fL 9.5 10.0 9.3 9.5   PLATELETS 10*3/mm3 203 217 218 198   IMM GRAN % % 0.0 0.0 0.3 0.3   NEUTROS ABS 10*3/mm3 2.18 2.01 2.33 2.45   LYMPHS ABS 10*3/mm3 1.00 0.90 0.87 0.72   MONOS ABS 10*3/mm3 0.36 0.42 0.42 0.38   EOS ABS 10*3/mm3 0.13 0.11 0.10 0.16   BASOS ABS 10*3/mm3 0.03 0.06 0.05 0.02   IMMATURE GRANS (ABS) 10*3/mm3 0.00 0.00 0.01 0.01   NRBC /100 WBC 0.0 0.0 0.0 0.0       Lab Results - Last 18 Months   Lab Units 10/09/20  0813 04/10/20  0916 12/03/19  0800 07/30/19  0758   GLUCOSE mg/dL 85 92 88 68*   SODIUM mmol/L 143 143 143 143   POTASSIUM mmol/L 4.3 4.5 4.1 4.4   CO2 mmol/L 31.0* 29.0 30.0* 31.0   CHLORIDE mmol/L 105 104 103 106   ANION GAP mmol/L 7.0 10.0 10.0 6.0   CREATININE mg/dL 0.63 0.62 0.64 0.71   BUN mg/dL 14 17 16 15   BUN / CREAT RATIO  22.2 27.4* 25.0 21.1   CALCIUM mg/dL 9.3 9.5 9.6 9.3   EGFR IF NONAFRICN AM mL/min/1.73 95 97 93 83   ALK PHOS U/L 78 78 83 75   TOTAL PROTEIN g/dL 6.7 7.0 7.5 7.2   ALT (SGPT) U/L 10 17 11 18   AST (SGOT) U/L 18 19 15 26  "  BILIRUBIN mg/dL 0.4 0.5 0.5 0.7   ALBUMIN g/dL 4.30 4.50 4.70 4.20   GLOBULIN gm/dL 2.4 2.5 2.8 3.0       Lab Results - Last 18 Months   Lab Units 10/09/20  0813 04/10/20  0916 12/03/19  0800 07/30/19  0758   CEA ng/mL 2.03 2.08 2.18 2.27       No results for input(s): IRON, TIBC, LABIRON, FERRITIN, [id number], TSH, FOLATE in the last 83564 hours.    Invalid input(s): VITB12    Adriana Samuels reports a pain score of 0.      Patient's Body mass index is 24.21 kg/m². BMI is within normal parameters. No follow-up required..      ASSESSMENT:  1.   Carcinoma of the right upper outer breast. Invasive ductal carcinoma:  Current Stage: AJCC IIA (pT1b, pN1a, M0, G2)   Tumor size 9 mm.  Hormone Status: %, NE 86%, and negative HER-2/gregory.  Baseline Node Status: 1/2 sentinel node positive for metastasis.   Complications of Tumor: None.   Treatment status: Post adjuvant Taxotere and Cytoxan.   Post adjuvant radiation 02/2019.   Declined adjuvant Femara.   Prognosis: Fair.  2.   Performance status of 0.   3.   Self directed care.   4.   Osteopenia. On Caltrate D.  5.   Internal hemorrhoids.   6.   Elevated CEA, history of.    7.   Rash from Decadron.  8.   Grade 1 nausea from chemo, resolved.  9.   Grade 1 fatigue from chemo, resolved.         PLAN:  1.    Re:  Annual mammogram due 10/29/2020.  2.    Re:  Heme status.  Hemoglobin 13.6, WBC 3.9 and platelet 203.  3.    Re:  Pre-office CMP.  GFR 95 ml/minute.   4.    Re:  Pre-office CEA.  Normal at 2.03.  5.    Re:  Pre-office CA27-29.  Normal at 18.2    6.    Re:  Role of adjuvant Femara. Patient again declined.  \"I don't want to. No thank you.\"  7.   eRx for Femara 2.5 mg p.o. daily, 60, 3 refills. She declined.  Observe for adverse side effects.  Aware of potential hot flashes, bone loss, weight gain, and thrombosis.   8.   Continue ongoing management per primary care physician and other specialists.  9.    Continue currently " identified medications.  10.  Plan of care discussed with patient.  Understanding expressed.  Patient is agreeable to proceed.  11.  She will be seen every 4 months for the first 2 years then every 6 months for the next 3 years.  Annual mammogram.   12.  Schedule mammogram 10/29/2020 at [facility].  She will see Dr. Lawrence 11/2020 and she will undergo breast exam.  13.  Return to the office in 6 months with pre-office CBC with differential, CMP, CA27-29, and CEA.          I spent 26 total minutes, face-to-face, caring for Adriana today.  Greater than 50% of this time involved counseling and/or coordination of care as documented within this note regarding the patient's illness(es), pros and cons of various treatment options, instructions and/or risk reduction.             cc: MD Duglas Jean Baptiste MD         (Jonathan Williamson MD)

## 2020-10-16 ENCOUNTER — OFFICE VISIT (OUTPATIENT)
Dept: ONCOLOGY | Facility: CLINIC | Age: 65
End: 2020-10-16

## 2020-10-16 VITALS
DIASTOLIC BLOOD PRESSURE: 68 MMHG | RESPIRATION RATE: 18 BRPM | BODY MASS INDEX: 24.11 KG/M2 | HEIGHT: 66 IN | SYSTOLIC BLOOD PRESSURE: 124 MMHG | HEART RATE: 103 BPM | OXYGEN SATURATION: 99 % | WEIGHT: 150 LBS | TEMPERATURE: 98.5 F

## 2020-10-16 DIAGNOSIS — Z17.0 MALIGNANT NEOPLASM OF UPPER-OUTER QUADRANT OF RIGHT BREAST IN FEMALE, ESTROGEN RECEPTOR POSITIVE (HCC): Primary | ICD-10-CM

## 2020-10-16 DIAGNOSIS — C50.411 MALIGNANT NEOPLASM OF UPPER-OUTER QUADRANT OF RIGHT BREAST IN FEMALE, ESTROGEN RECEPTOR POSITIVE (HCC): Primary | ICD-10-CM

## 2020-10-16 PROCEDURE — 99214 OFFICE O/P EST MOD 30 MIN: CPT | Performed by: INTERNAL MEDICINE

## 2020-10-16 RX ORDER — NICOTINE POLACRILEX 2 MG
1 GUM BUCCAL DAILY
COMMUNITY

## 2020-10-29 ENCOUNTER — HOSPITAL ENCOUNTER (OUTPATIENT)
Dept: MAMMOGRAPHY | Facility: HOSPITAL | Age: 65
Discharge: HOME | End: 2020-10-29
Admitting: SPECIALIST

## 2020-10-29 PROCEDURE — G0279 TOMOSYNTHESIS, MAMMO: HCPCS

## 2020-10-29 PROCEDURE — 77066 DX MAMMO INCL CAD BI: CPT

## 2020-11-04 ENCOUNTER — TRANSCRIBE ORDERS (OUTPATIENT)
Dept: ADMINISTRATIVE | Facility: HOSPITAL | Age: 65
End: 2020-11-04

## 2020-11-04 DIAGNOSIS — Z12.31 ENCOUNTER FOR SCREENING MAMMOGRAM FOR MALIGNANT NEOPLASM OF BREAST: Primary | ICD-10-CM

## 2020-11-04 DIAGNOSIS — Z85.3 HISTORY OF BREAST CANCER: ICD-10-CM

## 2021-03-25 ENCOUNTER — IMMUNIZATION (OUTPATIENT)
Age: 66
End: 2021-03-25
Payer: MEDICARE

## 2021-03-25 PROCEDURE — 91300 COVID-19, PFIZER VACCINE 30MCG/0.3ML DOSE: CPT | Performed by: FAMILY MEDICINE

## 2021-03-25 PROCEDURE — 0001A PR IMM ADMN SARSCOV2 30MCG/0.3ML DIL RECON 1ST DOSE: CPT | Performed by: FAMILY MEDICINE

## 2021-04-08 NOTE — PROGRESS NOTES
[facility]  [address]  [facility] [address] [phone]    Patient Name: Adriana Samuels  Encounter Date: 04/16/2021  YOB: 1955  Patient Number: [id number]      REASON FOR FOLLOW-UP: Ms. Adriana Samuels is a pleasant 65-year-old  female, post menopausal, who is seen on followup for Stage IIA right breast cancer.  She is seen 28.5 months from 4 cycles of adjuvant Taxotere and Cytoxan.  She declined adjuvant Femara.  She is seen alone.  She is a reliable historian.        Oncology/Hematology History Overview Note   IAGNOSTIC ABNORMALITIES:   The patient had a mammogram from [facility] that showed a lesion 5.6 mm at the 10 o'clock position also noted on ultrasound suspicious for malignancy. The patient was referred for nodule in the right upper outer breast.  The patient was seen by Dr. Lawrence 09/05/2018.  Examination showed small palpable abnormality at 10 and 11 o'clock positions, 0.5 cm to 1 cm in size. No adenopathy noted. Patient planned for excision and sentinel node evaluation.  Pathology report 09/11/2018: Invasive ductal carcinoma intermediate grade, 1 out of 2 sentinel lymph nodes positive for metastasis. The largest metastatic focus is 2.5 mm. Negative for extracapsular extension. Tumor measures 9 mm in greatest linear dimension. Margins of resection are negative. Tumor measures 2 mm from closest anterior margin. AJCC T1b, N1a. ER/WV positive and HER-2/gregory negative.  CT abdomen and pelvis performed at [facility] on 09/21/2018 was revealing for IMPRESSION: 1. Post therapy changes right breast and axilla. 2. Linear nodularity left lung base, suspect chronic changes. 3. Otherwise, No CT evidence of definite metastatic disease in chest or acute cardiopulmonary process.   Bone scan performed at [facility] on 09/21/2018 was revealing for IMPRESSION: 1. Focal increased tracer activity  identified in the left lateral mid-cervical spine, suspect degenerative changes. Plain radiographic imaging could further characterize. 2. Otherwise, No scintigraphic evidence of osseous metastases.   Ultrasound of liver performed at [facility] on 09/26/2018 was revealing for Summary:  Multiple hepatic cysts.      PREVIOUS INTERVENTIONS:   The patient had undergone right breast lumpectomy with sentinel lymph node biopsy 09/10/2018 by Dr. Lawrence.  Adjuvant Taxotere and Cytoxan 09/25/2018 through 11/27/2018 at the [facility]. 4 cycles.   Adjuvant radiation completed 02/2019 by Dr. Williamson.  Adjuvant Femara 2.5 mg po daily, she declined.            Malignant neoplasm of upper-outer quadrant of right female breast (CMS/HCC)    Initial Diagnosis    Malignant neoplasm of right female breast (CMS/HCC)     9/10/2018 Surgery    Final Diagnosis   1.  Breast, right, lumpectomy and sentinel lymph node resection:  Invasive ductal carcinoma, intermediate grade  Tumor measures 9 mm in greatest linear dimension  Margins of resection are negative  Tumor measures 2 mm from the closest, anterior, margin.     Positive for metastatic ductal carcinoma in one out of 2 sentinel lymph nodes (1/2)  The largest metastatic focus is 2.5 mm  Negative for extra capsular extension     AJCC cancer stage: pT1b, (sn)pN1a            9/21/2018 Imaging    Ct Chest With Contrast    Result Date: 9/21/2018  1. Post therapy changes right breast and axilla. 2. Linear nodularity left lung base, suspect chronic changes. 3. Otherwise, No CT evidence of definite metastatic disease in chest or acute cardiopulmonary process. This report was finalized on 09/21/2018 13:03 by Dr. Molina Salazar MD.    Nm Bone Scan Whole Body    Result Date: 9/21/2018  1. Focal increased tracer activity identified in the left lateral mid cervical spine, suspect degenerative changes. Plain radiographic imaging could further characterize. 2. Otherwise, No scintigraphic evidence  of osseous metastases. This report was finalized on 09/21/2018 14:21 by Dr. Molina Salazar MD.    Ct Abdomen Pelvis With Contrast    Result Date: 9/21/2018  1. Multiple low-attenuation hepatic lesions, hepatic cyst most likely. 2. Suspect uterine fibroid 3. Otherwise, No CT evidence of metastatic disease or acute intra-abdominal/pelvic pathological process. This report was finalized on 09/21/2018 13:12 by Dr. Molina Salazar MD.       9/25/2018 - 11/27/2018 Chemotherapy    Taxotere Cytoxan     1/15/2019 - 2/25/2019 Radiation    Radiation OncologyTreatment Course:  Adriana Samuels received 6000 cGy in 30 fractions to right breast, right supraclavicular area, and right axilla via external beam radiation therapy.     10/28/2019 Imaging    Bilateral Diagnostic Mammogram:  IMPRESSION:  Posttherapeutic changes of the right breast are stable over  a one year time period and no suspicious findings are identified. The  patient is due to return in 12 months for bilateral digital mammograms.  BI-RADS Category 2, benign.         PAST MEDICAL HISTORY:  ALLERGIES:  Allergies   Allergen Reactions   • Bactrim [Sulfamethoxazole-Trimethoprim] Rash   • Cortisone Rash   • Decadron [Dexamethasone] Hives   • Hydrocortisone Hives     Oral or IV   • Erythromycin Nausea Only and Rash     CURRENT MEDICATIONS:  Outpatient Encounter Medications as of 4/16/2021   Medication Sig Dispense Refill   • aspirin 81 MG chewable tablet Chew 81 mg Daily.     • Biotin 1 MG capsule Take  by mouth.     • Calcium Carb-Cholecalciferol (CALCIUM 1000 + D PO) Take  by mouth Daily.     • glucosamine sulfate 500 MG capsule capsule Take  by mouth 3 (Three) Times a Day With Meals.     • Loratadine (CLARITIN PO) Take 10 mg by mouth Daily.     • Multiple Vitamins-Minerals (MULTIVITAMIN WITH MINERALS) tablet tablet Take 1 tablet by mouth Daily.     • Probiotic Product (PROBIOTIC PO) Take 1 capsule by mouth Daily.       No facility-administered encounter medications on file  as of 4/16/2021.     ADULT ILLNESSES:  Patient Active Problem List   Diagnosis Code   • Malignant neoplasm of upper-outer quadrant of right female breast (CMS/HCC) C50.411   • Non-smoker Z78.9   • S/P lumpectomy, right breast Z98.890   • S/P lymph node biopsy Z98.890   • History of radiation therapy Z92.3     SURGERIES:  Past Surgical History:   Procedure Laterality Date   • BREAST BIOPSY Right 09/2018    positive   • BREAST LUMPECTOMY Right 09/2018    lumpectomy with chemo and rad tx   • BREAST LUMPECTOMY WITH SENTINEL NODE BIOPSY Right 9/10/2018    Procedure: RIGHT BREAST LUMPECTOMY WITH SENTINEL LYMPH NODE BIOPSY, INJECTION AND SCAN;  Surgeon: Duglas Lawrence MD;  Location: [facility] OR;  Service: General   • COLONOSCOPY N/A 6/6/2019    Procedure: COLONOSCOPY WITH ANESTHESIA;  Surgeon: Brock Raman MD;  Location: [facility] ENDOSCOPY;  Service: Gastroenterology   • HYDROCELE EXCISION / REPAIR     • LASER ABLATION OF THE CERVIX     • WRIST FUSION       HEALTH MAINTENANCE ITEMS:  Health Maintenance Due   Topic Date Due   • TDAP/TD VACCINES (1 - Tdap) Never done   • ZOSTER VACCINE (1 of 2) Never done   • HEPATITIS C SCREENING  Never done   • ANNUAL WELLNESS VISIT  Never done   • Pneumococcal Vaccine 65+ (1 of 1 - PPSV23) Never done       <no information>  Last Completed Colonoscopy       Status Date      COLONOSCOPY Done 6/6/2019 COLONOSCOPY     Patient has more history with this topic...          There is no immunization history on file for this patient.  Last Completed Mammogram       Status Date      MAMMOGRAM Done 10/29/2020 MAMMO DIAGNOSTIC DIGITAL TOMOSYNTHESIS BILATERAL W CAD     Patient has more history with this topic...            FAMILY HISTORY:  Family History   Problem Relation Age of Onset   • No Known Problems Mother    • Skin cancer Father    • No Known Problems Sister    • No Known Problems Brother    • No Known Problems Sister    • No Known Problems Brother    • No Known Problems Son    • No Known  "Problems Son    • No Known Problems Son    • No Known Problems Daughter    • No Known Problems Maternal Grandmother    • No Known Problems Paternal Grandmother    • No Known Problems Maternal Aunt    • No Known Problems Paternal Aunt    • No Known Problems Other    • Breast cancer Neg Hx    • Colon cancer Neg Hx    • Colon polyps Neg Hx    • BRCA 1/2 Neg Hx    • Endometrial cancer Neg Hx    • Ovarian cancer Neg Hx      SOCIAL HISTORY:  Social History     Socioeconomic History   • Marital status:      Spouse name: Not on file   • Number of children: Not on file   • Years of education: Not on file   • Highest education level: Not on file   Tobacco Use   • Smoking status: Never Smoker   • Smokeless tobacco: Never Used   Substance and Sexual Activity   • Alcohol use: Yes     Comment: RARELY   • Drug use: No   • Sexual activity: Defer       REVIEW OF SYSTEMS:    Review of Systems   Constitutional: Negative for chills, fatigue and fever.        \"I feel fine.\"   Respiratory: Negative for cough, shortness of breath and wheezing.    Cardiovascular: Negative for chest pain and palpitations.   Gastrointestinal: Negative for abdominal pain, constipation, diarrhea, nausea and vomiting.   Genitourinary: Negative for difficulty urinating, flank pain and hematuria.   Musculoskeletal: Negative for gait problem and joint swelling.   Skin: Negative for pallor.   Neurological: Negative for dizziness, speech difficulty and weakness.   Psychiatric/Behavioral: Negative for agitation, confusion and hallucinations.       VITAL SIGNS: /62   Pulse 88   Temp 98.1 °F (36.7 °C)   Resp 18   Ht 167.6 cm (66\")   Wt 67.4 kg (148 lb 9.6 oz)   SpO2 96%   Breastfeeding No   BMI 23.98 kg/m²   Pain Score    04/16/21 0832   PainSc: 0-No pain       PHYSICAL EXAMINATION:     Physical Exam  Vitals reviewed.   Constitutional:       General: She is not in acute distress.  Cardiovascular:      Rate and Rhythm: Normal rate and regular " rhythm.   Pulmonary:      Effort: No respiratory distress.      Breath sounds: No wheezing or rales.   Abdominal:      General: Abdomen is flat. Bowel sounds are normal.      Palpations: Abdomen is soft.      Tenderness: There is no abdominal tenderness.   Musculoskeletal:         General: No swelling.   Skin:     General: Skin is warm and dry.      Coloration: Skin is not pale.   Neurological:      Mental Status: She is alert and oriented to person, place, and time.   Psychiatric:         Mood and Affect: Mood normal.         Behavior: Behavior normal.         Thought Content: Thought content normal.         Judgment: Judgment normal.         LABS    Lab Results - Last 18 Months   Lab Units 04/09/21  0836 10/09/20  0813 04/10/20  0916 12/03/19  0800   HEMOGLOBIN g/dL 12.9 13.6 13.6 14.3   HEMATOCRIT % 41.0 42.2 42.7 43.3   MCV fL 87.8 86.5 87.1 85.6   WBC 10*3/mm3 4.28 3.70 3.50 3.78   RDW % 13.3 13.2 13.2 12.8   MPV fL 9.7 9.5 10.0 9.3   PLATELETS 10*3/mm3 212 203 217 218   IMM GRAN % % 0.0 0.0 0.0 0.3   NEUTROS ABS 10*3/mm3 2.56 2.18 2.01 2.33   LYMPHS ABS 10*3/mm3 1.02 1.00 0.90 0.87   MONOS ABS 10*3/mm3 0.45 0.36 0.42 0.42   EOS ABS 10*3/mm3 0.21 0.13 0.11 0.10   BASOS ABS 10*3/mm3 0.04 0.03 0.06 0.05   IMMATURE GRANS (ABS) 10*3/mm3 0.00 0.00 0.00 0.01   NRBC /100 WBC 0.0 0.0 0.0 0.0       Lab Results - Last 18 Months   Lab Units 04/09/21  0836 10/09/20  0813 04/10/20  0916 12/03/19  0800   GLUCOSE mg/dL 104* 85 92 88   SODIUM mmol/L 145 143 143 143   POTASSIUM mmol/L 4.2 4.3 4.5 4.1   CO2 mmol/L 32.0* 31.0* 29.0 30.0*   CHLORIDE mmol/L 104 105 104 103   ANION GAP mmol/L 9.0 7.0 10.0 10.0   CREATININE mg/dL 0.62 0.63 0.62 0.64   BUN mg/dL 16 14 17 16   BUN / CREAT RATIO  25.8* 22.2 27.4* 25.0   CALCIUM mg/dL 9.1 9.3 9.5 9.6   EGFR IF NONAFRICN AM mL/min/1.73 96 95 97 93   ALK PHOS U/L 88 78 78 83   TOTAL PROTEIN g/dL 7.0 6.7 7.0 7.5   ALT (SGPT) U/L 9 10 17 11   AST (SGOT) U/L 19 18 19 15   BILIRUBIN mg/dL 0.4  "0.4 0.5 0.5   ALBUMIN g/dL 4.10 4.30 4.50 4.70   GLOBULIN gm/dL 2.9 2.4 2.5 2.8       Lab Results - Last 18 Months   Lab Units 04/09/21  0836 10/09/20  0813 04/10/20  0916 12/03/19  0800   CEA ng/mL 1.84 2.03 2.08 2.18       No results for input(s): IRON, TIBC, LABIRON, FERRITIN, [id number], TSH, FOLATE in the last [id number] hours.    Invalid input(s): VITB12    Adriana Samuels reports a pain score of 0.        Patient's Body mass index is 23.98 kg/m². BMI is within normal parameters. No follow-up required..    ASSESSMENT:  1.   Carcinoma of the right upper outer breast. Invasive ductal carcinoma:  Current Stage: AJCC IIA (pT1b, pN1a, M0, G2)   Tumor size 9 mm.  Hormone Status: %, ND 86%, and negative HER-2/gregory.  Baseline Node Status: 1/2 sentinel node positive for metastasis.   Complications of Tumor: None.   Treatment status: Post adjuvant Taxotere and Cytoxan.   Post adjuvant radiation 02/2019.   Declined adjuvant Femara.   Prognosis: Fair.  2.   Performance status of 0.   3.   Self directed care.   4.   Osteopenia. On Caltrate D.  5.   Internal hemorrhoids.   6.   Elevated CEA, history of.    7.   Rash from Decadron.  8.   Grade 1 nausea from chemo, resolved.  9.   Grade 1 fatigue from chemo, resolved.         PLAN:  1.    Re:  Mammogram report 10/29/2020. No mammographic evidence of malignancy.  2.    Re:  Heme status.  Hemoglobin 12.9.  3.    Re:  Pre-office CMP.  GFR 96 ml/minute.   4.    Re:  Pre-office CEA at 1.85.  5.    Re:  Pre-office CA27-29 at 15.5.  6.    Re:  Role of adjuvant Femara. Patient again declined.  \"No, am doing okay without it.\"  7.   eRx for Femara 2.5 mg p.o. daily, 60, 3 refills. She declined.  Observe for adverse side effects.  Aware of potential hot flashes, bone loss, weight gain, and thrombosis.   8.   Continue ongoing management per primary care physician and other specialists.  9.    Plan of care discussed with patient.  Understanding " expressed.  Patient is agreeable to proceed.  10.  She will be seen every 4 months for the first 2 years then every 6 months for the next 3 years.  Annual mammogram.   11.  Schedule mammogram 11/01/2021 at [facility] [place], operated by [facility].  She will see Dr. Lawrence 11/05/2021 and she will undergo breast exam.  12.  Return to the office in 6 months with pre-office CBC with differential, CMP, CA27-29, and CEA.   13.  Advance Care Planning   ACP discussion was held with the patient during this visit. Patient has an advance directive (not in EMR), copy requested.       I have reviewed the assessment and plan and verified the accuracy of it. No changes to assessment and plan since the information was documented. Nahid Linton MD 04/16/21         I spent 21 total minutes, face-to-face, caring for Adriana today.  Greater than 50% of this time involved counseling and/or coordination of care as documented within this note regarding the patient's illness(es), pros and cons of various treatment options, instructions and/or risk reduction.            cc: MD Duglas Jean Baptiste MD         (Jonathan Williamson MD)

## 2021-04-09 ENCOUNTER — LAB (OUTPATIENT)
Dept: LAB | Facility: HOSPITAL | Age: 66
End: 2021-04-09

## 2021-04-09 DIAGNOSIS — C50.411 MALIGNANT NEOPLASM OF UPPER-OUTER QUADRANT OF RIGHT BREAST IN FEMALE, ESTROGEN RECEPTOR POSITIVE (HCC): ICD-10-CM

## 2021-04-09 DIAGNOSIS — Z17.0 MALIGNANT NEOPLASM OF UPPER-OUTER QUADRANT OF RIGHT BREAST IN FEMALE, ESTROGEN RECEPTOR POSITIVE (HCC): ICD-10-CM

## 2021-04-09 LAB
ALBUMIN SERPL-MCNC: 4.1 G/DL (ref 3.5–5.2)
ALBUMIN/GLOB SERPL: 1.4 G/DL
ALP SERPL-CCNC: 88 U/L (ref 39–117)
ALT SERPL W P-5'-P-CCNC: 9 U/L (ref 1–33)
ANION GAP SERPL CALCULATED.3IONS-SCNC: 9 MMOL/L (ref 5–15)
AST SERPL-CCNC: 19 U/L (ref 1–32)
BASOPHILS # BLD AUTO: 0.04 10*3/MM3 (ref 0–0.2)
BASOPHILS NFR BLD AUTO: 0.9 % (ref 0–1.5)
BILIRUB SERPL-MCNC: 0.4 MG/DL (ref 0–1.2)
BUN SERPL-MCNC: 16 MG/DL (ref 8–23)
BUN/CREAT SERPL: 25.8 (ref 7–25)
CALCIUM SPEC-SCNC: 9.1 MG/DL (ref 8.6–10.5)
CEA SERPL-MCNC: 1.84 NG/ML
CHLORIDE SERPL-SCNC: 104 MMOL/L (ref 98–107)
CO2 SERPL-SCNC: 32 MMOL/L (ref 22–29)
CREAT SERPL-MCNC: 0.62 MG/DL (ref 0.57–1)
DEPRECATED RDW RBC AUTO: 42.6 FL (ref 37–54)
EOSINOPHIL # BLD AUTO: 0.21 10*3/MM3 (ref 0–0.4)
EOSINOPHIL NFR BLD AUTO: 4.9 % (ref 0.3–6.2)
ERYTHROCYTE [DISTWIDTH] IN BLOOD BY AUTOMATED COUNT: 13.3 % (ref 12.3–15.4)
GFR SERPL CREATININE-BSD FRML MDRD: 96 ML/MIN/1.73
GLOBULIN UR ELPH-MCNC: 2.9 GM/DL
GLUCOSE SERPL-MCNC: 104 MG/DL (ref 65–99)
HCT VFR BLD AUTO: 41 % (ref 34–46.6)
HGB BLD-MCNC: 12.9 G/DL (ref 12–15.9)
IMM GRANULOCYTES # BLD AUTO: 0 10*3/MM3 (ref 0–0.05)
IMM GRANULOCYTES NFR BLD AUTO: 0 % (ref 0–0.5)
LYMPHOCYTES # BLD AUTO: 1.02 10*3/MM3 (ref 0.7–3.1)
LYMPHOCYTES NFR BLD AUTO: 23.8 % (ref 19.6–45.3)
MCH RBC QN AUTO: 27.6 PG (ref 26.6–33)
MCHC RBC AUTO-ENTMCNC: 31.5 G/DL (ref 31.5–35.7)
MCV RBC AUTO: 87.8 FL (ref 79–97)
MONOCYTES # BLD AUTO: 0.45 10*3/MM3 (ref 0.1–0.9)
MONOCYTES NFR BLD AUTO: 10.5 % (ref 5–12)
NEUTROPHILS NFR BLD AUTO: 2.56 10*3/MM3 (ref 1.7–7)
NEUTROPHILS NFR BLD AUTO: 59.9 % (ref 42.7–76)
NRBC BLD AUTO-RTO: 0 /100 WBC (ref 0–0.2)
PLATELET # BLD AUTO: 212 10*3/MM3 (ref 140–450)
PMV BLD AUTO: 9.7 FL (ref 6–12)
POTASSIUM SERPL-SCNC: 4.2 MMOL/L (ref 3.5–5.2)
PROT SERPL-MCNC: 7 G/DL (ref 6–8.5)
RBC # BLD AUTO: 4.67 10*6/MM3 (ref 3.77–5.28)
SODIUM SERPL-SCNC: 145 MMOL/L (ref 136–145)
WBC # BLD AUTO: 4.28 10*3/MM3 (ref 3.4–10.8)

## 2021-04-09 PROCEDURE — 36415 COLL VENOUS BLD VENIPUNCTURE: CPT

## 2021-04-09 PROCEDURE — 86300 IMMUNOASSAY TUMOR CA 15-3: CPT

## 2021-04-09 PROCEDURE — 80053 COMPREHEN METABOLIC PANEL: CPT

## 2021-04-09 PROCEDURE — 82378 CARCINOEMBRYONIC ANTIGEN: CPT

## 2021-04-09 PROCEDURE — 85025 COMPLETE CBC W/AUTO DIFF WBC: CPT

## 2021-04-10 LAB — CANCER AG27-29 SERPL-ACNC: 15.5 U/ML (ref 0–38.6)

## 2021-04-15 ENCOUNTER — IMMUNIZATION (OUTPATIENT)
Age: 66
End: 2021-04-15
Payer: MEDICARE

## 2021-04-15 PROCEDURE — 91300 COVID-19, PFIZER VACCINE 30MCG/0.3ML DOSE: CPT | Performed by: FAMILY MEDICINE

## 2021-04-15 PROCEDURE — 0002A PR IMM ADMN SARSCOV2 30MCG/0.3ML DIL RECON 2ND DOSE: CPT | Performed by: FAMILY MEDICINE

## 2021-04-16 ENCOUNTER — OFFICE VISIT (OUTPATIENT)
Dept: ONCOLOGY | Facility: CLINIC | Age: 66
End: 2021-04-16

## 2021-04-16 VITALS
SYSTOLIC BLOOD PRESSURE: 124 MMHG | OXYGEN SATURATION: 96 % | WEIGHT: 148.6 LBS | TEMPERATURE: 98.1 F | BODY MASS INDEX: 23.88 KG/M2 | RESPIRATION RATE: 18 BRPM | HEIGHT: 66 IN | DIASTOLIC BLOOD PRESSURE: 62 MMHG | HEART RATE: 88 BPM

## 2021-04-16 DIAGNOSIS — Z17.0 MALIGNANT NEOPLASM OF UPPER-OUTER QUADRANT OF RIGHT BREAST IN FEMALE, ESTROGEN RECEPTOR POSITIVE (HCC): Primary | ICD-10-CM

## 2021-04-16 DIAGNOSIS — C50.411 MALIGNANT NEOPLASM OF UPPER-OUTER QUADRANT OF RIGHT BREAST IN FEMALE, ESTROGEN RECEPTOR POSITIVE (HCC): Primary | ICD-10-CM

## 2021-04-16 PROCEDURE — 99213 OFFICE O/P EST LOW 20 MIN: CPT | Performed by: INTERNAL MEDICINE

## 2021-04-27 ENCOUNTER — HOSPITAL ENCOUNTER (OUTPATIENT)
Dept: RADIATION ONCOLOGY | Facility: HOSPITAL | Age: 66
Setting detail: RADIATION/ONCOLOGY SERIES
End: 2021-04-27

## 2021-04-27 PROBLEM — Z79.811 PROPHYLACTIC USE OF LETROZOLE (FEMARA): Status: ACTIVE | Noted: 2021-04-27

## 2021-04-27 NOTE — PROGRESS NOTES
[facility], SMariellaMD Chiqui Rousseau BSN, PA-C  _______________________________________________  [facility]  Department of Radiation Oncology  [address] [place] [id number]  Office: [phone]  Fax: [phone]    DATE:  04/28/2021  PATIENT: Adriana Samuels  1955                         MEDICAL RECORD #:  [id number]                                                       Reason for Visit:   Chief Complaint   Patient presents with   • Breast Cancer     Adriana Samuels is a very pleasant 65 y.o. patient that has completed radiation therapy for carcinoma of the right breast and returns to the clinic today for routine follow up exam. Denies activity change, appetite change, unexpected weight change, nausea/vomiting, diarrhea, light-headedness, weakness, and headaches. Continues to follow .     HISTORY OF PRESENT ILLNESS  Diagnosed in September 2018 withStage IIA (T1b, cN1a, cM0, G2) Invasive Ductal Carcinoma, ER/ID+ 1/2 SNB positive for metastatic carcinoma. Treated with neoadjuvant chemotherapy under the direction of Dr. Linton. Underwent right breast lumpectomy and sentinel lymph node resection on 9/10/2018. Pathology report states the residual tumor measures 9 mm, Margins negative, 2 mm from the closest, anterior, margin. 1/2 sentinel lymph nodes positive for metastatic disease, 2.5 mm. Negative for extra capsular extension. Treated with adjuvant radiation therapy to right breast and axillary nodes with completion on 02/25/2019. Declined Femara. Follows Dr. Linton and Dr Lawrence.    Bilateral mammogram/ right breast US:  • revealed a lesion that was 5 x 6 mm at the 10:00 position noted in the right breast by US. This is very suspicious for a malignancy.     09/05/2018 - Appointment with :  • Rather than obtaining an US biopsy the patient would like to proceed with right breast lumpectomy   • Proceed with excision of the overlying skin and the  underlying mass with a sentinel lymph node evaluation.     09/10/2018 - Breast, right, lumpectomy and sentinel lymph node resection:  • Invasive ductal carcinoma, intermediate grade  • Tumor measures 9 mm in greatest linear dimension  • Margins of resection are negative  • Tumor measures 2 mm from the closest, anterior, margin.  • Positive for metastatic ductal carcinoma in one out of 2 sentinel lymph nodes (1/2)  • The largest metastatic focus is 2.5 mm  • Negative for extra capsular extension  • %, DC 86%, HER2 -negative     09/18/2018 -Documentation per :  • Dr. Linton called about this patient today.  We have an appointment to see her later this week to advise us of his recommendations.  She had a 9 mm breast cancer which is invasive ductal carcinoma with 1 of 2 sentinel lymph nodes positive for metastatic disease.  • Therefore she will be treated with adjuvant chemotherapy prior to any radiation.  Due to the positive lymph node I will anticipate treating the breast and regional lymph nodes over 6 weeks.  However we will defer radiation until completion of chemotherapy.    09/20/2018 - Consult with :  • Patient will be initiating chemotherapy on 09/25/2018 under the care of . She will return for further treatment recommendations regarding radiation therapy after completion of chemotherapy.     09/21/2018 - CT Chest with contrast:  • Post therapy changes right breast and axilla.  • Linear nodularity left lung base, suspect chronic changes.  • Otherwise, No CT evidence of definite metastatic disease in chest or acute cardiopulmonary process.    09/21/2018 - CT Abdomen/Pelvis with contrast:  • Multiple low-attenuation hepatic lesions, hepatic cyst most likely.  • Suspect uterine fibroid  • Otherwise, No CT evidence of metastatic disease or acute intra-abdominal/pelvic pathological process.    09/21/2018 - Bone Scan:  • Focal increased tracer activity identified in the left lateral mid  cervical spine, suspect degenerative changes. Plain radiographic imaging could further characterize.  • Otherwise, No scintigraphic evidence of osseous metastases.    09/25/2018 - 11/27/2018 - Chemotherapy course:  • Taxotere/Cytoxan - 4 cycles    09/26/2018 - US Liver:  • Multiple hepatic cysts.    12/11/2018 - Appointment with :  • Referral to  for adjuvant radiation   • Start Femara after radiation therapy.   • Follow up in 4 months     12/31/2018 - Appointment with :  • CT Simulation completed for Stage IIA (T1b, cN1a, cM0, G2) Invasive Ductal Carcinoma, ER/PA+ 1/2 SNB positive for metastatic carcinoma with plans to begin radiation therapy after treatment planning.   • Return for initiation of treatment when plan completed.     01/15/2019 - 02/25/2019 - Completed radiation course:  • Received 6000 cGy in 30 fractions to right breast, right supraclavicular area, and right axilla via external beam radiation therapy.    10/28/2019 - Bilateral Diagnostic Mammogram:  • Posttherapeutic changes of the right breast are stable over a one year time period and no suspicious findings are identified. The patient is due to return in 12 months for bilateral digital mammograms.   • BI-RADS Category 2, benign.    10/31/2019 - Appointment with :  • Return to Dr. Williamson in 6 months  • Follow with Dr. Lawrence and Dr. Linton as scheduled    04/30/2020 - Appointment with  (Covering for ):  • Routine Mammogram is due October  • Continue self breast exams and report any unusual findings.  • Continue to follow with   • Return to  in 6 months for routine evaluation or sooner if needed.     10/29/2020 - Bilateral Diagnostic Mammogram:  • No mammographic evidence of malignancy.  • BI-RADS CATEGORY 2: Benign findings.  • Recommend annual screening mammography.    04/16/2021 - Appointment with :  ASSESSMENT:  • Carcinoma of the right upper outer breast. Invasive  "ductal carcinoma:  o Current Stage: AJCC IIA (pT1b, pN1a, M0, G2)   Tumor size 9 mm.  o Hormone Status: %, CA 86%, and negative HER-2/gregory.  o Baseline Node Status: 1/2 sentinel node positive for metastasis.   o Complications of Tumor: None.   o Treatment status: Post adjuvant Taxotere and Cytoxan.   Post adjuvant radiation 02/2019.   Declined adjuvant Femara.   • Prognosis: Fair.  • Performance status of 0.   • Self directed care.   • Osteopenia. On Caltrate D.  • Internal hemorrhoids.   • Elevated CEA, history of.    • Rash from Decadron.  • Grade 1 nausea from chemo, resolved.  • Grade 1 fatigue from chemo, resolved.   PLAN:  •  Re:  Mammogram report 10/29/2020. No mammographic evidence of malignancy.  •  Re:  Heme status.  Hemoglobin 12.9.  •  Re:  Pre-office CMP.  GFR 96 ml/minute.   •  Re:  Pre-office CEA at 1.85.  •  Re:  Pre-office CA27-29 at 15.5.  •  Re:  Role of adjuvant Femara. Patient again declined.  \"No, am doing okay without it.\"  • eRx for Femara 2.5 mg p.o. daily, 60, 3 refills. She declined.  Observe for adverse side effects.  Aware of potential hot flashes, bone loss, weight gain, and thrombosis.   • Continue ongoing management per primary care physician and other specialists.  • Plan of care discussed with patient.  Understanding expressed.  Patient is agreeable to proceed.  • She will be seen every 4 months for the first 2 years then every 6 months for the next 3 years.  Annual mammogram.   • Schedule mammogram 11/01/2021 at [facility], [place], operated by [facility].  She will see Dr. Lawrence 11/05/2021 and she will undergo breast exam.  • Return to the office in 6 months with pre-office CBC with differential, CMP, CA27-29, and CEA.   • Advance Care Planning  o ACP discussion was held with the patient during this visit. Patient has an advance directive (not in EMR), copy requested.    History obtained from  PATIENT and CHART    PAST MEDICAL HISTORY  Past Medical History: "   Diagnosis Date   • Breast cancer (CMS/HCC) 09/2018    right breast   • Cancer (CMS/HCC)    • Drug therapy 09/2018-11/2018   • Herpes zoster    • Hx of radiation therapy 02/2019    right breast   • Menorrhagia    • Mononucleosis    • PONV (postoperative nausea and vomiting)    • Shingles       PAST SURGICAL HISTORY  Past Surgical History:   Procedure Laterality Date   • BREAST BIOPSY Right 09/2018    positive   • BREAST LUMPECTOMY Right 09/2018    lumpectomy with chemo and rad tx   • BREAST LUMPECTOMY WITH SENTINEL NODE BIOPSY Right 9/10/2018    Procedure: RIGHT BREAST LUMPECTOMY WITH SENTINEL LYMPH NODE BIOPSY, INJECTION AND SCAN;  Surgeon: Duglas Lawrence MD;  Location: [facility] OR;  Service: General   • COLONOSCOPY N/A 6/6/2019    Procedure: COLONOSCOPY WITH ANESTHESIA;  Surgeon: Brock Raman MD;  Location: [facility] ENDOSCOPY;  Service: Gastroenterology   • HYDROCELE EXCISION / REPAIR     • LASER ABLATION OF THE CERVIX     • WRIST FUSION        FAMILY HISTORY  family history includes No Known Problems in her brother, brother, daughter, maternal aunt, maternal grandmother, mother, paternal aunt, paternal grandmother, sister, sister, son, son, son, and another family member; Skin cancer in her father.     SOCIAL HISTORY  Social History     Tobacco Use   • Smoking status: Never Smoker   • Smokeless tobacco: Never Used   Substance Use Topics   • Alcohol use: Yes     Comment: RARELY   • Drug use: No      ALLERGIES  Bactrim [sulfamethoxazole-trimethoprim], Cortisone, Decadron [dexamethasone], Hydrocortisone, and Erythromycin     MEDICATIONS  Current Outpatient Medications   Medication Sig Dispense Refill   • aspirin 81 MG chewable tablet Chew 81 mg Daily.     • Biotin 1 MG capsule Take 1 capsule by mouth Daily.     • Calcium Carb-Cholecalciferol (CALCIUM 1000 + D PO) Take  by mouth Daily.     • glucosamine sulfate 500 MG capsule capsule Take  by mouth 3 (Three) Times a Day With Meals.     • Loratadine (CLARITIN  "PO) Take 10 mg by mouth Daily.     • Multiple Vitamins-Minerals (MULTIVITAMIN WITH MINERALS) tablet tablet Take 1 tablet by mouth Daily.     • Probiotic Product (PROBIOTIC PO) Take 1 capsule by mouth Daily.       No current facility-administered medications for this visit.      The following portions of the patient's history were reviewed and updated as appropriate: allergies, current medications, past family history, past medical history, past social history, past surgical history and problem list.    REVIEW OF SYSTEMS  Review of Systems   Constitutional: Negative.    HENT: Negative.    Eyes:        Glasses   Respiratory: Negative.    Cardiovascular: Negative.    Gastrointestinal: Negative.    Endocrine: Negative.    Genitourinary: Negative.    Musculoskeletal: Negative.    Skin: Negative.    Allergic/Immunologic: Negative.    Neurological: Negative.    Hematological: Negative.    Psychiatric/Behavioral: Negative.      I have reviewed and confirmed the accuracy of the ROS as documented by the MA/LPN/SHIRA Hughes MD    PHYSICAL EXAM  VITAL SIGNS:   Vitals:    04/28/21 0906   BP: 128/70   Weight: 68 kg (150 lb)   Height: 167.6 cm (66\")   PainSc: 0-No pain      General Appearance:  In no acute distress, cooperative.  Head: Normocephalic  Eyes: Conjunctiva pink, pupils equal and reactive.   Ears:  Normal externally.  Nose/Sinuses:  Mucosa normal.  Mouth/Throat:  Mucosa moist, no lesions;  Neck: Supple, no mass, non-tender  Lungs:  Clear to auscultation.  No rales, rhonchi, or wheezing.  Heart:  Heart sounds are normal.  Regular rate and rhythm   Abdomen:  Soft, non-tender, normal bowel sounds; no bruits, organomegaly or masses.  Breasts: s/p radiation therapy to right breast-- faint hyperpigmentation; left breast normal without mass, skin or nipple changes or axillary nodes, surgical scars noted right breast, risk and benefit of breast self-exam was discussed.  Extremities: Warm to touch, pink, with no " edema.  Musculoskeletal: strength and sensation grossly normal  Neurologic:  Alert and oriented, gait normal, non-focal exam  Psych exam: normal situational behavior   Skin:  No suspicious lesions or rashes of concern     Performance Status: ECOG (0) Fully active, able to carry on all predisease performance without restriction    Clinical Quality Measures  -Pain Documented by Standardized Tool, FPS Adriana Samuels reports a pain score of 0. Given her pain assessment as noted, treatment options were discussed and the following options were decided upon as a follow-up plan to address the patient's pain: No pain, no plan given.    Pain Medications             aspirin 81 MG chewable tablet Chew 81 mg Daily.        -Advanced Care Planning Advance Care Planning  ACP discussion was held with the patient during this visit. Patient does not have an advance directive, information provided.    -Body Mass Index Screening and Follow-Up Plan  Patient's Body mass index is 24.21 kg/m². BMI is within normal parameters. No follow-up required..  -Tobacco Use: Screening and Cessation Intervention Social History    Tobacco Use      Smoking status: Never Smoker      Smokeless tobacco: Never Used    ASSESSMENT AND PLAN  1. Malignant neoplasm of upper-outer quadrant of right breast in female, estrogen receptor positive (CMS/HCC)    2. Prophylactic use of letrozole (Femara)    3. S/P lumpectomy, right breast    4. S/P lymph node biopsy    5. History of radiation therapy    6. Non-smoker      RECOMMENDATIONS: Adriana Samuels is status post completion of radiation therapy to the breast and presents to our clinic today for follow up exam. Diagnosed in September 2018 withStage IIA (T1b, cN1a, cM0, G2) Invasive Ductal Carcinoma, ER/OH+ 1/2 SNB positive for metastatic carcinoma. Treated with neoadjuvant chemotherapy under the direction of Dr. Linton. Underwent right breast lumpectomy and sentinel lymph node resection on 9/10/2018. Pathology report  states the residual tumor measures 9 mm, Margins negative, 2 mm from the closest, anterior, margin. 1/2 sentinel lymph nodes positive for metastatic disease, 2.5 mm, negative for extra capsular extension.  Status post adjuvant Taxotere and Cytoxan.   Treated with adjuvant radiation therapy to right breast and axillary nodes with completion on 02/25/2019. Declined Femara. Follows Dr. Linton.    We discussed expected post radiation long and short term effects, monthly self exams and NCCN surveillance recommendations. She is doing well and has no evidence of recurrent or metastatic disease today. Last mammogram was completed on 10/29/2020 and noted to be without evidence of recurrence, next mammogram is scheduled in October, 2021.     We plan to see the patient back in our clinic for routine follow-up in 1 year or sooner if needed.  In the meantime, she will follow-up with her other healthcare providers as per their scheduling.    Time Spent: I spent 32 minutes caring for Adriana on this date of service. This time includes time spent by me in the following activities: preparing for the visit, reviewing tests, obtaining and/or reviewing a separately obtained history, performing a medically appropriate examination and/or evaluation, counseling and educating the patient/family/caregiver and documenting information in the medical record.  I saw this patient in follow-up with Chiqui Cade PA-C while covering for Dr. Jonathan Williamson, radiation oncologist.  Tino Hughes MD  04/28/2021

## 2021-04-28 ENCOUNTER — OFFICE VISIT (OUTPATIENT)
Dept: RADIATION ONCOLOGY | Facility: HOSPITAL | Age: 66
End: 2021-04-28

## 2021-04-28 VITALS
WEIGHT: 150 LBS | DIASTOLIC BLOOD PRESSURE: 70 MMHG | BODY MASS INDEX: 24.11 KG/M2 | HEIGHT: 66 IN | SYSTOLIC BLOOD PRESSURE: 128 MMHG

## 2021-04-28 DIAGNOSIS — Z79.811 PROPHYLACTIC USE OF LETROZOLE (FEMARA): ICD-10-CM

## 2021-04-28 DIAGNOSIS — Z17.0 MALIGNANT NEOPLASM OF UPPER-OUTER QUADRANT OF RIGHT BREAST IN FEMALE, ESTROGEN RECEPTOR POSITIVE (HCC): Primary | ICD-10-CM

## 2021-04-28 DIAGNOSIS — Z98.890 S/P LUMPECTOMY, RIGHT BREAST: ICD-10-CM

## 2021-04-28 DIAGNOSIS — Z92.3 HISTORY OF RADIATION THERAPY: ICD-10-CM

## 2021-04-28 DIAGNOSIS — Z78.9 NON-SMOKER: ICD-10-CM

## 2021-04-28 DIAGNOSIS — Z98.890 S/P LYMPH NODE BIOPSY: ICD-10-CM

## 2021-04-28 DIAGNOSIS — C50.411 MALIGNANT NEOPLASM OF UPPER-OUTER QUADRANT OF RIGHT BREAST IN FEMALE, ESTROGEN RECEPTOR POSITIVE (HCC): Primary | ICD-10-CM

## 2021-04-28 PROCEDURE — G0463 HOSPITAL OUTPT CLINIC VISIT: HCPCS | Performed by: RADIOLOGY

## 2021-09-29 ENCOUNTER — TRANSCRIBE ORDERS (OUTPATIENT)
Dept: ADMINISTRATIVE | Facility: HOSPITAL | Age: 66
End: 2021-09-29

## 2021-09-29 DIAGNOSIS — Z78.0 ENCOUNTER FOR OSTEOPOROSIS SCREENING IN ASYMPTOMATIC POSTMENOPAUSAL PATIENT: ICD-10-CM

## 2021-09-29 DIAGNOSIS — Z13.820 SPECIAL SCREENING FOR OSTEOPOROSIS: Primary | ICD-10-CM

## 2021-09-29 DIAGNOSIS — Z13.820 ENCOUNTER FOR OSTEOPOROSIS SCREENING IN ASYMPTOMATIC POSTMENOPAUSAL PATIENT: ICD-10-CM

## 2021-10-07 ENCOUNTER — LAB (OUTPATIENT)
Dept: LAB | Facility: HOSPITAL | Age: 66
End: 2021-10-07

## 2021-10-07 DIAGNOSIS — C50.411 MALIGNANT NEOPLASM OF UPPER-OUTER QUADRANT OF RIGHT BREAST IN FEMALE, ESTROGEN RECEPTOR POSITIVE (HCC): ICD-10-CM

## 2021-10-07 DIAGNOSIS — Z17.0 MALIGNANT NEOPLASM OF UPPER-OUTER QUADRANT OF RIGHT BREAST IN FEMALE, ESTROGEN RECEPTOR POSITIVE (HCC): ICD-10-CM

## 2021-10-07 LAB
ALBUMIN SERPL-MCNC: 4.4 G/DL (ref 3.5–5.2)
ALBUMIN/GLOB SERPL: 1.9 G/DL
ALP SERPL-CCNC: 89 U/L (ref 39–117)
ALT SERPL W P-5'-P-CCNC: 11 U/L (ref 1–33)
ANION GAP SERPL CALCULATED.3IONS-SCNC: 6 MMOL/L (ref 5–15)
AST SERPL-CCNC: 17 U/L (ref 1–32)
BASOPHILS # BLD MANUAL: 0.04 10*3/MM3 (ref 0–0.2)
BASOPHILS NFR BLD AUTO: 1 % (ref 0–1.5)
BILIRUB SERPL-MCNC: 0.4 MG/DL (ref 0–1.2)
BUN SERPL-MCNC: 15 MG/DL (ref 8–23)
BUN/CREAT SERPL: 22.7 (ref 7–25)
CALCIUM SPEC-SCNC: 9.3 MG/DL (ref 8.6–10.5)
CEA SERPL-MCNC: 2.3 NG/ML
CHLORIDE SERPL-SCNC: 107 MMOL/L (ref 98–107)
CO2 SERPL-SCNC: 31 MMOL/L (ref 22–29)
CREAT SERPL-MCNC: 0.66 MG/DL (ref 0.57–1)
DEPRECATED RDW RBC AUTO: 41.8 FL (ref 37–54)
EOSINOPHIL # BLD MANUAL: 0.13 10*3/MM3 (ref 0–0.4)
EOSINOPHIL NFR BLD MANUAL: 3.1 % (ref 0.3–6.2)
ERYTHROCYTE [DISTWIDTH] IN BLOOD BY AUTOMATED COUNT: 13 % (ref 12.3–15.4)
GFR SERPL CREATININE-BSD FRML MDRD: 89 ML/MIN/1.73
GIANT PLATELETS: ABNORMAL
GLOBULIN UR ELPH-MCNC: 2.3 GM/DL
GLUCOSE SERPL-MCNC: 84 MG/DL (ref 65–99)
HCT VFR BLD AUTO: 42.9 % (ref 34–46.6)
HGB BLD-MCNC: 13.8 G/DL (ref 12–15.9)
LYMPHOCYTES # BLD MANUAL: 0.98 10*3/MM3 (ref 0.7–3.1)
LYMPHOCYTES NFR BLD MANUAL: 16.5 % (ref 19.6–45.3)
LYMPHOCYTES NFR BLD MANUAL: 6.2 % (ref 5–12)
MCH RBC QN AUTO: 28.3 PG (ref 26.6–33)
MCHC RBC AUTO-ENTMCNC: 32.2 G/DL (ref 31.5–35.7)
MCV RBC AUTO: 87.9 FL (ref 79–97)
MONOCYTES # BLD AUTO: 0.27 10*3/MM3 (ref 0.1–0.9)
NEUTROPHILS # BLD AUTO: 2.84 10*3/MM3 (ref 1.7–7)
NEUTROPHILS NFR BLD MANUAL: 66 % (ref 42.7–76)
PLASMA CELL PREC NFR BLD MANUAL: 1 % (ref 0–0)
PLATELET # BLD AUTO: 221 10*3/MM3 (ref 140–450)
PMV BLD AUTO: 9.6 FL (ref 6–12)
POTASSIUM SERPL-SCNC: 4.5 MMOL/L (ref 3.5–5.2)
PROT SERPL-MCNC: 6.7 G/DL (ref 6–8.5)
RBC # BLD AUTO: 4.88 10*6/MM3 (ref 3.77–5.28)
RBC MORPH BLD: NORMAL
SODIUM SERPL-SCNC: 144 MMOL/L (ref 136–145)
VARIANT LYMPHS NFR BLD MANUAL: 6.2 % (ref 0–5)
WBC # BLD AUTO: 4.31 10*3/MM3 (ref 3.4–10.8)
WBC MORPH BLD: NORMAL

## 2021-10-07 PROCEDURE — 85025 COMPLETE CBC W/AUTO DIFF WBC: CPT

## 2021-10-07 PROCEDURE — 82378 CARCINOEMBRYONIC ANTIGEN: CPT

## 2021-10-07 PROCEDURE — 36415 COLL VENOUS BLD VENIPUNCTURE: CPT

## 2021-10-07 PROCEDURE — 85007 BL SMEAR W/DIFF WBC COUNT: CPT

## 2021-10-07 PROCEDURE — 86300 IMMUNOASSAY TUMOR CA 15-3: CPT

## 2021-10-07 PROCEDURE — 80053 COMPREHEN METABOLIC PANEL: CPT

## 2021-10-08 LAB — CANCER AG27-29 SERPL-ACNC: 16.9 U/ML (ref 0–38.6)

## 2021-10-14 ENCOUNTER — OFFICE VISIT (OUTPATIENT)
Dept: ONCOLOGY | Facility: CLINIC | Age: 66
End: 2021-10-14

## 2021-10-14 VITALS
OXYGEN SATURATION: 95 % | BODY MASS INDEX: 24.08 KG/M2 | WEIGHT: 149.8 LBS | TEMPERATURE: 97.9 F | DIASTOLIC BLOOD PRESSURE: 78 MMHG | HEIGHT: 66 IN | RESPIRATION RATE: 18 BRPM | SYSTOLIC BLOOD PRESSURE: 140 MMHG | HEART RATE: 88 BPM

## 2021-10-14 DIAGNOSIS — C50.411 MALIGNANT NEOPLASM OF UPPER-OUTER QUADRANT OF RIGHT BREAST IN FEMALE, ESTROGEN RECEPTOR POSITIVE (HCC): Primary | ICD-10-CM

## 2021-10-14 DIAGNOSIS — Z17.0 MALIGNANT NEOPLASM OF UPPER-OUTER QUADRANT OF RIGHT BREAST IN FEMALE, ESTROGEN RECEPTOR POSITIVE (HCC): Primary | ICD-10-CM

## 2021-10-14 PROCEDURE — 99213 OFFICE O/P EST LOW 20 MIN: CPT | Performed by: INTERNAL MEDICINE

## 2021-10-14 RX ORDER — AZELASTINE 1 MG/ML
SPRAY, METERED NASAL
COMMUNITY
End: 2022-11-09

## 2021-11-01 ENCOUNTER — HOSPITAL ENCOUNTER (OUTPATIENT)
Dept: MAMMOGRAPHY | Facility: HOSPITAL | Age: 66
Discharge: HOME | End: 2021-11-01

## 2021-11-01 ENCOUNTER — HOSPITAL ENCOUNTER (OUTPATIENT)
Dept: BONE DENSITY | Facility: HOSPITAL | Age: 66
Discharge: HOME | End: 2021-11-01

## 2021-11-01 DIAGNOSIS — Z85.3 HISTORY OF BREAST CANCER: ICD-10-CM

## 2021-11-01 DIAGNOSIS — Z13.820 ENCOUNTER FOR OSTEOPOROSIS SCREENING IN ASYMPTOMATIC POSTMENOPAUSAL PATIENT: ICD-10-CM

## 2021-11-01 DIAGNOSIS — Z78.0 ENCOUNTER FOR OSTEOPOROSIS SCREENING IN ASYMPTOMATIC POSTMENOPAUSAL PATIENT: ICD-10-CM

## 2021-11-01 DIAGNOSIS — Z12.31 ENCOUNTER FOR SCREENING MAMMOGRAM FOR MALIGNANT NEOPLASM OF BREAST: ICD-10-CM

## 2021-11-01 PROCEDURE — 77066 DX MAMMO INCL CAD BI: CPT

## 2021-11-01 PROCEDURE — G0279 TOMOSYNTHESIS, MAMMO: HCPCS

## 2021-11-01 PROCEDURE — 77080 DXA BONE DENSITY AXIAL: CPT

## 2021-11-05 ENCOUNTER — TRANSCRIBE ORDERS (OUTPATIENT)
Dept: ADMINISTRATIVE | Facility: HOSPITAL | Age: 66
End: 2021-11-05

## 2021-11-05 DIAGNOSIS — Z85.3 PERSONAL HISTORY OF MALIGNANT NEOPLASM OF BREAST: Primary | ICD-10-CM

## 2022-02-28 ENCOUNTER — TELEPHONE (OUTPATIENT)
Dept: ONCOLOGY | Facility: CLINIC | Age: 67
End: 2022-02-28

## 2022-02-28 NOTE — TELEPHONE ENCOUNTER
Caller: Adriana Samuels    Relationship: Self    Best call back number: [phone]    What is the best time to reach you: ANY    Who are you requesting to speak with (clinical staff, provider,  specific staff member): CLINICAL      What was the call regarding: PATIENT NEEDS A LETTER TO EXCUSE HER FOR JURY DUTY AS SOON AS POSSIBLE. PATIENT WILL COME BY AND PICK IT UP, PLEASE CALL HER WHEN ITS READY     Do you require a callback: YES

## 2022-02-28 NOTE — TELEPHONE ENCOUNTER
Notified pt to contact pcp for Jury Duty excuse as she is not on active chemotherapy tx. She v/u

## 2022-04-07 ENCOUNTER — LAB (OUTPATIENT)
Dept: LAB | Facility: HOSPITAL | Age: 67
End: 2022-04-07

## 2022-04-07 DIAGNOSIS — M85.88 OSTEOPENIA OF LUMBAR SPINE: ICD-10-CM

## 2022-04-07 DIAGNOSIS — C50.411 MALIGNANT NEOPLASM OF UPPER-OUTER QUADRANT OF RIGHT BREAST IN FEMALE, ESTROGEN RECEPTOR POSITIVE: ICD-10-CM

## 2022-04-07 DIAGNOSIS — Z17.0 MALIGNANT NEOPLASM OF UPPER-OUTER QUADRANT OF RIGHT BREAST IN FEMALE, ESTROGEN RECEPTOR POSITIVE: ICD-10-CM

## 2022-04-07 LAB
ALBUMIN SERPL-MCNC: 4.6 G/DL (ref 3.5–5.2)
ALBUMIN/GLOB SERPL: 2.1 G/DL
ALP SERPL-CCNC: 79 U/L (ref 39–117)
ALT SERPL W P-5'-P-CCNC: 11 U/L (ref 1–33)
ANION GAP SERPL CALCULATED.3IONS-SCNC: 7 MMOL/L (ref 5–15)
AST SERPL-CCNC: 17 U/L (ref 1–32)
BASOPHILS # BLD AUTO: 0.04 10*3/MM3 (ref 0–0.2)
BASOPHILS NFR BLD AUTO: 1 % (ref 0–1.5)
BILIRUB SERPL-MCNC: 0.5 MG/DL (ref 0–1.2)
BUN SERPL-MCNC: 14 MG/DL (ref 8–23)
BUN/CREAT SERPL: 20.9 (ref 7–25)
CALCIUM SPEC-SCNC: 9.4 MG/DL (ref 8.6–10.5)
CEA SERPL-MCNC: 1.67 NG/ML
CHLORIDE SERPL-SCNC: 105 MMOL/L (ref 98–107)
CO2 SERPL-SCNC: 30 MMOL/L (ref 22–29)
CREAT SERPL-MCNC: 0.67 MG/DL (ref 0.57–1)
DEPRECATED RDW RBC AUTO: 43 FL (ref 37–54)
EGFRCR SERPLBLD CKD-EPI 2021: 95.9 ML/MIN/1.73
EOSINOPHIL # BLD AUTO: 0.12 10*3/MM3 (ref 0–0.4)
EOSINOPHIL NFR BLD AUTO: 3 % (ref 0.3–6.2)
ERYTHROCYTE [DISTWIDTH] IN BLOOD BY AUTOMATED COUNT: 13.2 % (ref 12.3–15.4)
GLOBULIN UR ELPH-MCNC: 2.2 GM/DL
GLUCOSE SERPL-MCNC: 91 MG/DL (ref 65–99)
HCT VFR BLD AUTO: 43.9 % (ref 34–46.6)
HGB BLD-MCNC: 13.9 G/DL (ref 12–15.9)
IMM GRANULOCYTES # BLD AUTO: 0 10*3/MM3 (ref 0–0.05)
IMM GRANULOCYTES NFR BLD AUTO: 0 % (ref 0–0.5)
LYMPHOCYTES # BLD AUTO: 0.94 10*3/MM3 (ref 0.7–3.1)
LYMPHOCYTES NFR BLD AUTO: 23.9 % (ref 19.6–45.3)
MCH RBC QN AUTO: 28 PG (ref 26.6–33)
MCHC RBC AUTO-ENTMCNC: 31.7 G/DL (ref 31.5–35.7)
MCV RBC AUTO: 88.3 FL (ref 79–97)
MONOCYTES # BLD AUTO: 0.42 10*3/MM3 (ref 0.1–0.9)
MONOCYTES NFR BLD AUTO: 10.7 % (ref 5–12)
NEUTROPHILS NFR BLD AUTO: 2.42 10*3/MM3 (ref 1.7–7)
NEUTROPHILS NFR BLD AUTO: 61.4 % (ref 42.7–76)
NRBC BLD AUTO-RTO: 0 /100 WBC (ref 0–0.2)
PLATELET # BLD AUTO: 205 10*3/MM3 (ref 140–450)
PMV BLD AUTO: 9.5 FL (ref 6–12)
POTASSIUM SERPL-SCNC: 4.6 MMOL/L (ref 3.5–5.2)
PROT SERPL-MCNC: 6.8 G/DL (ref 6–8.5)
RBC # BLD AUTO: 4.97 10*6/MM3 (ref 3.77–5.28)
SODIUM SERPL-SCNC: 142 MMOL/L (ref 136–145)
WBC NRBC COR # BLD: 3.94 10*3/MM3 (ref 3.4–10.8)

## 2022-04-07 PROCEDURE — 86300 IMMUNOASSAY TUMOR CA 15-3: CPT

## 2022-04-07 PROCEDURE — 36415 COLL VENOUS BLD VENIPUNCTURE: CPT

## 2022-04-07 PROCEDURE — 82306 VITAMIN D 25 HYDROXY: CPT

## 2022-04-07 PROCEDURE — 85025 COMPLETE CBC W/AUTO DIFF WBC: CPT

## 2022-04-07 PROCEDURE — 82378 CARCINOEMBRYONIC ANTIGEN: CPT

## 2022-04-07 PROCEDURE — 80053 COMPREHEN METABOLIC PANEL: CPT

## 2022-04-08 LAB — CANCER AG27-29 SERPL-ACNC: 13.8 U/ML (ref 0–38.6)

## 2022-04-09 LAB — 25(OH)D3 SERPL-MCNC: 54.3 NG/ML (ref 30–100)

## 2022-04-09 NOTE — PROGRESS NOTES
Clinic Progress Note    Patient:  Adriana Samuels  YOB: 1955  Date of Service: 4/15/2022  MRN: [id number]   Acct:    Primary Care Physician: Arie Renteria MD    TELEVISIT    Chief Complaint: Stage IIA right breast cancer.     Ms. Adriana Samuels is a pleasant 66 y.o.  female, post menopausal, who is seen on followup for Stage IIA right breast cancer. She is s/p 4 cycles of adjuvant Taxotere and Cytoxan completed on 11/27/201. She declined adjuvant Femara.    Hematology History:  Oncology/Hematology History Overview Note   IAGNOSTIC ABNORMALITIES:   The patient had a mammogram from [facility] that showed a lesion 5.6 mm at the 10 o'clock position also noted on ultrasound suspicious for malignancy. The patient was referred for nodule in the right upper outer breast.  The patient was seen by Dr. Lawrence 09/05/2018.  Examination showed small palpable abnormality at 10 and 11 o'clock positions, 0.5 cm to 1 cm in size. No adenopathy noted. Patient planned for excision and sentinel node evaluation.  Pathology report 09/11/2018: Invasive ductal carcinoma intermediate grade, 1 out of 2 sentinel lymph nodes positive for metastasis. The largest metastatic focus is 2.5 mm. Negative for extracapsular extension. Tumor measures 9 mm in greatest linear dimension. Margins of resection are negative. Tumor measures 2 mm from closest anterior margin. AJCC T1b, N1a. ER/IL positive and HER-2/gregory negative.  CT abdomen and pelvis performed at [facility] on 09/21/2018 was revealing for IMPRESSION: 1. Post therapy changes right breast and axilla. 2. Linear nodularity left lung base, suspect chronic changes. 3. Otherwise, No CT evidence of definite metastatic disease in chest or acute cardiopulmonary process.   Bone scan performed at [facility] on 09/21/2018 was revealing for IMPRESSION: 1. Focal increased tracer activity identified in the left lateral mid-cervical spine, suspect degenerative changes.  Plain radiographic imaging could further characterize. 2. Otherwise, No scintigraphic evidence of osseous metastases.   Ultrasound of liver performed at [facility], [place], operated by [facility] on 09/26/2018 was revealing for Summary:  Multiple hepatic cysts.      PREVIOUS INTERVENTIONS:   The patient had undergone right breast lumpectomy with sentinel lymph node biopsy 09/10/2018 by Dr. Lawrence.  Adjuvant Taxotere and Cytoxan 09/25/2018 through 11/27/2018 at the [facility]. 4 cycles.   Adjuvant radiation completed 02/2019 by Dr. Williamson.  Adjuvant Femara 2.5 mg po daily, she declined.            Malignant neoplasm of upper-outer quadrant of right female breast (HCC)    Initial Diagnosis    Malignant neoplasm of right female breast (CMS/HCC)     9/10/2018 Surgery    Final Diagnosis   1.  Breast, right, lumpectomy and sentinel lymph node resection:  Invasive ductal carcinoma, intermediate grade  Tumor measures 9 mm in greatest linear dimension  Margins of resection are negative  Tumor measures 2 mm from the closest, anterior, margin.     Positive for metastatic ductal carcinoma in one out of 2 sentinel lymph nodes (1/2)  The largest metastatic focus is 2.5 mm  Negative for extra capsular extension     AJCC cancer stage: pT1b, (sn)pN1a            9/21/2018 Imaging    Ct Chest With Contrast  1. Post therapy changes right breast and axilla.   2. Linear nodularity left lung base, suspect chronic changes.   3. Otherwise, No CT evidence of definite metastatic disease in chest or acute cardiopulmonary process.     Nm Bone Scan Whole Body  1. Focal increased tracer activity identified in the left lateral mid cervical spine, suspect degenerative changes. Plain radiographic imaging could further characterize.   2. Otherwise, No scintigraphic evidence of osseous metastases.     Ct Abdomen Pelvis With Contrast  1. Multiple low-attenuation hepatic lesions, hepatic cyst most likely.   2. Suspect uterine fibroid   3. Otherwise, No CT evidence of  metastatic disease or acute intra-abdominal/pelvic pathological process.      9/25/2018 - 11/27/2018 Chemotherapy    Taxotere Cytoxan     1/15/2019 - 2/25/2019 Radiation    Radiation OncologyTreatment Course:  Adriana Samuels received 6000 cGy in 30 fractions to right breast, right supraclavicular area, and right axilla via external beam radiation therapy.     10/28/2019 Imaging    Bilateral Diagnostic Mammogram:  IMPRESSION:  Posttherapeutic changes of the right breast are stable over  a one year time period and no suspicious findings are identified. The  patient is due to return in 12 months for bilateral digital mammograms.  BI-RADS Category 2, benign.     10/29/2020 Imaging    Bilateral Diagnostic Mammogram:  IMPRESSION:  1. No mammographic evidence of malignancy.  2. BI-RADS CATEGORY 2: Benign findings.  3. Recommend annual screening mammography.       Interval History:  Ms. Samuels is here for her scheduled follow-up.  In the interim from last visit, she has been doing fairly well.  She feels well today and has no complaints.  She denies having any fever, chills, chest pain, shortness of breath, abdominal pain, nausea or vomiting, change in bowel or bladder habits, weakness or numbness of the legs.    Objectives:  Vitals:    04/15/22 0802   Pulse: 98   Resp: 16   Temp: 97.5 °F (36.4 °C)   SpO2: 98%     ECOG Performance Status: 0  GENERAL: Alert and oriented, no apparent distress  HEENT: No scleral icterus  NECK: Supple  EXTREMITIES: Symmetric  SKIN: No jaundice  PSYCHIATRIC: Full affect  NEUROLOGIC: Stable gait    Results:  Lab Results   Component Value Date    WBC 3.94 04/07/2022    HGB 13.9 04/07/2022    HCT 43.9 04/07/2022    MCV 88.3 04/07/2022     04/07/2022     Lab Results   Component Value Date    GLUCOSE 91 04/07/2022    BUN 14 04/07/2022    CREATININE 0.67 04/07/2022    EGFRIFNONA 89 10/07/2021    BCR 20.9 04/07/2022    K 4.6 04/07/2022    CO2 30.0 (H) 04/07/2022    CALCIUM 9.4 04/07/2022     ALBUMIN 4.60 04/07/2022    AST 17 04/07/2022    ALT 11 04/07/2022     Lab Results   Component Value Date    CEA 1.67 04/07/2022     CA 27.29 13.8    Vitamin D 54    Mammography 10/11/2021, impression:  1. Benign mammogram. BI-RADS 2.  2. Screening mammography recommended in one year.  3. Computer aided detection was utilized. 3-D Tomosynthesis was  performed.    DEXA 10/11/2021, impression:  1. Osteopenia. Low bone mass. The bone density is between 1.0 and 2.5 standard deviations below the mean for a young adult female. Fracture risk is moderate.  2. Bone mineral density is decreased in the lumbar spine when compared to the prior study.    Assessment :  1. Carcinoma of the right upper outer breast. Invasive ductal carcinoma:  Current Stage: AJCC IIA (pT1b, pN1a, M0, G2) Tumor size 9 mm.  Hormone Status: %, IL 86%, and negative HER-2/gregory.  Baseline Node Status: 1/2 sentinel node positive for metastasis.   Complications of Tumor: None.   Treatment status: Post adjuvant Taxotere and Cytoxan. Post adjuvant radiation 02/2019. Declined adjuvant Femara.   Prognosis: Fair.  2. Performance status of 0.   3. Self directed care.   4. Osteopenia. Taking Caltrate D.  5. Internal hemorrhoids.   6. Elevated CEA, history of.    7. Rash from Decadron.  8. Grade 1 nausea from chemo, resolved.  9. Grade 1 fatigue from chemo, resolved.     Plan:   - No signs of cancer recurrence per exam or labs.  - The patient declined to receive adjuvant endocrine treatment before, and she had researched Femara and felt that this could be worsening her bone health which she did not want to deal with. I did discuss treatment options with her again today, including AI versus tamoxifen, and I discussed with her that tamoxifen would be a reasonable choice as she is fit and active, as it improves bone health, and we also discussed possible adverse events of flashes, body aches, vaginitis, in addition to increasing risk of DVT and uterine overgrowth.  She still did not want to commit to it and she will research it.  - She will be seen every 4 months for the first 2 years then every 6 months for the next 3 years. Annual mammogram.   - DEXA scan shows osteopenia, but bone density is decreased from previous scan from 4/2019; I discussed with patient treatment options: To continue to monitor versus to considering a bone strengthening medication; she elected to continue monitoring. Next DEXA would be due in 2023.  - Her pre-office Vitamin D level is 54.  - Note from Dr. Hughes 04/28/2021: No evidence of recurrence; scheduled to follow-up in 1 year.  - Note from Dr. Lawrence 11/5/2021 noted; No evidence of recurrence; scheduled to follow-up in 1 year.  - RTC in 6 months with pre-office CBC with differential, CMP, CA27-29, CEA and vitamin D.     Inge Singh MD  4/15/2022

## 2022-04-15 ENCOUNTER — OFFICE VISIT (OUTPATIENT)
Dept: ONCOLOGY | Facility: CLINIC | Age: 67
End: 2022-04-15

## 2022-04-15 VITALS
OXYGEN SATURATION: 98 % | RESPIRATION RATE: 16 BRPM | TEMPERATURE: 97.5 F | BODY MASS INDEX: 23.66 KG/M2 | HEIGHT: 66 IN | WEIGHT: 147.2 LBS | HEART RATE: 98 BPM

## 2022-04-15 DIAGNOSIS — M85.88 OSTEOPENIA OF LUMBAR SPINE: ICD-10-CM

## 2022-04-15 DIAGNOSIS — Z17.0 MALIGNANT NEOPLASM OF UPPER-OUTER QUADRANT OF RIGHT BREAST IN FEMALE, ESTROGEN RECEPTOR POSITIVE: Primary | ICD-10-CM

## 2022-04-15 DIAGNOSIS — C50.411 MALIGNANT NEOPLASM OF UPPER-OUTER QUADRANT OF RIGHT BREAST IN FEMALE, ESTROGEN RECEPTOR POSITIVE: Primary | ICD-10-CM

## 2022-04-15 PROCEDURE — 99214 OFFICE O/P EST MOD 30 MIN: CPT | Performed by: INTERNAL MEDICINE

## 2022-05-02 ENCOUNTER — HOSPITAL ENCOUNTER (OUTPATIENT)
Dept: RADIATION ONCOLOGY | Facility: HOSPITAL | Age: 67
Setting detail: RADIATION/ONCOLOGY SERIES
End: 2022-05-02

## 2022-05-02 ENCOUNTER — OFFICE VISIT (OUTPATIENT)
Dept: RADIATION ONCOLOGY | Facility: HOSPITAL | Age: 67
End: 2022-05-02

## 2022-05-02 VITALS
OXYGEN SATURATION: 98 % | WEIGHT: 145 LBS | SYSTOLIC BLOOD PRESSURE: 121 MMHG | HEIGHT: 66 IN | DIASTOLIC BLOOD PRESSURE: 75 MMHG | BODY MASS INDEX: 23.3 KG/M2 | HEART RATE: 89 BPM | RESPIRATION RATE: 18 BRPM

## 2022-05-02 DIAGNOSIS — Z17.0 MALIGNANT NEOPLASM OF UPPER-OUTER QUADRANT OF RIGHT BREAST IN FEMALE, ESTROGEN RECEPTOR POSITIVE: Primary | ICD-10-CM

## 2022-05-02 DIAGNOSIS — Z78.9 NON-SMOKER: ICD-10-CM

## 2022-05-02 DIAGNOSIS — C50.411 MALIGNANT NEOPLASM OF UPPER-OUTER QUADRANT OF RIGHT BREAST IN FEMALE, ESTROGEN RECEPTOR POSITIVE: Primary | ICD-10-CM

## 2022-05-02 DIAGNOSIS — Z79.811 PROPHYLACTIC USE OF LETROZOLE (FEMARA): ICD-10-CM

## 2022-05-02 DIAGNOSIS — Z92.3 HISTORY OF RADIATION THERAPY: ICD-10-CM

## 2022-05-02 DIAGNOSIS — Z98.890 S/P LUMPECTOMY, RIGHT BREAST: ICD-10-CM

## 2022-05-02 DIAGNOSIS — Z98.890 S/P LYMPH NODE BIOPSY: ICD-10-CM

## 2022-05-02 PROCEDURE — G0463 HOSPITAL OUTPT CLINIC VISIT: HCPCS | Performed by: RADIOLOGY

## 2022-10-06 ENCOUNTER — LAB (OUTPATIENT)
Dept: LAB | Facility: HOSPITAL | Age: 67
End: 2022-10-06

## 2022-10-06 DIAGNOSIS — M85.88 OSTEOPENIA OF LUMBAR SPINE: ICD-10-CM

## 2022-10-06 DIAGNOSIS — C50.411 MALIGNANT NEOPLASM OF UPPER-OUTER QUADRANT OF RIGHT BREAST IN FEMALE, ESTROGEN RECEPTOR POSITIVE: ICD-10-CM

## 2022-10-06 DIAGNOSIS — Z17.0 MALIGNANT NEOPLASM OF UPPER-OUTER QUADRANT OF RIGHT BREAST IN FEMALE, ESTROGEN RECEPTOR POSITIVE: ICD-10-CM

## 2022-10-06 LAB
25(OH)D3 SERPL-MCNC: 69.8 NG/ML (ref 30–100)
ALBUMIN SERPL-MCNC: 4.4 G/DL (ref 3.5–5.2)
ALBUMIN/GLOB SERPL: 1.4 G/DL
ALP SERPL-CCNC: 79 U/L (ref 39–117)
ALT SERPL W P-5'-P-CCNC: 13 U/L (ref 1–33)
ANION GAP SERPL CALCULATED.3IONS-SCNC: 9 MMOL/L (ref 5–15)
AST SERPL-CCNC: 16 U/L (ref 1–32)
BASOPHILS # BLD AUTO: 0.04 10*3/MM3 (ref 0–0.2)
BASOPHILS NFR BLD AUTO: 1 % (ref 0–1.5)
BILIRUB SERPL-MCNC: 0.6 MG/DL (ref 0–1.2)
BUN SERPL-MCNC: 13 MG/DL (ref 8–23)
BUN/CREAT SERPL: 17.6 (ref 7–25)
CALCIUM SPEC-SCNC: 9.7 MG/DL (ref 8.6–10.5)
CEA SERPL-MCNC: 1.83 NG/ML
CHLORIDE SERPL-SCNC: 103 MMOL/L (ref 98–107)
CO2 SERPL-SCNC: 32 MMOL/L (ref 22–29)
CREAT SERPL-MCNC: 0.74 MG/DL (ref 0.57–1)
DEPRECATED RDW RBC AUTO: 43.2 FL (ref 37–54)
EGFRCR SERPLBLD CKD-EPI 2021: 88.3 ML/MIN/1.73
EOSINOPHIL # BLD AUTO: 0.19 10*3/MM3 (ref 0–0.4)
EOSINOPHIL NFR BLD AUTO: 4.8 % (ref 0.3–6.2)
ERYTHROCYTE [DISTWIDTH] IN BLOOD BY AUTOMATED COUNT: 13.1 % (ref 12.3–15.4)
GLOBULIN UR ELPH-MCNC: 3.1 GM/DL
GLUCOSE SERPL-MCNC: 67 MG/DL (ref 65–99)
HCT VFR BLD AUTO: 46 % (ref 34–46.6)
HGB BLD-MCNC: 14 G/DL (ref 12–15.9)
IMM GRANULOCYTES # BLD AUTO: 0.01 10*3/MM3 (ref 0–0.05)
IMM GRANULOCYTES NFR BLD AUTO: 0.3 % (ref 0–0.5)
LYMPHOCYTES # BLD AUTO: 1.28 10*3/MM3 (ref 0.7–3.1)
LYMPHOCYTES NFR BLD AUTO: 32.3 % (ref 19.6–45.3)
MCH RBC QN AUTO: 27.3 PG (ref 26.6–33)
MCHC RBC AUTO-ENTMCNC: 30.4 G/DL (ref 31.5–35.7)
MCV RBC AUTO: 89.7 FL (ref 79–97)
MONOCYTES # BLD AUTO: 0.35 10*3/MM3 (ref 0.1–0.9)
MONOCYTES NFR BLD AUTO: 8.8 % (ref 5–12)
NEUTROPHILS NFR BLD AUTO: 2.09 10*3/MM3 (ref 1.7–7)
NEUTROPHILS NFR BLD AUTO: 52.8 % (ref 42.7–76)
NRBC BLD AUTO-RTO: 0 /100 WBC (ref 0–0.2)
PLATELET # BLD AUTO: 253 10*3/MM3 (ref 140–450)
PMV BLD AUTO: 10 FL (ref 6–12)
POTASSIUM SERPL-SCNC: 4.7 MMOL/L (ref 3.5–5.2)
PROT SERPL-MCNC: 7.5 G/DL (ref 6–8.5)
RBC # BLD AUTO: 5.13 10*6/MM3 (ref 3.77–5.28)
SODIUM SERPL-SCNC: 144 MMOL/L (ref 136–145)
WBC NRBC COR # BLD: 3.96 10*3/MM3 (ref 3.4–10.8)

## 2022-10-06 PROCEDURE — 80053 COMPREHEN METABOLIC PANEL: CPT

## 2022-10-06 PROCEDURE — 36415 COLL VENOUS BLD VENIPUNCTURE: CPT

## 2022-10-06 PROCEDURE — 85025 COMPLETE CBC W/AUTO DIFF WBC: CPT

## 2022-10-06 PROCEDURE — 82306 VITAMIN D 25 HYDROXY: CPT

## 2022-10-06 PROCEDURE — 82378 CARCINOEMBRYONIC ANTIGEN: CPT

## 2022-10-06 PROCEDURE — 86300 IMMUNOASSAY TUMOR CA 15-3: CPT

## 2022-10-07 LAB — CANCER AG27-29 SERPL-ACNC: 15.3 U/ML (ref 0–38.6)

## 2022-10-17 ENCOUNTER — OFFICE VISIT (OUTPATIENT)
Dept: ONCOLOGY | Facility: CLINIC | Age: 67
End: 2022-10-17

## 2022-10-17 VITALS
HEART RATE: 76 BPM | RESPIRATION RATE: 16 BRPM | TEMPERATURE: 97.7 F | SYSTOLIC BLOOD PRESSURE: 116 MMHG | DIASTOLIC BLOOD PRESSURE: 72 MMHG | BODY MASS INDEX: 22.97 KG/M2 | OXYGEN SATURATION: 100 % | WEIGHT: 142.9 LBS | HEIGHT: 66 IN

## 2022-10-17 DIAGNOSIS — C50.411 MALIGNANT NEOPLASM OF UPPER-OUTER QUADRANT OF RIGHT BREAST IN FEMALE, ESTROGEN RECEPTOR POSITIVE: Primary | ICD-10-CM

## 2022-10-17 DIAGNOSIS — M85.88 OSTEOPENIA OF LUMBAR SPINE: ICD-10-CM

## 2022-10-17 DIAGNOSIS — Z85.3 HISTORY OF BREAST CANCER: Primary | ICD-10-CM

## 2022-10-17 DIAGNOSIS — Z17.0 MALIGNANT NEOPLASM OF UPPER-OUTER QUADRANT OF RIGHT BREAST IN FEMALE, ESTROGEN RECEPTOR POSITIVE: Primary | ICD-10-CM

## 2022-10-17 PROCEDURE — 99214 OFFICE O/P EST MOD 30 MIN: CPT | Performed by: NURSE PRACTITIONER

## 2022-10-17 NOTE — PROGRESS NOTES
[facility] HEMATOLOGY & ONCOLOGY [place] [id number]  [place] [address] [phone]    Patient Name: Adriana Samuels  Encounter Date: 10/17/2022   YOB: 1955  Patient Number: [id number]    Hematology / Oncology Progress Note      HPI / REASON FOR VISIT: Adriana Samuels is a 67 y.o. female who is followed by this office for history of Stage IIA right breast cancer.  She was treated with right breast lumpectomy and sentinel lymph node biopsy, 4 cycles of adjuvant Taxotere and Cytoxan which was completed on November 27, 2018 and adjuvant radiation which was completed in February 2019.  She was advised adjuvant Femara 2.5 mg p.o. daily, however, patient declined.      Her last mammogram was November 1, 2021.  Findings included The parenchymal pattern is stable. There are no dominant masses or suspicious microcalcifications. Skin thickening on the right appears stable. There are few scattered benign calcifications. There are no suspicious clusters. Vascular calcification is noted.  Advised continued annual screening.      She had a DEXA scan on November 1, 2021 which showed osteopenia of the lumbar spine with T score of -2.  Bone mineral density in the lumbar spine was decreased when compared to previous study.  Dr. Singh discussed options with patient including continuing to monitor versus bone strengthening medication and patient elected to continue to monitor.    She presents to clinic today for continued follow up.  She has no acute complaints today.     She had labs drawn on October 6, 2022.  Results were reviewed with patient today.  Chemistry unremarkable other than CO2 of 32.  Vitamin D69.8, CBC with WBC 3.96, hemoglobin 14.0, hematocrit 46.0, platelets 253.  CA 27-29 is normal at 15.3.  CEA 1.83.      LABS    Lab Results - Last 18 Months   Lab Units 10/06/22  0802 04/07/22  0803 10/07/21  0809   HEMOGLOBIN g/dL 14.0 13.9 13.8    HEMATOCRIT % 46.0 43.9 42.9   MCV fL 89.7 88.3 87.9   WBC 10*3/mm3 3.96 3.94 4.31   RDW % 13.1 13.2 13.0   MPV fL 10.0 9.5 9.6   PLATELETS 10*3/mm3 253 205 221   IMM GRAN % % 0.3 0.0  --    NEUTROS ABS 10*3/mm3 2.09 2.42 2.84   LYMPHS ABS 10*3/mm3 1.28 0.94  --    MONOS ABS 10*3/mm3 0.35 0.42  --    EOS ABS 10*3/mm3 0.19 0.12 0.13   BASOS ABS 10*3/mm3 0.04 0.04 0.04   IMMATURE GRANS (ABS) 10*3/mm3 0.01 0.00  --    NRBC /100 WBC 0.0 0.0  --    NEUTROPHIL % %  --   --  66.0   MONOCYTES % %  --   --  6.2   BASOPHIL % %  --   --  1.0   ATYP LYMPH % %  --   --  6.2*   GIANT PLT   --   --  Slight/1+       Lab Results - Last 18 Months   Lab Units 10/06/22  0802 04/07/22  0803 10/07/21  0809   GLUCOSE mg/dL 67 91 84   SODIUM mmol/L 144 142 144   POTASSIUM mmol/L 4.7 4.6 4.5   CO2 mmol/L 32.0* 30.0* 31.0*   CHLORIDE mmol/L 103 105 107   ANION GAP mmol/L 9.0 7.0 6.0   CREATININE mg/dL 0.74 0.67 0.66   BUN mg/dL 13 14 15   BUN / CREAT RATIO  17.6 20.9 22.7   CALCIUM mg/dL 9.7 9.4 9.3   EGFR IF NONAFRICN AM mL/min/1.73  --   --  89   ALK PHOS U/L 79 79 89   TOTAL PROTEIN g/dL 7.5 6.8 6.7   ALT (SGPT) U/L 13 11 11   AST (SGOT) U/L 16 17 17   BILIRUBIN mg/dL 0.6 0.5 0.4   ALBUMIN g/dL 4.40 4.60 4.40   GLOBULIN gm/dL 3.1 2.2 2.3       Lab Results - Last 18 Months   Lab Units 10/06/22  0802 04/07/22  0803 10/07/21  0809   CEA ng/mL 1.83 1.67 2.30       No results for input(s): IRON, TIBC, LABIRON, FERRITIN, H9YFWCU, TSH, FOLATE in the last 79210 hours.    Invalid input(s): VITB12      PAST MEDICAL HISTORY:  ALLERGIES:  Allergies   Allergen Reactions   • Bactrim [Sulfamethoxazole-Trimethoprim] Rash   • Cortisone Rash   • Decadron [Dexamethasone] Hives   • Hydrocortisone Hives     Oral or IV   • Erythromycin Nausea Only and Rash     CURRENT MEDICATIONS:  Outpatient Encounter Medications as of 10/17/2022   Medication Sig Dispense Refill   • aspirin 81 MG chewable tablet Chew 81 mg Daily.     • Biotin 1 MG capsule Take 1 capsule by  mouth Daily.     • Calcium Carb-Cholecalciferol (CALCIUM 1000 + D PO) Take  by mouth Daily.     • glucosamine sulfate 500 MG capsule capsule Take  by mouth 3 (Three) Times a Day With Meals.     • Multiple Vitamins-Minerals (MULTIVITAMIN WITH MINERALS) tablet tablet Take 1 tablet by mouth Daily.     • Probiotic Product (PROBIOTIC PO) Take 1 capsule by mouth Daily.     • azelastine (ASTELIN) 0.1 % nasal spray azelastine 137 mcg (0.1 %) nasal spray aerosol   PLACE 2 SPRAYS INTO EACH NOSTRIL TWICE DAILY     • Loratadine (CLARITIN PO) Take 10 mg by mouth Daily.       No facility-administered encounter medications on file as of 10/17/2022.     ADULT ILLNESSES:  Patient Active Problem List   Diagnosis Code   • Malignant neoplasm of upper-outer quadrant of right female breast (HCC) C50.411   • Non-smoker Z78.9   • S/P lumpectomy, right breast Z98.890   • S/P lymph node biopsy Z98.890   • History of radiation therapy Z92.3   • Prophylactic use of letrozole (Femara) Z79.811     SURGERIES:  Past Surgical History:   Procedure Laterality Date   • BREAST BIOPSY Right 09/2018    positive   • BREAST LUMPECTOMY Right 09/2018    lumpectomy with chemo and rad tx   • BREAST LUMPECTOMY WITH SENTINEL NODE BIOPSY Right 9/10/2018    Procedure: RIGHT BREAST LUMPECTOMY WITH SENTINEL LYMPH NODE BIOPSY, INJECTION AND SCAN;  Surgeon: Duglas Lawrence MD;  Location: [facility] OR;  Service: General   • COLONOSCOPY N/A 6/6/2019    Procedure: COLONOSCOPY WITH ANESTHESIA;  Surgeon: Brock Raman MD;  Location: [facility] ENDOSCOPY;  Service: Gastroenterology   • HYDROCELE EXCISION / REPAIR     • LASER ABLATION OF THE CERVIX     • WRIST FUSION       HEALTH MAINTENANCE ITEMS:  Health Maintenance Due   Topic Date Due   • TDAP/TD VACCINES (1 - Tdap) Never done   • ZOSTER VACCINE (1 of 2) Never done   • HEPATITIS C SCREENING  Never done   • ANNUAL WELLNESS VISIT  Never done   • Pneumococcal Vaccine 65+ (1 - PCV) Never done   • COVID-19 Vaccine (5 -  Booster for Pfizer series) 02/09/2022   • INFLUENZA VACCINE  08/01/2022       <no information>  Last Completed Colonoscopy          COLORECTAL CANCER SCREENING (COLONOSCOPY - Every 10 Years) Next due on 6/6/2029 06/06/2019  Surgical Procedure: COLONOSCOPY    06/06/2019  COLONOSCOPY              Immunization History   Administered Date(s) Administered   • COVID-19 (PFIZER) PURPLE CAP 03/25/2021, 04/15/2021, 12/15/2021     Last Completed Mammogram          Scheduled - MAMMOGRAM (Yearly) Scheduled for 11/2/2022 11/01/2021  Mammo Diagnostic Digital Tomosynthesis Bilateral With CAD    10/29/2020  Mammo Diagnostic Digital Tomosynthesis Bilateral With CAD    10/28/2019  Mammo Diagnostic Digital Tomosynthesis Bilateral With CAD    04/26/2019  Mammo Diagnostic Digital Tomosynthesis Right With CAD    08/23/2018  Outside Claim: [facility] MAMMOGRAM SCREENING BILAT DIGITAL W CAD,[facility] MAMMOGRAM DIAGNOSTIC UNILAT DIGITAL W CAD,NE TOMOSYNTHESIS MAMMOGRAPHY                  FAMILY HISTORY:  Family History   Problem Relation Age of Onset   • No Known Problems Mother    • Skin cancer Father    • No Known Problems Sister    • No Known Problems Brother    • No Known Problems Sister    • No Known Problems Brother    • No Known Problems Son    • No Known Problems Son    • No Known Problems Son    • No Known Problems Daughter    • No Known Problems Maternal Grandmother    • No Known Problems Paternal Grandmother    • No Known Problems Maternal Aunt    • No Known Problems Paternal Aunt    • No Known Problems Other    • Breast cancer Neg Hx    • Colon cancer Neg Hx    • Colon polyps Neg Hx    • BRCA 1/2 Neg Hx    • Endometrial cancer Neg Hx    • Ovarian cancer Neg Hx      SOCIAL HISTORY:  Social History     Socioeconomic History   • Marital status:    Tobacco Use   • Smoking status: Never   • Smokeless tobacco: Never   Substance and Sexual Activity   • Alcohol use: Yes     Comment: RARELY   • Drug use: No   • Sexual activity: Defer  "      REVIEW OF SYSTEMS:  Review of Systems   Constitutional: Negative for fatigue and fever.   HENT: Negative for trouble swallowing.    Respiratory: Negative for cough and shortness of breath.    Cardiovascular: Negative for chest pain, palpitations and leg swelling.   Gastrointestinal: Negative for nausea and vomiting.   Genitourinary: Negative for hematuria.   Musculoskeletal: Negative for arthralgias and myalgias.   Skin: Negative for rash, skin lesions and wound.   Neurological: Negative for dizziness, syncope, memory problem and confusion.   Psychiatric/Behavioral: Negative for suicidal ideas and depressed mood. The patient is not nervous/anxious.        /72   Pulse 76   Temp 97.7 °F (36.5 °C)   Resp 16   Ht 167.6 cm (66\")   Wt 64.8 kg (142 lb 14.4 oz)   SpO2 100%   BMI 23.06 kg/m²  Body surface area is 1.73 meters squared.    Pain Score    10/17/22 0905   PainSc: 0-No pain       Physical Exam:  Physical Exam  Constitutional:       Appearance: Normal appearance.   HENT:      Head: Normocephalic and atraumatic.   Cardiovascular:      Rate and Rhythm: Normal rate and regular rhythm.   Pulmonary:      Effort: Pulmonary effort is normal.      Breath sounds: Normal breath sounds.   Chest:      Comments: Lumpectomy changes to right breast. Radiation skin changes under right breast.      Left breast feels fibrous/dense.      Abdominal:      General: Bowel sounds are normal.      Palpations: Abdomen is soft.   Musculoskeletal:      Right lower leg: No edema.      Left lower leg: No edema.   Skin:     General: Skin is warm and dry.   Neurological:      Mental Status: She is alert and oriented to person, place, and time.   Psychiatric:         Attention and Perception: Attention normal.         Mood and Affect: Mood normal.         Judgment: Judgment normal.         Adriana M Arvind reports a pain score of 0.  Given her pain assessment as noted, treatment options were discussed and the following options were " decided upon as a follow-up plan to address the patient's pain: No intervention indicated.          ASSESSMENT / PLAN    1. History of breast cancer    2. Osteopenia of lumbar spine         1.  History of Breast Cancer   Invasive ductal carcinoma: Stage: AJCC IIA (pT1b, pN1a, M0, G2) Tumor size 9 mm.  Hormone Status: %, PA 86%, and negative HER-2/gregory.  Baseline Node Status: 1/2 sentinel node positive for metastasis.   Complications of Tumor: None.   Treatment status: Lumpectomy, Post adjuvant Taxotere and Cytoxan. Post adjuvant radiation 02/2019.   Declined adjuvant Femara.     -Mammogram November 1, 2021 with benign findings.  Advised annual screenings.    -Heme status stable with Hemoglobin 14.0, hematocrit 46.0, platelets 253.  -CA 27-29 is normal at 15.3.  CEA normal at 1.83.  -Scheduled for Mammogram November 2, 2022    2.  Osteopenia   -DEXA Scan 11/1/21 with lumbar spine with T score of -2.    -Pt declined bone strengthening medication  -Taking Calcium Vit D  -Repeat DEXA Scan in 2023   -Vitamin D69.8, Calcium 9.7    PLAN:  Stable for observation.    Continue annual mammograms.  Return to office in 6 months  Patient will have preoffice labs for CBC with differential, CMP, CA27-29, CEA and vitamin D.   Continue current medications, treatment plans and follow up with PCP and any other providers.  Care discussed with patient.  Understanding expressed.  Patient agreeable with plan.        ACP discussion was held with the patient during this visit. Patient has an advance directive (not in EMR), copy requested.      Karen Tineo, APRN  10/17/2022

## 2022-11-02 ENCOUNTER — HOSPITAL ENCOUNTER (OUTPATIENT)
Dept: MAMMOGRAPHY | Facility: HOSPITAL | Age: 67
Discharge: HOME | End: 2022-11-02
Admitting: SPECIALIST

## 2022-11-02 PROCEDURE — G0279 TOMOSYNTHESIS, MAMMO: HCPCS

## 2022-11-02 PROCEDURE — 77066 DX MAMMO INCL CAD BI: CPT

## 2022-11-09 ENCOUNTER — OFFICE VISIT (OUTPATIENT)
Dept: SURGERY | Facility: CLINIC | Age: 67
End: 2022-11-09

## 2022-11-09 VITALS
BODY MASS INDEX: 22.96 KG/M2 | WEIGHT: 142.86 LBS | DIASTOLIC BLOOD PRESSURE: 80 MMHG | SYSTOLIC BLOOD PRESSURE: 141 MMHG | HEART RATE: 80 BPM | TEMPERATURE: 97.6 F | HEIGHT: 66 IN

## 2022-11-09 DIAGNOSIS — C50.411 MALIGNANT NEOPLASM OF UPPER-OUTER QUADRANT OF RIGHT FEMALE BREAST, UNSPECIFIED ESTROGEN RECEPTOR STATUS: ICD-10-CM

## 2022-11-09 DIAGNOSIS — Z79.811 PROPHYLACTIC USE OF LETROZOLE (FEMARA): ICD-10-CM

## 2022-11-09 DIAGNOSIS — Z12.31 SCREENING MAMMOGRAM FOR HIGH-RISK PATIENT: ICD-10-CM

## 2022-11-09 DIAGNOSIS — Z98.890 S/P LUMPECTOMY, RIGHT BREAST: Primary | ICD-10-CM

## 2022-11-09 DIAGNOSIS — Z92.3 HISTORY OF RADIATION THERAPY: ICD-10-CM

## 2022-11-09 DIAGNOSIS — Z98.890 S/P LYMPH NODE BIOPSY: ICD-10-CM

## 2022-11-09 PROCEDURE — 99213 OFFICE O/P EST LOW 20 MIN: CPT | Performed by: SPECIALIST

## 2022-11-09 NOTE — PATIENT INSTRUCTIONS
Mrs. Samuels I think everything looks great 4 years out from surgery in October 2018 no abnormalities on your mammogram and no abnormalities on your breast exam continue your self breast examination follow-up with me in 1 year after mammogram.

## 2022-11-09 NOTE — PROGRESS NOTES
Office Established Patient Note:     Referring Provider: Dr. Jeff Rinney  Chief complaint follow-up right breast lumpectomy 2018    Subjective .     History of present illness:  Adriana Samuels is a 67 y.o. female currently 4 years out from a right upper outer breast lumpectomy and sentinel lymph node evaluation in October 2018.  She is 4 years out from surgery she has had no problems she has some changes from radiation with hypervascularization in the inferior aspect of the breast and anterior chest wall as well as dimpling in the central aspect of the lumpectomy site but otherwise she is doing well she had a very minimal tumor with 1 sentinel lymph node that was positive.  She received chemotherapy and radiation is done well.  She he had a T1N1 tumor.    History  Past Medical History:   Diagnosis Date   • Breast cancer (HCC) 09/2018    right breast   • Cancer (HCC)    • Drug therapy 09/2018-11/2018   • Herpes zoster    • Hx of radiation therapy 02/2019    right breast   • Menorrhagia    • Mononucleosis    • PONV (postoperative nausea and vomiting)    • Shingles    ,   Past Surgical History:   Procedure Laterality Date   • BREAST BIOPSY Right 09/2018    positive   • BREAST LUMPECTOMY Right 09/2018    lumpectomy with chemo and rad tx   • BREAST LUMPECTOMY WITH SENTINEL NODE BIOPSY Right 9/10/2018    Procedure: RIGHT BREAST LUMPECTOMY WITH SENTINEL LYMPH NODE BIOPSY, INJECTION AND SCAN;  Surgeon: Duglas Lawrence MD;  Location: [facility] OR;  Service: General   • COLONOSCOPY N/A 6/6/2019    Procedure: COLONOSCOPY WITH ANESTHESIA;  Surgeon: Brock Raman MD;  Location: [facility] ENDOSCOPY;  Service: Gastroenterology   • HYDROCELE EXCISION / REPAIR     • LASER ABLATION OF THE CERVIX     • WRIST FUSION     ,   Family History   Problem Relation Age of Onset   • No Known Problems Mother    • Skin cancer Father    • No Known Problems Sister    • No Known Problems Brother    • No Known Problems Sister    • No Known Problems  "Brother    • No Known Problems Son    • No Known Problems Son    • No Known Problems Son    • No Known Problems Daughter    • No Known Problems Maternal Grandmother    • No Known Problems Paternal Grandmother    • No Known Problems Maternal Aunt    • No Known Problems Paternal Aunt    • No Known Problems Other    • Breast cancer Neg Hx    • Colon cancer Neg Hx    • Colon polyps Neg Hx    • BRCA 1/2 Neg Hx    • Endometrial cancer Neg Hx    • Ovarian cancer Neg Hx    ,   Social History     Tobacco Use   • Smoking status: Never   • Smokeless tobacco: Never   Substance Use Topics   • Alcohol use: Yes     Comment: RARELY   • Drug use: No   , (Not in a hospital admission)   and Allergies:  Bactrim [sulfamethoxazole-trimethoprim], Cortisone, Decadron [dexamethasone], Hydrocortisone, and Erythromycin    Current Outpatient Medications:   •  aspirin 81 MG chewable tablet, Chew 81 mg Daily., Disp: , Rfl:   •  Biotin 1 MG capsule, Take 1 capsule by mouth Daily., Disp: , Rfl:   •  Calcium Carb-Cholecalciferol (CALCIUM 1000 + D PO), Take  by mouth Daily., Disp: , Rfl:   •  glucosamine sulfate 500 MG capsule capsule, Take  by mouth 3 (Three) Times a Day With Meals., Disp: , Rfl:   •  Multiple Vitamins-Minerals (MULTIVITAMIN WITH MINERALS) tablet tablet, Take 1 tablet by mouth Daily., Disp: , Rfl:   •  Probiotic Product (PROBIOTIC PO), Take 1 capsule by mouth Daily., Disp: , Rfl:     Objective     Vital Signs   /80 (BP Location: Left arm, Patient Position: Sitting, Cuff Size: Adult)   Pulse 80   Temp 97.6 °F (36.4 °C)   Ht 167.6 cm (65.98\")   Wt 64.8 kg (142 lb 13.7 oz)   BMI 23.07 kg/m²      Physical Exam:  The breasts are asymmetrical secondary to the previous right upper outer breast lumpectomy and radiation, there is no skin change other than that divot in the right upper outer breast from her radiation, there are no palpable abnormalities in the left or right breast or axilla.  There is some neovascularization of " "the anterior abdominal wall.  No abnormalities in the left or right breast or axilla.Physical Exam  Chest:               Results Review:  Result Review :  Lab Results   Component Value Date    FINALDX  09/10/2018     1.  Breast, right, lumpectomy and sentinel lymph node resection:  Invasive ductal carcinoma, intermediate grade  Tumor measures 9 mm in greatest linear dimension  Margins of resection are negative  Tumor measures 2 mm from the closest, anterior, margin.    Positive for metastatic ductal carcinoma in one out of 2 sentinel lymph nodes (1/2)  The largest metastatic focus is 2.5 mm  Negative for extra capsular extension    AJCC cancer stage: pT1b, (sn)pN1a      GROSSDES  09/10/2018        Specimen #1 is received in a formalin filled container, labeled with the patient's name, date of birth, and \"right breast tissue and sentinel node\".  The specimen was excised at 1208 and has a cold ischemic time of less than 20 minutes.  The specimen has a formalin fixation time of 9-1/2 hours.  The specimen consists of a fragment of fibroadipose tissue with attached sentinel lymph nodes off the superior pole.  The fatty tissue with sentinel nodes is removed and the fragment of breast tissue measures 8.4 cm superior to inferior by 7.1 cm medial to lateral by 2.6 cm anterior to posterior.  A skin ellipse is present measuring 4.5 cm medial to lateral by 1.1 cm superior to inferior.  2 short white sutures designate the medial aspect, a single long white suture designates the inferior aspect per surgeon note.  The medial edge is inked red, the lateral edge is inked yellow, the inferior edge is inked green, the superior edge is inked blue, the anterior margin is inked orange, and the deep margin is inked black.  Sectioning reveals a stellate pink-gray mass in the superior half specimen, just superior to the skin ellipse measuring 0.9 x 0.7 x 0.5 cm.  The specimen in this area measures 0.8 cm anterior to posterior and the mass " measures 0.2 cm of the anterior margin and 0.3 cm to the deep margin.  The mass measures 1.3 cm from the medial resection margin, 3.1 cm from the lateral margin 8 cm from the superior margin, and 3.7 cm of the inferior margin.  Further dissection reveals no additional masses.  Diffusely thickened tissue is identified in the anterior aspect of the inferior half the specimen measuring up to 2.0 cm and abutting the anterior, lateral, and medial resection margins.  The remaining cut surface is yellow-pink, soft and lobulated.     The attached segment of soft tissue with sentinel nodes measures 4.5 x 2.4 x 0.5 cm.  2 long black sutures designate the first sentinel node.  The suture is present in the intact fragment of fatty tissue measuring 2.0 cm in greatest dimension.  The fragment is bisected and reveals a small pink-gray lymph node candidate measuring 0.3 cm in greatest dimension.  The remaining soft tissue is rubbery and fatty.  2 short black sutures designate the second sentinel lymph node.  The node measures A fatty replacement of lymph node measuring 1.7 cm in greatest dimension.  The cut surface reveals 90% fatty replacement with a small amount of residual gray-white lymph node.    The breast mass is serially sectioned and submitted from superior to inferior and blocks 1A through 1D.  Representative sections of dense fibrous tissue in the inferior pole are submitted in blocks 1E and 1F.  A representative section of the remaining breast tissue is submitted in block 1G.  A representative section of inferior resection margin is submitted in block 1H.  A representative section of superior resection margin is submitted in block 1I.  The first sentinel lymph node (inferior) is bisected and submitted in block 1J.  The second sentinel lymph node (superior) is bisected and submitted in blocks 1K and 1L.              Assessment & Plan       Diagnoses and all orders for this visit:    1. S/P lumpectomy, right breast  (Primary)  -     Mammo Screening Bilateral With CAD; Future    2. Malignant neoplasm of upper-outer quadrant of right female breast, unspecified estrogen receptor status (HCC)  Overview:  Diagnosed in September 2018 with Stage IIA (T1b, cN1a, cM0, G2) Invasive Ductal Carcinoma, 9mm,  ER/OH + 1/2  SNB positive for metastatic  carcinoma. Underwent Breast, right, lumpectomy and sentinel lymph node resection.  Completed chemotherapy course Taxotere/Cytoxan - 4 cycles completion on 11/27/2018.  Completed radiation, 6000 cGy to right breast, right axilla and supraclavicular region with completion on 02/25/2019    Orders:  -     Mammo Screening Bilateral With CAD; Future    3. S/P lymph node biopsy  -     Mammo Screening Bilateral With CAD; Future    4. History of radiation therapy  -     Mammo Screening Bilateral With CAD; Future    5. Prophylactic use of letrozole (Femara)  -     Mammo Screening Bilateral With CAD; Future    6. Screening mammogram for high-risk patient       Status post treatment of a T1N1 tumor, 4 years prior, presently doing well, unremarkable breast exam unremarkable mammogram, follow-up with me in 1 year with mammography, sooner if questions or problems arise    Discussed BMI elevation and need to move toward a reduced BMI for health concerns she appears to understand she is a non smoker and her meds were reviewed.      Duglas Lawrence MD  11/09/22  09:19 CST

## 2023-04-12 ENCOUNTER — LAB (OUTPATIENT)
Dept: LAB | Facility: HOSPITAL | Age: 68
End: 2023-04-12
Payer: MEDICARE

## 2023-04-12 DIAGNOSIS — C50.411 MALIGNANT NEOPLASM OF UPPER-OUTER QUADRANT OF RIGHT BREAST IN FEMALE, ESTROGEN RECEPTOR POSITIVE: ICD-10-CM

## 2023-04-12 DIAGNOSIS — M85.88 OSTEOPENIA OF LUMBAR SPINE: ICD-10-CM

## 2023-04-12 DIAGNOSIS — Z17.0 MALIGNANT NEOPLASM OF UPPER-OUTER QUADRANT OF RIGHT BREAST IN FEMALE, ESTROGEN RECEPTOR POSITIVE: ICD-10-CM

## 2023-04-12 LAB
25(OH)D3 SERPL-MCNC: 58.5 NG/ML (ref 30–100)
ALBUMIN SERPL-MCNC: 4.6 G/DL (ref 3.5–5.2)
ALBUMIN/GLOB SERPL: 1.8 G/DL
ALP SERPL-CCNC: 80 U/L (ref 39–117)
ALT SERPL W P-5'-P-CCNC: 11 U/L (ref 1–33)
ANION GAP SERPL CALCULATED.3IONS-SCNC: 9 MMOL/L (ref 5–15)
AST SERPL-CCNC: 18 U/L (ref 1–32)
BASOPHILS # BLD AUTO: 0.06 10*3/MM3 (ref 0–0.2)
BASOPHILS NFR BLD AUTO: 1.4 % (ref 0–1.5)
BILIRUB SERPL-MCNC: 0.7 MG/DL (ref 0–1.2)
BUN SERPL-MCNC: 13 MG/DL (ref 8–23)
BUN/CREAT SERPL: 20.6 (ref 7–25)
CALCIUM SPEC-SCNC: 9.9 MG/DL (ref 8.6–10.5)
CEA SERPL-MCNC: 2.21 NG/ML
CHLORIDE SERPL-SCNC: 105 MMOL/L (ref 98–107)
CO2 SERPL-SCNC: 28 MMOL/L (ref 22–29)
CREAT SERPL-MCNC: 0.63 MG/DL (ref 0.57–1)
DEPRECATED RDW RBC AUTO: 42.3 FL (ref 37–54)
EGFRCR SERPLBLD CKD-EPI 2021: 96.8 ML/MIN/1.73
EOSINOPHIL # BLD AUTO: 0.22 10*3/MM3 (ref 0–0.4)
EOSINOPHIL NFR BLD AUTO: 5.2 % (ref 0.3–6.2)
ERYTHROCYTE [DISTWIDTH] IN BLOOD BY AUTOMATED COUNT: 13.2 % (ref 12.3–15.4)
GLOBULIN UR ELPH-MCNC: 2.6 GM/DL
GLUCOSE SERPL-MCNC: 94 MG/DL (ref 65–99)
HCT VFR BLD AUTO: 44.8 % (ref 34–46.6)
HGB BLD-MCNC: 14.1 G/DL (ref 12–15.9)
IMM GRANULOCYTES # BLD AUTO: 0.01 10*3/MM3 (ref 0–0.05)
IMM GRANULOCYTES NFR BLD AUTO: 0.2 % (ref 0–0.5)
LYMPHOCYTES # BLD AUTO: 1.27 10*3/MM3 (ref 0.7–3.1)
LYMPHOCYTES NFR BLD AUTO: 30 % (ref 19.6–45.3)
MCH RBC QN AUTO: 27.8 PG (ref 26.6–33)
MCHC RBC AUTO-ENTMCNC: 31.5 G/DL (ref 31.5–35.7)
MCV RBC AUTO: 88.4 FL (ref 79–97)
MONOCYTES # BLD AUTO: 0.39 10*3/MM3 (ref 0.1–0.9)
MONOCYTES NFR BLD AUTO: 9.2 % (ref 5–12)
NEUTROPHILS NFR BLD AUTO: 2.29 10*3/MM3 (ref 1.7–7)
NEUTROPHILS NFR BLD AUTO: 54 % (ref 42.7–76)
NRBC BLD AUTO-RTO: 0 /100 WBC (ref 0–0.2)
PLATELET # BLD AUTO: 224 10*3/MM3 (ref 140–450)
PMV BLD AUTO: 9.6 FL (ref 6–12)
POTASSIUM SERPL-SCNC: 4.7 MMOL/L (ref 3.5–5.2)
PROT SERPL-MCNC: 7.2 G/DL (ref 6–8.5)
RBC # BLD AUTO: 5.07 10*6/MM3 (ref 3.77–5.28)
SODIUM SERPL-SCNC: 142 MMOL/L (ref 136–145)
WBC NRBC COR # BLD: 4.24 10*3/MM3 (ref 3.4–10.8)

## 2023-04-12 PROCEDURE — 82378 CARCINOEMBRYONIC ANTIGEN: CPT

## 2023-04-12 PROCEDURE — 82306 VITAMIN D 25 HYDROXY: CPT

## 2023-04-12 PROCEDURE — 36415 COLL VENOUS BLD VENIPUNCTURE: CPT

## 2023-04-12 PROCEDURE — 86300 IMMUNOASSAY TUMOR CA 15-3: CPT

## 2023-04-12 PROCEDURE — 85025 COMPLETE CBC W/AUTO DIFF WBC: CPT

## 2023-04-12 PROCEDURE — 80053 COMPREHEN METABOLIC PANEL: CPT

## 2023-04-13 LAB — CANCER AG27-29 SERPL-ACNC: 15 U/ML (ref 0–38.6)

## 2023-04-19 ENCOUNTER — OFFICE VISIT (OUTPATIENT)
Dept: ONCOLOGY | Facility: CLINIC | Age: 68
End: 2023-04-19
Payer: MEDICARE

## 2023-04-19 VITALS
TEMPERATURE: 98.4 F | HEART RATE: 84 BPM | SYSTOLIC BLOOD PRESSURE: 122 MMHG | RESPIRATION RATE: 16 BRPM | DIASTOLIC BLOOD PRESSURE: 76 MMHG | BODY MASS INDEX: 21.86 KG/M2 | WEIGHT: 136 LBS | OXYGEN SATURATION: 98 % | HEIGHT: 66 IN

## 2023-04-19 DIAGNOSIS — Z85.3 HISTORY OF BREAST CANCER: Primary | ICD-10-CM

## 2023-04-19 DIAGNOSIS — M85.88 OSTEOPENIA OF LUMBAR SPINE: ICD-10-CM

## 2023-04-19 NOTE — PROGRESS NOTES
[facility]ATOLOGY & [facility]COL[facility] [place]  [address]  [place] [address] [phone]    Patient Name: Adriana Samuels  Encounter Date: 04/19/2023   YOB: 1955  Patient Number: [id number]    Hematology / Oncology Progress Note      HPI / REASON FOR VISIT: Adriana Samuels is a 67 y.o. female who is followed by this office for history of Stage IIA right breast cancer.  She was treated with right breast lumpectomy and sentinel lymph node biopsy, 4 cycles of adjuvant Taxotere and Cytoxan which was completed on November 27, 2018 and adjuvant radiation which was completed in February 2019.  She was advised adjuvant Femara 2.5 mg p.o. daily, however, patient declined.      Her last mammogram was November 2, 2022.  Findings include Stable postsurgical change at the upper outer right breast.  Right breast skin thickening and postsurgical distortion noted.  There are no suspicious microcalcifications or new areas of  architectural distortion.  Diagnostic (based on her breast cancer personal history) bilateral mammographyin one year.    She had a DEXA scan on November 1, 2021 which showed osteopenia of the lumbar spine with T score of -2.  Bone mineral density in the lumbar spine was decreased when compared to previous study.  Dr. Singh discussed options with patient including continuing to monitor versus bone strengthening medication and patient elected to continue to monitor.      INTERVAL HISTORY     She presents to clinic today for continued follow up with her .  She has no acute complaints today. Labs were drawn last week and results were reviewed with patient today.         LABS    Lab Results - Last 18 Months   Lab Units 04/12/23  0807 10/06/22  0802 04/07/22  0803   HEMOGLOBIN g/dL 14.1 14.0 13.9   HEMATOCRIT % 44.8 46.0 43.9   MCV fL 88.4 89.7 88.3   WBC 10*3/mm3 4.24 3.96 3.94   RDW % 13.2 13.1 13.2   MPV fL 9.6 10.0 9.5    PLATELETS 10*3/mm3 224 253 205   IMM GRAN % % 0.2 0.3 0.0   NEUTROS ABS 10*3/mm3 2.29 2.09 2.42   LYMPHS ABS 10*3/mm3 1.27 1.28 0.94   MONOS ABS 10*3/mm3 0.39 0.35 0.42   EOS ABS 10*3/mm3 0.22 0.19 0.12   BASOS ABS 10*3/mm3 0.06 0.04 0.04   IMMATURE GRANS (ABS) 10*3/mm3 0.01 0.01 0.00   NRBC /100 WBC 0.0 0.0 0.0       Lab Results - Last 18 Months   Lab Units 04/12/23  0807 10/06/22  0802 04/07/22  0803   GLUCOSE mg/dL 94 67 91   SODIUM mmol/L 142 144 142   POTASSIUM mmol/L 4.7 4.7 4.6   CO2 mmol/L 28.0 32.0* 30.0*   CHLORIDE mmol/L 105 103 105   ANION GAP mmol/L 9.0 9.0 7.0   CREATININE mg/dL 0.63 0.74 0.67   BUN mg/dL 13 13 14   BUN / CREAT RATIO  20.6 17.6 20.9   CALCIUM mg/dL 9.9 9.7 9.4   ALK PHOS U/L 80 79 79   TOTAL PROTEIN g/dL 7.2 7.5 6.8   ALT (SGPT) U/L 11 13 11   AST (SGOT) U/L 18 16 17   BILIRUBIN mg/dL 0.7 0.6 0.5   ALBUMIN g/dL 4.6 4.40 4.60   GLOBULIN gm/dL 2.6 3.1 2.2       Lab Results - Last 18 Months   Lab Units 04/12/23  0807 10/06/22  0802 04/07/22  0803   CEA ng/mL 2.21 1.83 1.67       No results for input(s): IRON, TIBC, LABIRON, FERRITIN, F1RSENG, TSH, FOLATE in the last 58910 hours.    Invalid input(s): VITB12      PAST MEDICAL HISTORY:  ALLERGIES:  Allergies   Allergen Reactions   • Bactrim [Sulfamethoxazole-Trimethoprim] Rash   • Cortisone Rash   • Decadron [Dexamethasone] Hives   • Hydrocortisone Hives     Oral or IV   • Erythromycin Nausea Only and Rash     CURRENT MEDICATIONS:  Outpatient Encounter Medications as of 4/19/2023   Medication Sig Dispense Refill   • aspirin 81 MG chewable tablet Chew 1 tablet Daily.     • Biotin 1 MG capsule Take 1 capsule by mouth Daily.     • Calcium Carb-Cholecalciferol (CALCIUM 1000 + D PO) Take  by mouth Daily.     • glucosamine sulfate 500 MG capsule capsule Take  by mouth 3 (Three) Times a Day With Meals.     • Multiple Vitamins-Minerals (MULTIVITAMIN WITH MINERALS) tablet tablet Take 1 tablet by mouth Daily.     • Probiotic Product (PROBIOTIC PO)  Take 1 capsule by mouth Daily.       No facility-administered encounter medications on file as of 4/19/2023.     ADULT ILLNESSES:  Patient Active Problem List   Diagnosis Code   • Malignant neoplasm of upper-outer quadrant of right female breast C50.411   • Non-smoker Z78.9   • S/P lumpectomy, right breast Z98.890   • S/P lymph node biopsy Z98.890   • History of radiation therapy Z92.3   • Prophylactic use of letrozole (Femara) Z79.811   • Screening mammogram for high-risk patient Z12.31     SURGERIES:  Past Surgical History:   Procedure Laterality Date   • BREAST BIOPSY Right 09/2018    positive   • BREAST LUMPECTOMY Right 09/2018    lumpectomy with chemo and rad tx   • BREAST LUMPECTOMY WITH SENTINEL NODE BIOPSY Right 9/10/2018    Procedure: RIGHT BREAST LUMPECTOMY WITH SENTINEL LYMPH NODE BIOPSY, INJECTION AND SCAN;  Surgeon: Duglas Lawrence MD;  Location: [facility] OR;  Service: General   • COLONOSCOPY N/A 6/6/2019    Procedure: COLONOSCOPY WITH ANESTHESIA;  Surgeon: Brock Raman MD;  Location: [facility] ENDOSCOPY;  Service: Gastroenterology   • HYDROCELE EXCISION / REPAIR     • LASER ABLATION OF THE CERVIX     • WRIST FUSION       HEALTH MAINTENANCE ITEMS:  Health Maintenance Due   Topic Date Due   • TDAP/TD VACCINES (1 - Tdap) Never done   • ZOSTER VACCINE (1 of 2) Never done   • HEPATITIS C SCREENING  Never done   • ANNUAL WELLNESS VISIT  Never done   • Pneumococcal Vaccine 65+ (1 - PCV) Never done   • COVID-19 Vaccine (5 - Booster for Pfizer series) 02/09/2022       <no information>  Last Completed Colonoscopy          COLORECTAL CANCER SCREENING (COLONOSCOPY - Every 10 Years) Next due on 6/6/2029 06/06/2019  Surgical Procedure: COLONOSCOPY    06/06/2019  COLONOSCOPY              Immunization History   Administered Date(s) Administered   • COVID-19 (PFIZER) PURPLE CAP 03/25/2021, 04/15/2021, 12/15/2021     Last Completed Mammogram          Scheduled - MAMMOGRAM (Yearly) Scheduled for 11/3/2023     11/02/2022  Mammo Diagnostic Digital Tomosynthesis Bilateral With CAD    11/01/2021  Mammo Diagnostic Digital Tomosynthesis Bilateral With CAD    10/29/2020  Mammo Diagnostic Digital Tomosynthesis Bilateral With CAD    10/28/2019  Mammo Diagnostic Digital Tomosynthesis Bilateral With CAD    04/26/2019  Mammo Diagnostic Digital Tomosynthesis Right With CAD    Only the first 5 history entries have been loaded, but more history exists.                  FAMILY HISTORY:  Family History   Problem Relation Age of Onset   • No Known Problems Mother    • Skin cancer Father    • No Known Problems Sister    • No Known Problems Brother    • No Known Problems Sister    • No Known Problems Brother    • No Known Problems Son    • No Known Problems Son    • No Known Problems Son    • No Known Problems Daughter    • No Known Problems Maternal Grandmother    • No Known Problems Paternal Grandmother    • No Known Problems Maternal Aunt    • No Known Problems Paternal Aunt    • No Known Problems Other    • Breast cancer Neg Hx    • Colon cancer Neg Hx    • Colon polyps Neg Hx    • BRCA 1/2 Neg Hx    • Endometrial cancer Neg Hx    • Ovarian cancer Neg Hx      SOCIAL HISTORY:  Social History     Socioeconomic History   • Marital status:    Tobacco Use   • Smoking status: Never   • Smokeless tobacco: Never   Substance and Sexual Activity   • Alcohol use: Yes     Comment: RARELY   • Drug use: No   • Sexual activity: Defer       REVIEW OF SYSTEMS:  Review of Systems   Constitutional: Negative for fatigue and fever.   HENT: Negative for trouble swallowing.    Respiratory: Negative for cough and shortness of breath.    Cardiovascular: Negative for chest pain, palpitations and leg swelling.   Gastrointestinal: Negative for nausea and vomiting.   Genitourinary: Negative for hematuria.   Musculoskeletal: Negative for arthralgias and myalgias.   Skin: Negative for rash, skin lesions and wound.   Neurological: Negative for dizziness,  "syncope, memory problem and confusion.   Psychiatric/Behavioral: Negative for suicidal ideas and depressed mood. The patient is not nervous/anxious.        /76   Pulse 84   Temp 98.4 °F (36.9 °C)   Resp 16   Ht 167.6 cm (66\")   Wt 61.7 kg (136 lb)   SpO2 98%   BMI 21.95 kg/m²  Body surface area is 1.7 meters squared.    Pain Score    04/19/23 0756   PainSc: 0-No pain       Physical Exam:  Physical Exam  Constitutional:       Appearance: Normal appearance.   HENT:      Head: Normocephalic and atraumatic.   Cardiovascular:      Rate and Rhythm: Normal rate and regular rhythm.   Pulmonary:      Effort: Pulmonary effort is normal.      Breath sounds: Normal breath sounds.   Chest:      Comments: Patient declined breast exam today.  Abdominal:      General: Bowel sounds are normal.      Palpations: Abdomen is soft.   Musculoskeletal:      Right lower leg: No edema.      Left lower leg: No edema.   Skin:     General: Skin is warm and dry.   Neurological:      Mental Status: She is alert and oriented to person, place, and time.   Psychiatric:         Attention and Perception: Attention normal.         Mood and Affect: Mood normal.         Judgment: Judgment normal.         Adriana Samuels reports a pain score of 0.  Given her pain assessment as noted, treatment options were discussed and the following options were decided upon as a follow-up plan to address the patient's pain: No intervention indicated.          ASSESSMENT / PLAN    1. History of breast cancer    2. Osteopenia of lumbar spine         1.  History of Breast Cancer   Invasive ductal carcinoma: Stage: AJCC IIA (pT1b, pN1a, M0, G2) Tumor size 9 mm.  Hormone Status: %, MI 86%, and negative HER-2/gregory.  Baseline Node Status: 1/2 sentinel node positive for metastasis.   Complications of Tumor: None.   Treatment status: Lumpectomy, Post adjuvant Taxotere and Cytoxan. Post adjuvant radiation 02/2019.   Declined adjuvant Femara.     - Mammogram " November 2, 2022 with benign findings.  -Heme status stable   -Tumor markers 04/12/23:  Ca 27.29:  15.0,  CEA 2.21    2.  Osteopenia   -DEXA Scan 11/1/21 with lumbar spine with T score of -2.    -Pt declined bone strengthening medication  -Taking Calcium Vit D  -Repeat DEXA Scan in 2023   -Vitamin D 58.5, Calcium 9.9    PLAN:  Stable for observation.    Continue annual mammograms.  Return to office in 6 months  Patient will have preoffice labs for CBC with differential, CMP, CA27-29, CEA and vitamin D.   Continue current medications, treatment plans and follow up with PCP and any other providers.  Care discussed with patient.  Understanding expressed.  Patient agreeable with plan.        ACP discussion was held with the patient during this visit. Patient has an advance directive (not in EMR), copy requested.      Karen Tineo, TRISTA  04/19/2023

## 2023-10-12 ENCOUNTER — LAB (OUTPATIENT)
Dept: LAB | Facility: HOSPITAL | Age: 68
End: 2023-10-12
Payer: MEDICARE

## 2023-10-12 DIAGNOSIS — Z85.3 HISTORY OF BREAST CANCER: ICD-10-CM

## 2023-10-12 DIAGNOSIS — M85.88 OSTEOPENIA OF LUMBAR SPINE: ICD-10-CM

## 2023-10-12 LAB
25(OH)D3 SERPL-MCNC: 58.7 NG/ML (ref 30–100)
ALBUMIN SERPL-MCNC: 4.5 G/DL (ref 3.5–5.2)
ALBUMIN/GLOB SERPL: 1.7 G/DL
ALP SERPL-CCNC: 75 U/L (ref 39–117)
ALT SERPL W P-5'-P-CCNC: 10 U/L (ref 1–33)
ANION GAP SERPL CALCULATED.3IONS-SCNC: 9 MMOL/L (ref 5–15)
AST SERPL-CCNC: 17 U/L (ref 1–32)
BASOPHILS # BLD AUTO: 0.05 10*3/MM3 (ref 0–0.2)
BASOPHILS NFR BLD AUTO: 1.3 % (ref 0–1.5)
BILIRUB SERPL-MCNC: 0.6 MG/DL (ref 0–1.2)
BUN SERPL-MCNC: 12 MG/DL (ref 8–23)
BUN/CREAT SERPL: 17.9 (ref 7–25)
CALCIUM SPEC-SCNC: 9.7 MG/DL (ref 8.6–10.5)
CEA SERPL-MCNC: 2.22 NG/ML
CHLORIDE SERPL-SCNC: 104 MMOL/L (ref 98–107)
CO2 SERPL-SCNC: 29 MMOL/L (ref 22–29)
CREAT SERPL-MCNC: 0.67 MG/DL (ref 0.57–1)
DEPRECATED RDW RBC AUTO: 42.8 FL (ref 37–54)
EGFRCR SERPLBLD CKD-EPI 2021: 94.7 ML/MIN/1.73
EOSINOPHIL # BLD AUTO: 0.14 10*3/MM3 (ref 0–0.4)
EOSINOPHIL NFR BLD AUTO: 3.7 % (ref 0.3–6.2)
ERYTHROCYTE [DISTWIDTH] IN BLOOD BY AUTOMATED COUNT: 13.2 % (ref 12.3–15.4)
GLOBULIN UR ELPH-MCNC: 2.6 GM/DL
GLUCOSE SERPL-MCNC: 85 MG/DL (ref 65–99)
HCT VFR BLD AUTO: 45 % (ref 34–46.6)
HGB BLD-MCNC: 13.7 G/DL (ref 12–15.9)
IMM GRANULOCYTES # BLD AUTO: 0.01 10*3/MM3 (ref 0–0.05)
IMM GRANULOCYTES NFR BLD AUTO: 0.3 % (ref 0–0.5)
LYMPHOCYTES # BLD AUTO: 1.14 10*3/MM3 (ref 0.7–3.1)
LYMPHOCYTES NFR BLD AUTO: 30 % (ref 19.6–45.3)
MCH RBC QN AUTO: 26.8 PG (ref 26.6–33)
MCHC RBC AUTO-ENTMCNC: 30.4 G/DL (ref 31.5–35.7)
MCV RBC AUTO: 88.1 FL (ref 79–97)
MONOCYTES # BLD AUTO: 0.39 10*3/MM3 (ref 0.1–0.9)
MONOCYTES NFR BLD AUTO: 10.3 % (ref 5–12)
NEUTROPHILS NFR BLD AUTO: 2.07 10*3/MM3 (ref 1.7–7)
NEUTROPHILS NFR BLD AUTO: 54.4 % (ref 42.7–76)
NRBC BLD AUTO-RTO: 0 /100 WBC (ref 0–0.2)
PLATELET # BLD AUTO: 208 10*3/MM3 (ref 140–450)
PMV BLD AUTO: 9.8 FL (ref 6–12)
POTASSIUM SERPL-SCNC: 4.5 MMOL/L (ref 3.5–5.2)
PROT SERPL-MCNC: 7.1 G/DL (ref 6–8.5)
RBC # BLD AUTO: 5.11 10*6/MM3 (ref 3.77–5.28)
SODIUM SERPL-SCNC: 142 MMOL/L (ref 136–145)
WBC NRBC COR # BLD: 3.8 10*3/MM3 (ref 3.4–10.8)

## 2023-10-12 PROCEDURE — 86300 IMMUNOASSAY TUMOR CA 15-3: CPT

## 2023-10-12 PROCEDURE — 36415 COLL VENOUS BLD VENIPUNCTURE: CPT

## 2023-10-12 PROCEDURE — 85025 COMPLETE CBC W/AUTO DIFF WBC: CPT

## 2023-10-12 PROCEDURE — 82306 VITAMIN D 25 HYDROXY: CPT

## 2023-10-12 PROCEDURE — 80053 COMPREHEN METABOLIC PANEL: CPT

## 2023-10-12 PROCEDURE — 82378 CARCINOEMBRYONIC ANTIGEN: CPT

## 2023-10-13 LAB — CANCER AG27-29 SERPL-ACNC: 14 U/ML (ref 0–38.6)

## 2023-10-19 ENCOUNTER — OFFICE VISIT (OUTPATIENT)
Dept: ONCOLOGY | Facility: CLINIC | Age: 68
End: 2023-10-19
Payer: MEDICARE

## 2023-10-19 VITALS
HEIGHT: 66 IN | OXYGEN SATURATION: 99 % | BODY MASS INDEX: 21.21 KG/M2 | DIASTOLIC BLOOD PRESSURE: 72 MMHG | SYSTOLIC BLOOD PRESSURE: 108 MMHG | HEART RATE: 81 BPM | RESPIRATION RATE: 16 BRPM | TEMPERATURE: 97.6 F | WEIGHT: 132 LBS

## 2023-10-19 DIAGNOSIS — Z85.3 HISTORY OF BREAST CANCER: Primary | ICD-10-CM

## 2023-10-19 DIAGNOSIS — M85.88 OSTEOPENIA OF LUMBAR SPINE: ICD-10-CM

## 2023-10-19 NOTE — PROGRESS NOTES
[facility] HEMATOLOGY & ONCOLOGY [place]  [address]  [facility] [address] [phone]    Patient Name: Adriana Samuels  Encounter Date: 10/19/2023   YOB: 1955  Patient Number: [id number]    Hematology / Oncology Progress Note      HPI / REASON FOR VISIT: Adriana Samuels is a 68 y.o. female who is followed by this office for history of Stage IIA right breast cancer.  She was treated with right breast lumpectomy and sentinel lymph node biopsy, 4 cycles of adjuvant Taxotere and Cytoxan which was completed on November 27, 2018 and adjuvant radiation which was completed in February 2019.  She was advised adjuvant Femara 2.5 mg p.o. daily, however, patient declined.      Her last mammogram was November 2, 2022.  Findings include Stable postsurgical change at the upper outer right breast.  Right breast skin thickening and postsurgical distortion noted.  There are no suspicious microcalcifications or new areas of architectural distortion.  Diagnostic (based on her breast cancer personal history) bilateral mammographyin one year.    She had a DEXA scan on November 1, 2021 which showed osteopenia of the lumbar spine with T score of -2.  Bone mineral density in the lumbar spine was decreased when compared to previous study.  Dr. Singh discussed options with patient including continuing to monitor versus bone strengthening medication and patient elected to continue to monitor.      INTERVAL HISTORY     She presents to clinic today for continued follow up.  She has no acute complaints today. Labs were drawn last week and results were reviewed with patient today.         LABS    Lab Results - Last 18 Months   Lab Units 10/12/23  0813 04/12/23  0807 10/06/22  0802   HEMOGLOBIN g/dL 13.7 14.1 14.0   HEMATOCRIT % 45.0 44.8 46.0   MCV fL 88.1 88.4 89.7   WBC 10*3/mm3 3.80 4.24 3.96   RDW % 13.2 13.2 13.1   MPV fL 9.8 9.6 10.0   PLATELETS 10*3/mm3  "208 224 253   IMM GRAN % % 0.3 0.2 0.3   NEUTROS ABS 10*3/mm3 2.07 2.29 2.09   LYMPHS ABS 10*3/mm3 1.14 1.27 1.28   MONOS ABS 10*3/mm3 0.39 0.39 0.35   EOS ABS 10*3/mm3 0.14 0.22 0.19   BASOS ABS 10*3/mm3 0.05 0.06 0.04   IMMATURE GRANS (ABS) 10*3/mm3 0.01 0.01 0.01   NRBC /100 WBC 0.0 0.0 0.0       Lab Results - Last 18 Months   Lab Units 10/12/23  0813 04/12/23  0807 10/06/22  0802   GLUCOSE mg/dL 85 94 67   SODIUM mmol/L 142 142 144   POTASSIUM mmol/L 4.5 4.7 4.7   CO2 mmol/L 29.0 28.0 32.0*   CHLORIDE mmol/L 104 105 103   ANION GAP mmol/L 9.0 9.0 9.0   CREATININE mg/dL 0.67 0.63 0.74   BUN mg/dL 12 13 13   BUN / CREAT RATIO  17.9 20.6 17.6   CALCIUM mg/dL 9.7 9.9 9.7   ALK PHOS U/L 75 80 79   TOTAL PROTEIN g/dL 7.1 7.2 7.5   ALT (SGPT) U/L 10 11 13   AST (SGOT) U/L 17 18 16   BILIRUBIN mg/dL 0.6 0.7 0.6   ALBUMIN g/dL 4.5 4.6 4.40   GLOBULIN gm/dL 2.6 2.6 3.1       Lab Results - Last 18 Months   Lab Units 10/12/23  0813 04/12/23  0807 10/06/22  0802   CEA ng/mL 2.22 2.21 1.83       No results for input(s): \"IRON\", \"TIBC\", \"LABIRON\", \"FERRITIN\", \"K0GCCVZ\", \"TSH\", \"FOLATE\" in the last 61622 hours.    Invalid input(s): \"VITB12\"      PAST MEDICAL HISTORY:  ALLERGIES:  Allergies   Allergen Reactions    Bactrim [Sulfamethoxazole-Trimethoprim] Rash    Cortisone Rash    Decadron [Dexamethasone] Hives    Hydrocortisone Hives     Oral or IV    Erythromycin Nausea Only and Rash     CURRENT MEDICATIONS:  Outpatient Encounter Medications as of 10/19/2023   Medication Sig Dispense Refill    aspirin 81 MG chewable tablet Chew 1 tablet Daily.      Biotin 1 MG capsule Take 1 capsule by mouth Daily.      Calcium Carb-Cholecalciferol (CALCIUM 1000 + D PO) Take  by mouth Daily.      glucosamine sulfate 500 MG capsule capsule Take  by mouth 3 (Three) Times a Day With Meals.      Multiple Vitamins-Minerals (MULTIVITAMIN WITH MINERALS) tablet tablet Take 1 tablet by mouth Daily.      Probiotic Product (PROBIOTIC PO) Take 1 capsule by " mouth Daily.       No facility-administered encounter medications on file as of 10/19/2023.     ADULT ILLNESSES:  Patient Active Problem List   Diagnosis Code    Malignant neoplasm of upper-outer quadrant of right female breast C50.411    Non-smoker Z78.9    S/P lumpectomy, right breast Z98.890    S/P lymph node biopsy Z98.890    History of radiation therapy Z92.3    Prophylactic use of letrozole (Femara) Z79.811    Screening mammogram for high-risk patient Z12.31     SURGERIES:  Past Surgical History:   Procedure Laterality Date    BREAST BIOPSY Right 09/2018    positive    BREAST LUMPECTOMY Right 09/2018    lumpectomy with chemo and rad tx    BREAST LUMPECTOMY WITH SENTINEL NODE BIOPSY Right 9/10/2018    Procedure: RIGHT BREAST LUMPECTOMY WITH SENTINEL LYMPH NODE BIOPSY, INJECTION AND SCAN;  Surgeon: Duglas Lawrence MD;  Location: [facility] OR;  Service: General    COLONOSCOPY N/A 6/6/2019    Procedure: COLONOSCOPY WITH ANESTHESIA;  Surgeon: Brock Raman MD;  Location: [facility] ENDOSCOPY;  Service: Gastroenterology    HYDROCELE EXCISION / REPAIR      LASER ABLATION OF THE CERVIX      WRIST FUSION       HEALTH MAINTENANCE ITEMS:  Health Maintenance Due   Topic Date Due    TDAP/TD VACCINES (1 - Tdap) Never done    ZOSTER VACCINE (1 of 2) Never done    HEPATITIS C SCREENING  Never done    ANNUAL WELLNESS VISIT  Never done    Pneumococcal Vaccine 65+ (1 - PCV) Never done    INFLUENZA VACCINE  08/01/2023    COVID-19 Vaccine (6 - 2023-24 season) 09/01/2023       <no information>  Last Completed Colonoscopy            COLORECTAL CANCER SCREENING (COLONOSCOPY - Every 10 Years) Next due on 6/6/2029 06/06/2019  Surgical Procedure: COLONOSCOPY    06/06/2019  COLONOSCOPY                  Immunization History   Administered Date(s) Administered    COVID-19 (PFIZER) Purple Cap Monovalent 03/25/2021, 04/15/2021, 12/15/2021     Last Completed Mammogram            Scheduled - MAMMOGRAM (Yearly) Scheduled for 11/3/2023       11/02/2022  Mammo Diagnostic Digital Tomosynthesis Bilateral With CAD    11/01/2021  Mammo Diagnostic Digital Tomosynthesis Bilateral With CAD    10/29/2020  Mammo Diagnostic Digital Tomosynthesis Bilateral With CAD    10/28/2019  Mammo Diagnostic Digital Tomosynthesis Bilateral With CAD    04/26/2019  Mammo Diagnostic Digital Tomosynthesis Right With CAD    Only the first 5 history entries have been loaded, but more history exists.                      FAMILY HISTORY:  Family History   Problem Relation Age of Onset    No Known Problems Mother     Skin cancer Father     No Known Problems Sister     No Known Problems Brother     No Known Problems Sister     No Known Problems Brother     No Known Problems Son     No Known Problems Son     No Known Problems Son     No Known Problems Daughter     No Known Problems Maternal Grandmother     No Known Problems Paternal Grandmother     No Known Problems Maternal Aunt     No Known Problems Paternal Aunt     No Known Problems Other     Breast cancer Neg Hx     Colon cancer Neg Hx     Colon polyps Neg Hx     BRCA 1/2 Neg Hx     Endometrial cancer Neg Hx     Ovarian cancer Neg Hx      SOCIAL HISTORY:  Social History     Socioeconomic History    Marital status:    Tobacco Use    Smoking status: Never    Smokeless tobacco: Never   Substance and Sexual Activity    Alcohol use: Yes     Comment: RARELY    Drug use: No    Sexual activity: Defer       REVIEW OF SYSTEMS:  Review of Systems   Constitutional:  Negative for fatigue and fever.   HENT:  Negative for trouble swallowing.    Respiratory:  Negative for cough and shortness of breath.    Cardiovascular:  Negative for chest pain, palpitations and leg swelling.   Gastrointestinal:  Negative for nausea and vomiting.   Genitourinary:  Negative for hematuria.   Musculoskeletal:  Negative for arthralgias and myalgias.   Skin:  Negative for rash, skin lesions and wound.   Neurological:  Negative for dizziness, syncope, memory  "problem and confusion.   Psychiatric/Behavioral:  Negative for suicidal ideas and depressed mood. The patient is not nervous/anxious.        /72   Pulse 81   Temp 97.6 °F (36.4 °C)   Resp 16   Ht 167.6 cm (66\")   Wt 59.9 kg (132 lb)   SpO2 99%   BMI 21.31 kg/m²  Body surface area is 1.68 meters squared.    Pain Score    10/19/23 0754   PainSc: 0-No pain         Physical Exam  Constitutional:       Appearance: Normal appearance.   HENT:      Head: Normocephalic and atraumatic.   Cardiovascular:      Rate and Rhythm: Normal rate and regular rhythm.   Pulmonary:      Effort: Pulmonary effort is normal.      Breath sounds: Normal breath sounds.   Chest:      Comments: Breast exam with no palpable findings to suggest recurrence.    Surgical and radiation skin changes to right breast.  Abdominal:      General: Bowel sounds are normal.      Palpations: Abdomen is soft.   Musculoskeletal:      Right lower leg: No edema.      Left lower leg: No edema.   Lymphadenopathy:      Upper Body:      Right upper body: No supraclavicular or axillary adenopathy.      Left upper body: No supraclavicular or axillary adenopathy.   Skin:     General: Skin is warm and dry.   Neurological:      Mental Status: She is alert and oriented to person, place, and time.   Psychiatric:         Attention and Perception: Attention normal.         Mood and Affect: Mood normal.         Judgment: Judgment normal.         Adriana Samuels reports a pain score of 0.  Given her pain assessment as noted, treatment options were discussed and the following options were decided upon as a follow-up plan to address the patient's pain:  No intervention indicated .          ASSESSMENT / PLAN    1. History of breast cancer    2. Osteopenia of lumbar spine           1.  History of Breast Cancer   Invasive ductal carcinoma: Stage: AJCC IIA (pT1b, pN1a, M0, G2) Tumor size 9 mm.  Hormone Status: %, NH 86%, and negative HER-2/gregory.  Baseline Node Status: " 1/2 sentinel node positive for metastasis.   Complications of Tumor: None.   Treatment status: Lumpectomy, Post adjuvant Taxotere and Cytoxan. Post adjuvant radiation 02/2019.   Declined adjuvant Femara.     - Mammogram November 2, 2022 with benign findings.  -Heme status stable   -Tumor markers 10/12/23:  Ca 27.29:  14.0,  CEA 2.22    2.  Osteopenia   -DEXA Scan 11/1/21 with lumbar spine with T score of -2.    -Pt declined bone strengthening medication  -Taking Calcium & Vit D  --Ordered Dexa Scan  -Vitamin D 58.7, Calcium 9.7    PLAN:  Stable for observation.    Continue annual mammogram which has been ordered by Dr. Lawrence  Return to office in 6 months  Patient will have preoffice labs for CBC with differential, CMP, CA27-29, CEA and vitamin D.   Continue current medications, treatment plans and follow up with PCP and any other providers.  Care discussed with patient.  Understanding expressed.  Patient agreeable with plan.        ACP discussion was held with the patient during this visit. Patient has an advance directive (not in EMR), copy requested.         Karen Tineo, APRN  10/19/2023

## 2023-11-03 ENCOUNTER — HOSPITAL ENCOUNTER (OUTPATIENT)
Dept: MAMMOGRAPHY | Facility: HOSPITAL | Age: 68
Discharge: HOME | End: 2023-11-03
Admitting: SPECIALIST
Payer: MEDICARE

## 2023-11-03 ENCOUNTER — HOSPITAL ENCOUNTER (OUTPATIENT)
Dept: BONE DENSITY | Facility: HOSPITAL | Age: 68
Discharge: HOME | End: 2023-11-03
Payer: MEDICARE

## 2023-11-03 DIAGNOSIS — M85.88 OSTEOPENIA OF LUMBAR SPINE: ICD-10-CM

## 2023-11-03 DIAGNOSIS — Z92.3 HISTORY OF RADIATION THERAPY: ICD-10-CM

## 2023-11-03 DIAGNOSIS — Z79.811 PROPHYLACTIC USE OF LETROZOLE (FEMARA): ICD-10-CM

## 2023-11-03 DIAGNOSIS — Z98.890 S/P LYMPH NODE BIOPSY: ICD-10-CM

## 2023-11-03 DIAGNOSIS — C50.411 MALIGNANT NEOPLASM OF UPPER-OUTER QUADRANT OF RIGHT FEMALE BREAST, UNSPECIFIED ESTROGEN RECEPTOR STATUS: ICD-10-CM

## 2023-11-03 DIAGNOSIS — Z85.3 HISTORY OF BREAST CANCER: ICD-10-CM

## 2023-11-03 DIAGNOSIS — Z98.890 S/P LUMPECTOMY, RIGHT BREAST: ICD-10-CM

## 2023-11-03 PROCEDURE — 77066 DX MAMMO INCL CAD BI: CPT

## 2023-11-03 PROCEDURE — G0279 TOMOSYNTHESIS, MAMMO: HCPCS

## 2023-11-03 PROCEDURE — 77080 DXA BONE DENSITY AXIAL: CPT

## 2023-11-08 ENCOUNTER — HOSPITAL ENCOUNTER (OUTPATIENT)
Dept: ULTRASOUND IMAGING | Facility: HOSPITAL | Age: 68
Discharge: HOME | End: 2023-11-08
Payer: MEDICARE

## 2023-11-08 ENCOUNTER — HOSPITAL ENCOUNTER (OUTPATIENT)
Dept: MAMMOGRAPHY | Facility: HOSPITAL | Age: 68
Discharge: HOME | End: 2023-11-08
Payer: MEDICARE

## 2023-11-08 DIAGNOSIS — R92.8 ABNORMAL MAMMOGRAM: ICD-10-CM

## 2023-11-08 PROCEDURE — A4648 IMPLANTABLE TISSUE MARKER: HCPCS

## 2023-11-08 PROCEDURE — 88305 TISSUE EXAM BY PATHOLOGIST: CPT | Performed by: SPECIALIST

## 2023-11-08 RX ORDER — LIDOCAINE HYDROCHLORIDE AND EPINEPHRINE 10; 10 MG/ML; UG/ML
10 INJECTION, SOLUTION INFILTRATION; PERINEURAL ONCE
Status: SHIPPED | OUTPATIENT
Start: 2023-11-08

## 2023-11-08 RX ORDER — LIDOCAINE HYDROCHLORIDE 10 MG/ML
10 INJECTION, SOLUTION INFILTRATION; PERINEURAL ONCE
Status: SHIPPED | OUTPATIENT
Start: 2023-11-08

## 2023-11-08 RX ORDER — LIDOCAINE HYDROCHLORIDE 10 MG/ML
10 INJECTION, SOLUTION INFILTRATION; PERINEURAL ONCE
Status: ACTIVE | OUTPATIENT
Start: 2023-11-08

## 2023-11-08 RX ORDER — LIDOCAINE HYDROCHLORIDE AND EPINEPHRINE 10; 10 MG/ML; UG/ML
10 INJECTION, SOLUTION INFILTRATION; PERINEURAL ONCE
Status: ACTIVE | OUTPATIENT
Start: 2023-11-08

## 2023-11-14 NOTE — H&P (VIEW-ONLY)
Office Established Patient Note:     Referring Provider: Dr. Arie Renteria    Chief complaint follow-up right breast lumpectomy 2018.    Subjective .     History of present illness:  Adriana Samuels is a 68 y.o. female currently 5 years out from a right upper outer breast lumpectomy and sentinel lymph node evaluation in October 2018.  She is 5 years out from surgery she has had no problems she has some changes from radiation with hypervascularization in the inferior aspect of the breast and anterior chest wall as well as dimpling in the central aspect of the lumpectomy site but otherwise she is doing well she had a very minimal tumor with 1 sentinel lymph node that was positive.  She received chemotherapy and radiation is done well.  She he had a T1N1 tumor.    With this noted Ms. Samuels had a mammogram accomplished the right side her previous operative site was clear but the left had a lesion at the 2 o'clock position.  To areas were evaluated and biopsied and both showed adenocarcinoma as well as some background intraductal carcinoma in situ.  The size of the area largest span was 14 mm as best as we can tell and we have discussed this she has a tumor on the left side, the contralateral side from her operative side and she is advised of surgical options.  We can look toward completing a lumpectomy and sentinel lymph node evaluation on this left side with postoperative radiation she has been through this on the right the other option is to proceed with bilateral mastectomy and treatment of the irradiated side and previous lumpectomy site on the right as well as this most recently diagnosed cancer side on the left.  Also we would complete sentinel lymph node evaluation on the left.  She is aware of the finding the risk and benefits and after full discussion of this and apparent understanding she gives her informed consent for bilateral mastectomy and sentinel lymph node evaluation on the left risk and benefits  discussed with full knowledge of this and apparent understanding she gives her informed consent for surgery.    Narrative & Impression   EXAMINATION:  MAMMO DIAGNOSTIC DIGITAL TOMOSYNTHESIS BILATERAL W CAD-   11/3/2023 8:05 AM CDT     CLINICAL HISTORY: C50.411-Malignant neoplasm of upper-outer quadrant of  right female breast; Z98.890-Other specified postprocedural states;  Z98.890-Other specified postprocedural states; Z92.3-Personal history of  irradiation; Z79.811-Long term (current) use of aromatase inhibitors.     FAMILY HISTORY OF BREAST CANCER: There is no family history of breast  cancer.     COMPARISON STUDIES: 11/2/2022, 11/1/2021 and 10/29/2020.     BREAST DENSITY: There are scattered fibroglandular densities that may  lower the sensitivity of mammography (Pattern B).     TECHNIQUE: Digital mammography was performed. 3-D Tomosynthesis was  performed. Standard images were supplemented with a right true lateral  2D and 3D sequence, left CC mag view, left true lateral mag view and  left true lateral 2D and 3D sequence.     FINDINGS: There is a cluster of microcalcifications in the upper outer  quadrant of the left breast at 2:00. The calcifications are suspicious.  They are somewhat fine and granular. They are tightly clustered. The  parenchymal pattern is otherwise stable. There are no new mass lesions.  There are stable postoperative changes in the upper outer right breast.  There is stable skin thickening on the right. Vascular calcification is  noted.        IMPRESSION:  1. Suspicious microcalcifications upper outer left breast. Biopsy is  recommended. BI-RADS 4.  2. Stable postoperative changes of the right breast.  3. Computer aided detection was utilized. 3-D Tomosynthesis was  performed.              This report was signed and finalized on 11/3/2023 8:57 AM CDT by Dr. Khris Johnston MD.               Pathologist:           Kehr, Elizabeth L, MD                                                       "Specimens:   1) - Breast, Left, left breast mass/distortion 2:00 4 cmfn, hx of right breast ca,no             family hx, sample taken by Dr. Hernandez @ 10:51 put in formalin @ 10:52                           2) - Breast, Left, left breast mass 2:00, 3cmfn, hx of right breast ca, no family hx,             sample taken by Dr. Hernandez @ 11:07 put in formalin @ 11:07                        Final Diagnosis  Left breast, designated \"mass/distortion 2:00\", 4 cm from the nipple, biopsy:  Invasive carcinoma of the breast, no special type, with areas of lobular pattern growth and the following features:     -Modified Dickinson grade 2  (score 3 for tubules, score 2 for nuclear grade, score 1 for mitoses).     -Lymphovascular space invasion: Not identified.     -Ductal carcinoma in situ: Present, solid and cribriform growth patterns, intermediate nuclear grade with single cell     necrosis and calcifications.     -Extent: Tumor involves 6 of 6 cores, largest span 14 mm.     -Additional findings: Calcifications associated with invasive carcinoma.     2.  Left breast, designated \"mass 2:00\", 3 cm from the nipple, biopsy:  Invasive carcinoma of the breast, no special type, with the following features:     -Modified Dickinson grade 2  (score 3 for tubules, score 2 for nuclear grade, score 1 for mitoses).     -Lymphovascular space invasion: Not identified.     -Ductal carcinoma in situ: Not identified.     -Extent: 3 of 6 fragmented cores, largest span 2 mm.      COMMENT:  ER, OR, HER2 and Ki-67 testing has been ordered from the reference laboratory and will be reported in an addendum.       Addendum electronically signed by Jordyn Tristan on 11/13/2023 at 0831  Electronically signed by Kehr, Elizabeth L, MD on 11/9/2023 at 0950  Gross Description  1. Breast, Left.   Received in a formalin filled container labeled with the patient's name, date of birth, and \"left breast mass/distortion 2:00, 4 cm from nipple\".  The specimen was " "excised at 1051 and has a cold ischemic time of 1 minute.  The specimen has a formalin fixation time of 9-1/2 hours.  The specimen consists of multiple yellow-pink soft tissue core biopsies ranging from 0.5 cm to 1.7 cm in length by 0.2 cm in diameter.  The cores are totally submitted in blocks 1A through 1C.     2. Breast, Left.   Received in a formalin filled container labeled with the patient's name, date of birth, and \"left breast mass 2:00, 3 cm from nipple\".  The specimen was excised at 11 of 7 and has a cold ischemic time of 1 minute.  The specimen has a formalin fixation time of 9 hours and 15 minutes.  The specimen consists of multiple yellow to gray, stringy core biopsies ranging from 0.2 cm to 1.3 cm in length by less than 0.1 cm in diameter.  The cores are totally submitted in blocks 2A through 2C.     Resulting [facility]     History    Past surgical history significant for previous lumpectomy and sentinel lymph node evaluation in 2018 colonoscopy, hydrocele excision and repair ablation of the cervix wrist fusion, a recent stereotactic biopsy from the left breast,    Medical problems significant for breast cancer of the right breast, herpes, history of radiation therapy to the right chest, mononucleosis, shingles.    Non-smoker nondrinker    Previously she worked in an office type situation  Past Medical History:   Diagnosis Date    Breast cancer 09/2018    right breast    Cancer     Drug therapy 09/2018-11/2018    Herpes zoster     Hx of radiation therapy 02/2019    right breast    Menorrhagia     Mononucleosis     PONV (postoperative nausea and vomiting)     Shingles    ,   Past Surgical History:   Procedure Laterality Date    BREAST BIOPSY Right 09/2018    positive    BREAST LUMPECTOMY Right 09/2018    lumpectomy with chemo and rad tx    BREAST LUMPECTOMY WITH SENTINEL NODE BIOPSY Right 9/10/2018    Procedure: RIGHT BREAST LUMPECTOMY WITH SENTINEL LYMPH NODE BIOPSY, INJECTION AND SCAN;  " Surgeon: Duglas Lawrence MD;  Location: [facility] OR;  Service: General    COLONOSCOPY N/A 6/6/2019    Procedure: COLONOSCOPY WITH ANESTHESIA;  Surgeon: Brock Raman MD;  Location: [facility] ENDOSCOPY;  Service: Gastroenterology    HYDROCELE EXCISION / REPAIR      LASER ABLATION OF THE CERVIX      WRIST FUSION     ,   Family History   Problem Relation Age of Onset    No Known Problems Mother     Skin cancer Father     No Known Problems Sister     No Known Problems Brother     No Known Problems Sister     No Known Problems Brother     No Known Problems Son     No Known Problems Son     No Known Problems Son     No Known Problems Daughter     No Known Problems Maternal Grandmother     No Known Problems Paternal Grandmother     No Known Problems Maternal Aunt     No Known Problems Paternal Aunt     No Known Problems Other     Breast cancer Neg Hx     Colon cancer Neg Hx     Colon polyps Neg Hx     BRCA 1/2 Neg Hx     Endometrial cancer Neg Hx     Ovarian cancer Neg Hx    ,   Social History     Tobacco Use    Smoking status: Never    Smokeless tobacco: Never   Substance Use Topics    Alcohol use: Yes     Comment: RARELY    Drug use: No   , (Not in a hospital admission)   and Allergies:  Bactrim [sulfamethoxazole-trimethoprim], Cortisone, Decadron [dexamethasone], Hydrocortisone, and Erythromycin    Current Outpatient Medications:     aspirin 81 MG chewable tablet, Chew 1 tablet Daily., Disp: , Rfl:     Biotin 1 MG capsule, Take 1 capsule by mouth Daily., Disp: , Rfl:     Calcium Carb-Cholecalciferol (CALCIUM 1000 + D PO), Take  by mouth Daily., Disp: , Rfl:     glucosamine sulfate 500 MG capsule capsule, Take  by mouth 3 (Three) Times a Day With Meals., Disp: , Rfl:     Multiple Vitamins-Minerals (MULTIVITAMIN WITH MINERALS) tablet tablet, Take 1 tablet by mouth Daily., Disp: , Rfl:     Probiotic Product (PROBIOTIC PO), Take 1 capsule by mouth Daily., Disp: , Rfl:     Current Facility-Administered Medications:      "lidocaine (XYLOCAINE) 1 % injection 10 mL, 10 mL, Subcutaneous, Once, Gabriel Hernandez MD    lidocaine (XYLOCAINE) 1 % injection 10 mL, 10 mL, Subcutaneous, Once, Gabriel Hernandez MD    lidocaine 1% - EPINEPHrine 1:998059 (XYLOCAINE W/EPI) 1 %-1:919825 injection 10 mL, 10 mL, Injection, Once, Gabriel Hernandez MD    lidocaine 1% - EPINEPHrine 1:908307 (XYLOCAINE W/EPI) 1 %-1:420408 injection 10 mL, 10 mL, Injection, Once, Gabriel Hernandez MD    Objective     Vital Signs   There were no vitals taken for this visit.     Physical Exam:  Well-developed well-nourished fit white female no acute distress awake alert and oriented x3.  Respirations clear and equal    Vascular exam regular rate and rhythm    Abdomen is soft, flat, nontender.    Breast exam shows status post radiation changes to the right breast the right is approximately 1/3-1/2 the size of the left, with a divot at the central aspect of the scar from radiation, neovascularization secondary to radiation, there is some bruising noted in the upper outer aspect of the left breast from the previous, recent stereotactic biopsy, shotty adenopathy noted in the axilla.Physical Exam  Chest:             Results Review:  Result Review :  Lab Results   Component Value Date    FINALDX  11/08/2023     Left breast, designated \"mass/distortion 2:00\", 4 cm from the nipple, biopsy:  Invasive carcinoma of the breast, no special type, with areas of lobular pattern growth and the following features:     -Modified Tillson grade 2  (score 3 for tubules, score 2 for nuclear grade, score 1 for mitoses).     -Lymphovascular space invasion: Not identified.     -Ductal carcinoma in situ: Present, solid and cribriform growth patterns, intermediate nuclear grade with single cell     necrosis and calcifications.     -Extent: Tumor involves 6 of 6 cores, largest span 14 mm.     -Additional findings: Calcifications associated with invasive carcinoma.    2.  Left breast, " "designated \"mass 2:00\", 3 cm from the nipple, biopsy:  Invasive carcinoma of the breast, no special type, with the following features:     -Modified [place] grade 2  (score 3 for tubules, score 2 for nuclear grade, score 1 for mitoses).     -Lymphovascular space invasion: Not identified.     -Ductal carcinoma in situ: Not identified.     -Extent: 3 of 6 fragmented cores, largest span 2 mm.     COMMENT:  ER, [place], HER2 and Ki-67 testing has been ordered from the reference laboratory and will be reported in an addendum.           KETTY  11/08/2023     1. Breast, Left.   Received in a formalin filled container labeled with the patient's name, date of birth, and \"left breast mass/distortion 2:00, 4 cm from nipple\".  The specimen was excised at 1051 and has a cold ischemic time of 1 minute.  The specimen has a formalin fixation time of 9-1/2 hours.  The specimen consists of multiple yellow-pink soft tissue core biopsies ranging from 0.5 cm to 1.7 cm in length by 0.2 cm in diameter.  The cores are totally submitted in blocks 1A through 1C.    2. Breast, Left.   Received in a formalin filled container labeled with the patient's name, date of birth, and \"left breast mass 2:00, 3 cm from nipple\".  The specimen was excised at 11 of 7 and has a cold ischemic time of 1 minute.  The specimen has a formalin fixation time of 9 hours and 15 minutes.  The specimen consists of multiple yellow to gray, stringy core biopsies ranging from 0.2 cm to 1.3 cm in length by less than 0.1 cm in diameter.  The cores are totally submitted in blocks 2A through 2C.           BMI is within normal parameters. No other follow-up for BMI required.    Assessment & Plan     Currently plan bilateral mastectomy and sentinel lymph node evaluation of the left axilla on 21 November risk and benefits discussed with full knowledge of this and apparent understanding she gives her informed consent for surgery.  We also discussed potential plastic surgery " reconstruction at present she does not desire this but this is always an option to discuss in the future.    Discussed BMI elevation and need to move toward a reduced BMI for health concerns she appears to understand she is a non smoker and her meds were reviewed.      Duglas Lawrence MD  11/14/23  11:58 CST

## 2023-11-14 NOTE — PROGRESS NOTES
Office Established Patient Note:     Referring Provider: Dr. Arie Renteria    Chief complaint follow-up right breast lumpectomy 2018.    Subjective .     History of present illness:  Adriana Samuels is a 68 y.o. female currently 5 years out from a right upper outer breast lumpectomy and sentinel lymph node evaluation in October 2018.  She is 5 years out from surgery she has had no problems she has some changes from radiation with hypervascularization in the inferior aspect of the breast and anterior chest wall as well as dimpling in the central aspect of the lumpectomy site but otherwise she is doing well she had a very minimal tumor with 1 sentinel lymph node that was positive.  She received chemotherapy and radiation is done well.  She he had a T1N1 tumor.    With this noted Ms. Samuels had a mammogram accomplished the right side her previous operative site was clear but the left had a lesion at the 2 o'clock position.  To areas were evaluated and biopsied and both showed adenocarcinoma as well as some background intraductal carcinoma in situ.  The size of the area largest span was 14 mm as best as we can tell and we have discussed this she has a tumor on the left side, the contralateral side from her operative side and she is advised of surgical options.  We can look toward completing a lumpectomy and sentinel lymph node evaluation on this left side with postoperative radiation she has been through this on the right the other option is to proceed with bilateral mastectomy and treatment of the irradiated side and previous lumpectomy site on the right as well as this most recently diagnosed cancer side on the left.  Also we would complete sentinel lymph node evaluation on the left.  She is aware of the finding the risk and benefits and after full discussion of this and apparent understanding she gives her informed consent for bilateral mastectomy and sentinel lymph node evaluation on the left risk and benefits  discussed with full knowledge of this and apparent understanding she gives her informed consent for surgery.    Narrative & Impression   EXAMINATION:  MAMMO DIAGNOSTIC DIGITAL TOMOSYNTHESIS BILATERAL W CAD-   11/3/2023 8:05 AM CDT     CLINICAL HISTORY: C50.411-Malignant neoplasm of upper-outer quadrant of  right female breast; Z98.890-Other specified postprocedural states;  Z98.890-Other specified postprocedural states; Z92.3-Personal history of  irradiation; Z79.811-Long term (current) use of aromatase inhibitors.     FAMILY HISTORY OF BREAST CANCER: There is no family history of breast  cancer.     COMPARISON STUDIES: 11/2/2022, 11/1/2021 and 10/29/2020.     BREAST DENSITY: There are scattered fibroglandular densities that may  lower the sensitivity of mammography (Pattern B).     TECHNIQUE: Digital mammography was performed. 3-D Tomosynthesis was  performed. Standard images were supplemented with a right true lateral  2D and 3D sequence, left CC mag view, left true lateral mag view and  left true lateral 2D and 3D sequence.     FINDINGS: There is a cluster of microcalcifications in the upper outer  quadrant of the left breast at 2:00. The calcifications are suspicious.  They are somewhat fine and granular. They are tightly clustered. The  parenchymal pattern is otherwise stable. There are no new mass lesions.  There are stable postoperative changes in the upper outer right breast.  There is stable skin thickening on the right. Vascular calcification is  noted.        IMPRESSION:  1. Suspicious microcalcifications upper outer left breast. Biopsy is  recommended. BI-RADS 4.  2. Stable postoperative changes of the right breast.  3. Computer aided detection was utilized. 3-D Tomosynthesis was  performed.              This report was signed and finalized on 11/3/2023 8:57 AM CDT by Dr. Khris Johnston MD.               Pathologist:           Kehr, Elizabeth L, MD                                                       "Specimens:   1) - Breast, Left, left breast mass/distortion 2:00 4 cmfn, hx of right breast ca,no             family hx, sample taken by Dr. Hernandez @ 10:51 put in formalin @ 10:52                           2) - Breast, Left, left breast mass 2:00, 3cmfn, hx of right breast ca, no family hx,             sample taken by Dr. Hernandez @ 11:07 put in formalin @ 11:07                        Final Diagnosis  Left breast, designated \"mass/distortion 2:00\", 4 cm from the nipple, biopsy:  Invasive carcinoma of the breast, no special type, with areas of lobular pattern growth and the following features:     -Modified [place] grade 2  (score 3 for tubules, score 2 for nuclear grade, score 1 for mitoses).     -Lymphovascular space invasion: Not identified.     -Ductal carcinoma in situ: Present, solid and cribriform growth patterns, intermediate nuclear grade with single cell     necrosis and calcifications.     -Extent: Tumor involves 6 of 6 cores, largest span 14 mm.     -Additional findings: Calcifications associated with invasive carcinoma.     2.  Left breast, designated \"mass 2:00\", 3 cm from the nipple, biopsy:  Invasive carcinoma of the breast, no special type, with the following features:     -Modified [place] grade 2  (score 3 for tubules, score 2 for nuclear grade, score 1 for mitoses).     -Lymphovascular space invasion: Not identified.     -Ductal carcinoma in situ: Not identified.     -Extent: 3 of 6 fragmented cores, largest span 2 mm.      COMMENT:  ER, MI, HER2 and Ki-67 testing has been ordered from the reference laboratory and will be reported in an addendum.       Addendum electronically signed by Jordyn Tristan on 11/13/2023 at 0831  Electronically signed by Kehr, Elizabeth L, MD on 11/9/2023 at 0950  Gross Description  1. Breast, Left.   Received in a formalin filled container labeled with the patient's name, date of birth, and \"left breast mass/distortion 2:00, 4 cm from nipple\".  The specimen was " "excised at 1051 and has a cold ischemic time of 1 minute.  The specimen has a formalin fixation time of 9-1/2 hours.  The specimen consists of multiple yellow-pink soft tissue core biopsies ranging from 0.5 cm to 1.7 cm in length by 0.2 cm in diameter.  The cores are totally submitted in blocks 1A through 1C.     2. Breast, Left.   Received in a formalin filled container labeled with the patient's name, date of birth, and \"left breast mass 2:00, 3 cm from nipple\".  The specimen was excised at 11 of 7 and has a cold ischemic time of 1 minute.  The specimen has a formalin fixation time of 9 hours and 15 minutes.  The specimen consists of multiple yellow to gray, stringy core biopsies ranging from 0.2 cm to 1.3 cm in length by less than 0.1 cm in diameter.  The cores are totally submitted in blocks 2A through 2C.     Resulting [facility]     History    Past surgical history significant for previous lumpectomy and sentinel lymph node evaluation in 2018 colonoscopy, hydrocele excision and repair ablation of the cervix wrist fusion, a recent stereotactic biopsy from the left breast,    Medical problems significant for breast cancer of the right breast, herpes, history of radiation therapy to the right chest, mononucleosis, shingles.    Non-smoker nondrinker    Previously she worked in an office type situation  Past Medical History:   Diagnosis Date    Breast cancer 09/2018    right breast    Cancer     Drug therapy 09/2018-11/2018    Herpes zoster     Hx of radiation therapy 02/2019    right breast    Menorrhagia     Mononucleosis     PONV (postoperative nausea and vomiting)     Shingles    ,   Past Surgical History:   Procedure Laterality Date    BREAST BIOPSY Right 09/2018    positive    BREAST LUMPECTOMY Right 09/2018    lumpectomy with chemo and rad tx    BREAST LUMPECTOMY WITH SENTINEL NODE BIOPSY Right 9/10/2018    Procedure: RIGHT BREAST LUMPECTOMY WITH SENTINEL LYMPH NODE BIOPSY, INJECTION AND SCAN;  " Surgeon: Duglas Lawrence MD;  Location: [facility] OR;  Service: General    COLONOSCOPY N/A 6/6/2019    Procedure: COLONOSCOPY WITH ANESTHESIA;  Surgeon: Brock Raman MD;  Location: [facility] ENDOSCOPY;  Service: Gastroenterology    HYDROCELE EXCISION / REPAIR      LASER ABLATION OF THE CERVIX      WRIST FUSION     ,   Family History   Problem Relation Age of Onset    No Known Problems Mother     Skin cancer Father     No Known Problems Sister     No Known Problems Brother     No Known Problems Sister     No Known Problems Brother     No Known Problems Son     No Known Problems Son     No Known Problems Son     No Known Problems Daughter     No Known Problems Maternal Grandmother     No Known Problems Paternal Grandmother     No Known Problems Maternal Aunt     No Known Problems Paternal Aunt     No Known Problems Other     Breast cancer Neg Hx     Colon cancer Neg Hx     Colon polyps Neg Hx     BRCA 1/2 Neg Hx     Endometrial cancer Neg Hx     Ovarian cancer Neg Hx    ,   Social History     Tobacco Use    Smoking status: Never    Smokeless tobacco: Never   Substance Use Topics    Alcohol use: Yes     Comment: RARELY    Drug use: No   , (Not in a hospital admission)   and Allergies:  Bactrim [sulfamethoxazole-trimethoprim], Cortisone, Decadron [dexamethasone], Hydrocortisone, and Erythromycin    Current Outpatient Medications:     aspirin 81 MG chewable tablet, Chew 1 tablet Daily., Disp: , Rfl:     Biotin 1 MG capsule, Take 1 capsule by mouth Daily., Disp: , Rfl:     Calcium Carb-Cholecalciferol (CALCIUM 1000 + D PO), Take  by mouth Daily., Disp: , Rfl:     glucosamine sulfate 500 MG capsule capsule, Take  by mouth 3 (Three) Times a Day With Meals., Disp: , Rfl:     Multiple Vitamins-Minerals (MULTIVITAMIN WITH MINERALS) tablet tablet, Take 1 tablet by mouth Daily., Disp: , Rfl:     Probiotic Product (PROBIOTIC PO), Take 1 capsule by mouth Daily., Disp: , Rfl:     Current Facility-Administered Medications:      "lidocaine (XYLOCAINE) 1 % injection 10 mL, 10 mL, Subcutaneous, Once, Gabriel Hernandez MD    lidocaine (XYLOCAINE) 1 % injection 10 mL, 10 mL, Subcutaneous, Once, Gabriel Hernandez MD    lidocaine 1% - EPINEPHrine 1:610578 (XYLOCAINE W/EPI) 1 %-1:619593 injection 10 mL, 10 mL, Injection, Once, Gabriel Hernandez MD    lidocaine 1% - EPINEPHrine 1:273327 (XYLOCAINE W/EPI) 1 %-1:751951 injection 10 mL, 10 mL, Injection, Once, Gabriel Hernandez MD    Objective     Vital Signs   There were no vitals taken for this visit.     Physical Exam:  Well-developed well-nourished fit white female no acute distress awake alert and oriented x3.  Respirations clear and equal    Vascular exam regular rate and rhythm    Abdomen is soft, flat, nontender.    Breast exam shows status post radiation changes to the right breast the right is approximately 1/3-1/2 the size of the left, with a divot at the central aspect of the scar from radiation, neovascularization secondary to radiation, there is some bruising noted in the upper outer aspect of the left breast from the previous, recent stereotactic biopsy, shotty adenopathy noted in the axilla.Physical Exam  Chest:             Results Review:  Result Review :  Lab Results   Component Value Date    FINALDX  11/08/2023     Left breast, designated \"mass/distortion 2:00\", 4 cm from the nipple, biopsy:  Invasive carcinoma of the breast, no special type, with areas of lobular pattern growth and the following features:     -Modified [place] grade 2  (score 3 for tubules, score 2 for nuclear grade, score 1 for mitoses).     -Lymphovascular space invasion: Not identified.     -Ductal carcinoma in situ: Present, solid and cribriform growth patterns, intermediate nuclear grade with single cell     necrosis and calcifications.     -Extent: Tumor involves 6 of 6 cores, largest span 14 mm.     -Additional findings: Calcifications associated with invasive carcinoma.    2.  Left breast, " "designated \"mass 2:00\", 3 cm from the nipple, biopsy:  Invasive carcinoma of the breast, no special type, with the following features:     -Modified [place] grade 2  (score 3 for tubules, score 2 for nuclear grade, score 1 for mitoses).     -Lymphovascular space invasion: Not identified.     -Ductal carcinoma in situ: Not identified.     -Extent: 3 of 6 fragmented cores, largest span 2 mm.     COMMENT:  ER, RI, HER2 and Ki-67 testing has been ordered from the reference laboratory and will be reported in an addendum.           KETTY  11/08/2023     1. Breast, Left.   Received in a formalin filled container labeled with the patient's name, date of birth, and \"left breast mass/distortion 2:00, 4 cm from nipple\".  The specimen was excised at 1051 and has a cold ischemic time of 1 minute.  The specimen has a formalin fixation time of 9-1/2 hours.  The specimen consists of multiple yellow-pink soft tissue core biopsies ranging from 0.5 cm to 1.7 cm in length by 0.2 cm in diameter.  The cores are totally submitted in blocks 1A through 1C.    2. Breast, Left.   Received in a formalin filled container labeled with the patient's name, date of birth, and \"left breast mass 2:00, 3 cm from nipple\".  The specimen was excised at 11 of 7 and has a cold ischemic time of 1 minute.  The specimen has a formalin fixation time of 9 hours and 15 minutes.  The specimen consists of multiple yellow to gray, stringy core biopsies ranging from 0.2 cm to 1.3 cm in length by less than 0.1 cm in diameter.  The cores are totally submitted in blocks 2A through 2C.           BMI is within normal parameters. No other follow-up for BMI required.    Assessment & Plan     Currently plan bilateral mastectomy and sentinel lymph node evaluation of the left axilla on 21 November risk and benefits discussed with full knowledge of this and apparent understanding she gives her informed consent for surgery.  We also discussed potential plastic surgery " reconstruction at present she does not desire this but this is always an option to discuss in the future.    Discussed BMI elevation and need to move toward a reduced BMI for health concerns she appears to understand she is a non smoker and her meds were reviewed.      Duglas Lawrence MD  11/14/23  11:58 CST

## 2023-11-15 ENCOUNTER — OFFICE VISIT (OUTPATIENT)
Dept: SURGERY | Facility: CLINIC | Age: 68
End: 2023-11-15
Payer: MEDICARE

## 2023-11-15 VITALS
HEIGHT: 66 IN | BODY MASS INDEX: 21.18 KG/M2 | SYSTOLIC BLOOD PRESSURE: 116 MMHG | DIASTOLIC BLOOD PRESSURE: 70 MMHG | WEIGHT: 131.8 LBS

## 2023-11-15 DIAGNOSIS — Z98.890 S/P LYMPH NODE BIOPSY: ICD-10-CM

## 2023-11-15 DIAGNOSIS — Z98.890 S/P LUMPECTOMY, RIGHT BREAST: ICD-10-CM

## 2023-11-15 DIAGNOSIS — Z85.3 HISTORY OF LEFT BREAST CANCER: ICD-10-CM

## 2023-11-15 DIAGNOSIS — C50.411 MALIGNANT NEOPLASM OF UPPER-OUTER QUADRANT OF RIGHT FEMALE BREAST, UNSPECIFIED ESTROGEN RECEPTOR STATUS: Primary | ICD-10-CM

## 2023-11-15 PROCEDURE — 1159F MED LIST DOCD IN RCRD: CPT | Performed by: SPECIALIST

## 2023-11-15 PROCEDURE — 1160F RVW MEDS BY RX/DR IN RCRD: CPT | Performed by: SPECIALIST

## 2023-11-15 PROCEDURE — 99214 OFFICE O/P EST MOD 30 MIN: CPT | Performed by: SPECIALIST

## 2023-11-15 RX ORDER — ONDANSETRON 2 MG/ML
4 INJECTION INTRAMUSCULAR; INTRAVENOUS EVERY 6 HOURS PRN
OUTPATIENT
Start: 2023-11-15

## 2023-11-15 RX ORDER — SODIUM CHLORIDE 9 MG/ML
100 INJECTION, SOLUTION INTRAVENOUS CONTINUOUS
OUTPATIENT
Start: 2023-11-15

## 2023-11-15 NOTE — PATIENT INSTRUCTIONS
Nice to see you again Ms. Samuels, we will look toward treating the breast tumor on Tuesday and removal of the right breast as well you will be at least in the hospital overnight and home on Wednesday if you are doing well.  Surgery; Tuesday 11/21/23 arrive at 6:30  Pre-work; Thursday; 11/16/23 arrive at 11:30 am  (No fasting required this day)  NPO after midnight the night before surgery.  Check in at the main registration desk located at the main entrance of the hospital on both days.  No biotin or vitamins after Friday 11/17/23

## 2023-11-16 ENCOUNTER — PRE-ADMISSION TESTING (OUTPATIENT)
Dept: PREADMISSION TESTING | Facility: HOSPITAL | Age: 68
End: 2023-11-16
Payer: MEDICARE

## 2023-11-16 VITALS
SYSTOLIC BLOOD PRESSURE: 135 MMHG | WEIGHT: 131.61 LBS | DIASTOLIC BLOOD PRESSURE: 73 MMHG | OXYGEN SATURATION: 98 % | RESPIRATION RATE: 18 BRPM | BODY MASS INDEX: 21.15 KG/M2 | HEIGHT: 66 IN | HEART RATE: 93 BPM

## 2023-11-16 LAB
ANION GAP SERPL CALCULATED.3IONS-SCNC: 6 MMOL/L (ref 5–15)
BUN SERPL-MCNC: 15 MG/DL (ref 8–23)
BUN/CREAT SERPL: 24.6 (ref 7–25)
CALCIUM SPEC-SCNC: 9.1 MG/DL (ref 8.6–10.5)
CHLORIDE SERPL-SCNC: 106 MMOL/L (ref 98–107)
CO2 SERPL-SCNC: 31 MMOL/L (ref 22–29)
CREAT SERPL-MCNC: 0.61 MG/DL (ref 0.57–1)
DEPRECATED RDW RBC AUTO: 43.1 FL (ref 37–54)
EGFRCR SERPLBLD CKD-EPI 2021: 96.9 ML/MIN/1.73
ERYTHROCYTE [DISTWIDTH] IN BLOOD BY AUTOMATED COUNT: 13.3 % (ref 12.3–15.4)
GLUCOSE SERPL-MCNC: 108 MG/DL (ref 65–99)
HCT VFR BLD AUTO: 40.8 % (ref 34–46.6)
HGB BLD-MCNC: 12.7 G/DL (ref 12–15.9)
MCH RBC QN AUTO: 27.4 PG (ref 26.6–33)
MCHC RBC AUTO-ENTMCNC: 31.1 G/DL (ref 31.5–35.7)
MCV RBC AUTO: 88.1 FL (ref 79–97)
PLATELET # BLD AUTO: 225 10*3/MM3 (ref 140–450)
PMV BLD AUTO: 9.9 FL (ref 6–12)
POTASSIUM SERPL-SCNC: 4.4 MMOL/L (ref 3.5–5.2)
RBC # BLD AUTO: 4.63 10*6/MM3 (ref 3.77–5.28)
SODIUM SERPL-SCNC: 143 MMOL/L (ref 136–145)
WBC NRBC COR # BLD AUTO: 5.3 10*3/MM3 (ref 3.4–10.8)

## 2023-11-16 PROCEDURE — 85027 COMPLETE CBC AUTOMATED: CPT

## 2023-11-16 PROCEDURE — 80048 BASIC METABOLIC PNL TOTAL CA: CPT

## 2023-11-16 PROCEDURE — 36415 COLL VENOUS BLD VENIPUNCTURE: CPT

## 2023-11-16 NOTE — DISCHARGE INSTRUCTIONS
How to Use Chlorhexidine Before Surgery  Chlorhexidine gluconate (CHG) is a germ-killing (antiseptic) solution that is used to clean the skin. It can get rid of the bacteria that normally live on the skin and can keep them away for about 24 hours. To clean your skin with CHG, you may be given:  A CHG solution to use in the shower or as part of a sponge bath.  A prepackaged cloth that contains CHG.  Cleaning your skin with CHG may help lower the risk for infection:  While you are staying in the intensive care unit of the hospital.  If you have a vascular access, such as a central line, to provide short-term or long-term access to your veins.  If you have a catheter to drain urine from your bladder.  If you are on a ventilator. A ventilator is a machine that helps you breathe by moving air in and out of your lungs.  After surgery.  What are the risks?  Risks of using CHG include:  A skin reaction.  Hearing loss, if CHG gets in your ears and you have a perforated eardrum.  Eye injury, if CHG gets in your eyes and is not rinsed out.  The CHG product catching fire.  Make sure that you avoid smoking and flames after applying CHG to your skin.  Do not use CHG:  If you have a chlorhexidine allergy or have previously reacted to chlorhexidine.  On babies younger than 2 months of age.  How to use CHG solution  Use CHG only as told by your health care provider, and follow the instructions on the label.  Use the full amount of CHG as directed. Usually, this is one bottle.  During a shower    Follow these steps when using CHG solution during a shower (unless your health care provider gives you different instructions):  Start the shower.  Use your normal soap and shampoo to wash your face and hair.  Turn off the shower or move out of the shower stream.  Pour the CHG onto a clean washcloth. Do not use any type of brush or rough-edged sponge.  Starting at your neck, lather your body down to your toes. Make sure you follow these  instructions:  If you will be having surgery, pay special attention to the part of your body where you will be having surgery. Scrub this area for at least 1 minute.  Do not use CHG on your head or face. If the solution gets into your ears or eyes, rinse them well with water.  Avoid your genital area.  Avoid any areas of skin that have broken skin, cuts, or scrapes.  Scrub your back and under your arms. Make sure to wash skin folds.  Let the lather sit on your skin for 1-2 minutes or as long as told by your health care provider.  Thoroughly rinse your entire body in the shower. Make sure that all body creases and crevices are rinsed well.  Dry off with a clean towel. Do not put any substances on your body afterward--such as powder, lotion, or perfume--unless you are told to do so by your health care provider. Only use lotions that are recommended by the .  Put on clean clothes or pajamas.  If it is the night before your surgery, sleep in clean sheets.     During a sponge bath  Follow these steps when using CHG solution during a sponge bath (unless your health care provider gives you different instructions):  Use your normal soap and shampoo to wash your face and hair.  Pour the CHG onto a clean washcloth.  Starting at your neck, lather your body down to your toes. Make sure you follow these instructions:  If you will be having surgery, pay special attention to the part of your body where you will be having surgery. Scrub this area for at least 1 minute.  Do not use CHG on your head or face. If the solution gets into your ears or eyes, rinse them well with water.  Avoid your genital area.  Avoid any areas of skin that have broken skin, cuts, or scrapes.  Scrub your back and under your arms. Make sure to wash skin folds.  Let the lather sit on your skin for 1-2 minutes or as long as told by your health care provider.  Using a different clean, wet washcloth, thoroughly rinse your entire body. Make sure that  all body creases and crevices are rinsed well.  Dry off with a clean towel. Do not put any substances on your body afterward--such as powder, lotion, or perfume--unless you are told to do so by your health care provider. Only use lotions that are recommended by the .  Put on clean clothes or pajamas.  If it is the night before your surgery, sleep in clean sheets.  How to use CHG prepackaged cloths  Only use CHG cloths as told by your health care provider, and follow the instructions on the label.  Use the CHG cloth on clean, dry skin.  Do not use the CHG cloth on your head or face unless your health care provider tells you to.  When washing with the CHG cloth:  Avoid your genital area.  Avoid any areas of skin that have broken skin, cuts, or scrapes.  Before surgery    Follow these steps when using a CHG cloth to clean before surgery (unless your health care provider gives you different instructions):  Using the CHG cloth, vigorously scrub the part of your body where you will be having surgery. Scrub using a back-and-forth motion for 3 minutes. The area on your body should be completely wet with CHG when you are done scrubbing.  Do not rinse. Discard the cloth and let the area air-dry. Do not put any substances on the area afterward, such as powder, lotion, or perfume.  Put on clean clothes or pajamas.  If it is the night before your surgery, sleep in clean sheets.     For general bathing  Follow these steps when using CHG cloths for general bathing (unless your health care provider gives you different instructions).  Use a separate CHG cloth for each area of your body. Make sure you wash between any folds of skin and between your fingers and toes. Wash your body in the following order, switching to a new cloth after each step:  The front of your neck, shoulders, and chest.  Both of your arms, under your arms, and your hands.  Your stomach and groin area, avoiding the genitals.  Your right leg and  foot.  Your left leg and foot.  The back of your neck, your back, and your buttocks.  Do not rinse. Discard the cloth and let the area air-dry. Do not put any substances on your body afterward--such as powder, lotion, or perfume--unless you are told to do so by your health care provider. Only use lotions that are recommended by the .  Put on clean clothes or pajamas.  Contact a health care provider if:  Your skin gets irritated after scrubbing.  You have questions about using your solution or cloth.  You swallow any chlorhexidine. Call your local poison control center (1-791.936.2758 in the [place]  Get help right away if:  Your eyes itch badly, or they become very red or swollen.  Your skin itches badly and is red or swollen.  Your hearing changes.  You have trouble seeing.  You have swelling or tingling in your mouth or throat.  You have trouble breathing.  These symptoms may represent a serious problem that is an emergency. Do not wait to see if the symptoms will go away. Get medical help right away. Call your local emergency services (913 in the [place] Do not drive yourself to the hospital.  Summary  Chlorhexidine gluconate (CHG) is a germ-killing (antiseptic) solution that is used to clean the skin. Cleaning your skin with CHG may help to lower your risk for infection.  You may be given CHG to use for bathing. It may be in a bottle or in a prepackaged cloth to use on your skin. Carefully follow your health care provider's instructions and the instructions on the product label.  Do not use CHG if you have a chlorhexidine allergy.  Contact your health care provider if your skin gets irritated after scrubbing.  This information is not intended to replace advice given to you by your health care provider. Make sure you discuss any questions you have with your health care provider.  Document Revised: 04/17/2023 Document Reviewed: 02/28/2022  Elsevier Patient Education © 2023 [employer]      Before you  come to the hospital        Arrival time: AS DIRECTED BY OFFICE     YOU MAY TAKE THE FOLLOWING MEDICATION(S) THE MORNING OF SURGERY WITH A SIP OF WATER: ***           ALL OTHER HOME MEDICATION CHECK WITH YOUR PHYSICIAN (especially if   you are taking diabetes medicines or blood thinners)    Do not take any Erectile Dysfunction medications (EX: CIALIS, VIAGRA) 24 hours prior to surgery.      If you were given and instructed to use a germ- killing soap, use as directed the night before surgery and again the morning of surgery or as directed by your surgeon. (Use one-half of the bottle with each shower.)   See attached information for How to Use Chlorhexidine for Bathing if applicable.            Eating and drinking restrictions prior to scheduled arrival time    2 Hours before arrival time STOP   Drinking Clear liquids (water, black coffee-NO CREAM,  apple juice-no pulp)    Clear Liquids    Water and flavored water                                                                      Clear Fruit juices, such as cranberry juice and apple juice.  Black coffee (NO cream of any kind, including powdered).  Plain tea  Clear bouillon or broth.  Flavored gelatin.  Soda.  Gatorade or Powerade.    8 Hours before arrival time STOP   All food, full liquids, and dairy products  Full liquid examples  Juices that have pulp.  Frozen ice pops that contain fruit pieces.  Coffee with creamer  Milk.  Yogurt.    (It is extremely important that you follow these guidelines to prevent delay or cancelation of your procedure)                       MANAGING PAIN AFTER SURGERY    We know you are probably wondering what your pain will be like after surgery.  Following surgery it is unrealistic to expect you will not have pain.   Pain is how our bodies let us know that something is wrong or cautions us to be careful.  That said, our goal is to make your pain tolerable.    Methods we may use to treat your pain include (oral or IV medications, PCAs,  epidurals, nerve blocks, etc.)   While some procedures require IV pain medications for a short time after surgery, transitioning to pain medications by mouth allows for better management of pain.   Your nurse will encourage you to take oral pain medications whenever possible.  IV medications work almost immediately, but only last a short while.  Taking medications by mouth allows for a more constant level of medication in your blood stream for a longer period of time.      Once your pain is out of control it is harder to get back under control.  It is important you are aware when your next dose of pain medication is due.  If you are admitted, your nurse may write the time of your next dose on the white board in your room to help you remember.      We are interested in your pain and encourage you to inform us about aggravating factors during your visit.   Many times a simple repositioning every few hours can make a big difference.    If your physician says it is okay, do not let your pain prevent you from getting out of bed. Be sure to call your nurse for assistance prior to getting up so you do not fall.      Before surgery, please decide your tolerable pain goal.  These faces help describe the pain ratings we use on a 0-10 scale.   Be prepared to tell us your goal and whether or not you take pain or anxiety medications at home.          Preparing for Surgery  Preparing for surgery is an important part of your care. It can make things go more smoothly and help you avoid complications. The steps leading up to surgery may vary among hospitals. Follow all instructions given to you by your health care providers. Ask questions if you do not understand something. Talk about any concerns that you have.  Here are some questions to consider asking before your surgery:  If my surgery is not an emergency (is elective), when would be the best time to have the surgery?  What arrangements do I need to make for work, home, or  school?  What will my recovery be like? How long will it be before I can return to normal activities?  Will I need to prepare my home? Will I need to arrange care for me or my children?  Should I expect to have pain after surgery? What are my pain management options? Are there nonmedical options that I can try for pain?  Tell a health care provider about:  Any allergies you have.  All medicines you are taking, including vitamins, herbs, eye drops, creams, and over-the-counter medicines.  Any problems you or family members have had with anesthetic medicines.  Any blood disorders you have.  Any surgeries you have had.  Any medical conditions you have.  Whether you are pregnant or may be pregnant.  What are the risks?  The risks and complications of surgery depend on the specific procedure that you have. Discuss all the risks with your health care providers before your surgery. Ask about common surgical complications, which may include:  Infection.  Bleeding or a need for blood replacement (transfusion).  Allergic reactions to medicines.  Damage to surrounding nerves, tissues, or structures.  A blood clot.  Scarring.  Failure of the surgery to correct the problem.  Follow these instructions before the procedure:  Several days or weeks before your procedure  You may have a physical exam by your primary health care provider to make sure it is safe for you to have surgery.  You may have testing. This may include a chest X-ray, blood and urine tests, electrocardiogram (ECG), or other testing.  Ask your health care provider about:  Changing or stopping your regular medicines. This is especially important if you are taking diabetes medicines or blood thinners.  Taking medicines such as aspirin and ibuprofen. These medicines can thin your blood. Do not take these medicines unless your health care provider tells you to take them.  Taking over-the-counter medicines, vitamins, herbs, and supplements.  Do not use any products  that contain nicotine or tobacco, such as cigarettes and e-cigarettes. If you need help quitting, ask your health care provider.  Avoid alcohol.  Ask your health care provider if there are exercises you can do to prepare for surgery.  Eat a healthy diet.   Plan to have someone 18 years of age or older to take you home from the hospital. We will need to verify your ride on the morning of surgery if you are being discharged home on the same day. Tell your ride to be expecting a call from the hospital prior to your procedure.   Plan to have a responsible adult care for you for at least 24 hours after you leave the hospital or clinic. This is important.  The day before your procedure  You may be given antibiotic medicine to take by mouth to help prevent infection. Take it as told by your health care provider.  You may be asked to shower with a germ-killing soap.  Follow instructions from your health care provider about eating and drinking restrictions. This includes gum, mints and hard candy.  Pack comfortable clothes according to your procedure.   The day of your procedure  You may need to take another shower with a germ-killing soap before you leave home in the morning.  With a small sip of water, take only the medicines that you are told to take.  Remove all jewelry including rings.   Leave anything you consider valuable at home except hearing aids if needed.  You do not need to bring your home medications into the hospital.   Do not wear any makeup, nail polish, powder, deodorant, lotion, hair accessories, or anything on your skin or body except your clothes.  If you will be staying in the hospital, bring a case to hold your glasses, contacts, or dentures. You may also want to bring your robe and non-skid footwear.       (Do not use denture adhesives since you will be asked to remove them during  surgery).   If you wear oxygen at home, bring it with you the day of surgery.  If instructed by your health care  provider, bring your sleep apnea device with you on the day of your surgery (if this applies to you).  You may want to leave your suitcase and sleep apnea device in the car until after surgery.   Arrive at the hospital as scheduled.  Bring a friend or family member with you who can help to answer questions and be present while you meet with your health care provider.  At the hospital  When you arrive at the hospital:  Go to registration located at the main entrance of the hospital. You will be registered and given a beeper and a sticker sheet. Take the stickers to the Outpatient nurses desk and place in the black tray. This is to notify staff that you have arrived. Then return to the lobby to wait.   When your beeper lights up and vibrates proceed through the double doors, under the stairs, and a member of the Outpatient Surgery staff will escort you to your preoperative room.  You may have to wear compression sleeves. These help to prevent blood clots and reduce swelling in your legs.  An IV may be inserted into one of your veins.              In the operating room, you may be given one or more of the following:        A medicine to help you relax (sedative).        A medicine to numb the area (local anesthetic).        A medicine to make you fall asleep (general anesthetic).        A medicine that is injected into an area of your body to numb everything below the                      injection site (regional anesthetic).  You may be given an antibiotic through your IV to help prevent infection.  Your surgical site will be marked or identified.    Contact a health care provider if you:  Develop a fever of more than 100.4°F (38°C) or other feelings of illness during the 48 hours before your surgery.  Have symptoms that get worse.  Have questions or concerns about your surgery.  Summary  Preparing for surgery can make the procedure go more smoothly and lower your risk of complications.  Before surgery, make a list of  questions and concerns to discuss with your surgeon. Ask about the risks and possible complications.  In the days or weeks before your surgery, follow all instructions from your health care provider. You may need to stop smoking, avoid alcohol, follow eating restrictions, and change or stop your regular medicines.  Contact your surgeon if you develop a fever or other signs of illness during the few days before your surgery.  This information is not intended to replace advice given to you by your health care provider. Make sure you discuss any questions you have with your health care provider.  Document Revised: 12/21/2018 Document Reviewed: 10/23/2018  Elsevier Patient Education © 2021 [employer]

## 2023-11-17 ENCOUNTER — TELEPHONE (OUTPATIENT)
Dept: SURGERY | Facility: CLINIC | Age: 68
End: 2023-11-17
Payer: MEDICARE

## 2023-11-17 LAB
LAB AP CASE REPORT: NORMAL
Lab: NORMAL
PATH REPORT.FINAL DX SPEC: NORMAL
PATH REPORT.GROSS SPEC: NORMAL

## 2023-11-21 ENCOUNTER — ANESTHESIA EVENT (OUTPATIENT)
Dept: PERIOP | Facility: HOSPITAL | Age: 68
End: 2023-11-21
Payer: MEDICARE

## 2023-11-21 ENCOUNTER — HOSPITAL ENCOUNTER (OUTPATIENT)
Dept: NUCLEAR MEDICINE | Facility: HOSPITAL | Age: 68
Discharge: HOME | End: 2023-11-21
Payer: MEDICARE

## 2023-11-21 ENCOUNTER — HOSPITAL ENCOUNTER (OUTPATIENT)
Facility: HOSPITAL | Age: 68
Discharge: HOME | End: 2023-11-22
Attending: SPECIALIST | Admitting: SPECIALIST
Payer: MEDICARE

## 2023-11-21 ENCOUNTER — ANESTHESIA (OUTPATIENT)
Dept: PERIOP | Facility: HOSPITAL | Age: 68
End: 2023-11-21
Payer: MEDICARE

## 2023-11-21 DIAGNOSIS — Z98.890 S/P LYMPH NODE BIOPSY: ICD-10-CM

## 2023-11-21 DIAGNOSIS — Z98.890 S/P LUMPECTOMY, RIGHT BREAST: ICD-10-CM

## 2023-11-21 DIAGNOSIS — Z85.3 HISTORY OF LEFT BREAST CANCER: ICD-10-CM

## 2023-11-21 DIAGNOSIS — C50.411 MALIGNANT NEOPLASM OF UPPER-OUTER QUADRANT OF RIGHT FEMALE BREAST, UNSPECIFIED ESTROGEN RECEPTOR STATUS: ICD-10-CM

## 2023-11-21 PROCEDURE — 38792 RA TRACER ID OF SENTINL NODE: CPT

## 2023-11-21 PROCEDURE — 25010000002 PROPOFOL 10 MG/ML EMULSION: Performed by: NURSE ANESTHETIST, CERTIFIED REGISTERED

## 2023-11-21 PROCEDURE — 25810000003 LACTATED RINGERS PER 1000 ML: Performed by: SPECIALIST

## 2023-11-21 PROCEDURE — 25010000002 POTASSIUM CHLORIDE PER 2 MEQ: Performed by: SPECIALIST

## 2023-11-21 PROCEDURE — G0378 HOSPITAL OBSERVATION PER HR: HCPCS

## 2023-11-21 PROCEDURE — 19307 MAST MOD RAD: CPT | Performed by: SPECIALIST

## 2023-11-21 PROCEDURE — 25010000002 ONDANSETRON PER 1 MG: Performed by: NURSE ANESTHETIST, CERTIFIED REGISTERED

## 2023-11-21 PROCEDURE — 88307 TISSUE EXAM BY PATHOLOGIST: CPT | Performed by: SPECIALIST

## 2023-11-21 PROCEDURE — 25010000002 FENTANYL CITRATE (PF) 250 MCG/5ML SOLUTION: Performed by: NURSE ANESTHETIST, CERTIFIED REGISTERED

## 2023-11-21 PROCEDURE — 0 TECHETIUM TC99M TILMANOCEPT: Performed by: SPECIALIST

## 2023-11-21 PROCEDURE — A9520 TC99 TILMANOCEPT DIAG 0.5MCI: HCPCS | Performed by: SPECIALIST

## 2023-11-21 PROCEDURE — 25010000002 VANCOMYCIN 1 G RECONSTITUTED SOLUTION 1 EACH VIAL: Performed by: SPECIALIST

## 2023-11-21 PROCEDURE — 25810000003 SODIUM CHLORIDE 0.9 % SOLUTION 250 ML FLEX CONT: Performed by: SPECIALIST

## 2023-11-21 PROCEDURE — 19303 MAST SIMPLE COMPLETE: CPT | Performed by: SPECIALIST

## 2023-11-21 PROCEDURE — 25010000002 FENTANYL CITRATE (PF) 100 MCG/2ML SOLUTION: Performed by: NURSE ANESTHETIST, CERTIFIED REGISTERED

## 2023-11-21 RX ORDER — ONDANSETRON 2 MG/ML
INJECTION INTRAMUSCULAR; INTRAVENOUS AS NEEDED
Status: DISCONTINUED | OUTPATIENT
Start: 2023-11-21 | End: 2023-11-21 | Stop reason: SURG

## 2023-11-21 RX ORDER — OXYCODONE AND ACETAMINOPHEN 10; 325 MG/1; MG/1
1 TABLET ORAL ONCE AS NEEDED
Status: COMPLETED | OUTPATIENT
Start: 2023-11-21 | End: 2023-11-21

## 2023-11-21 RX ORDER — SODIUM CHLORIDE, SODIUM LACTATE, POTASSIUM CHLORIDE, CALCIUM CHLORIDE 600; 310; 30; 20 MG/100ML; MG/100ML; MG/100ML; MG/100ML
1000 INJECTION, SOLUTION INTRAVENOUS CONTINUOUS
Status: DISCONTINUED | OUTPATIENT
Start: 2023-11-21 | End: 2023-11-21

## 2023-11-21 RX ORDER — IBUPROFEN 600 MG/1
600 TABLET ORAL ONCE AS NEEDED
Status: DISCONTINUED | OUTPATIENT
Start: 2023-11-21 | End: 2023-11-21 | Stop reason: HOSPADM

## 2023-11-21 RX ORDER — FAMOTIDINE 20 MG/1
20 TABLET, FILM COATED ORAL 2 TIMES DAILY
Status: DISCONTINUED | OUTPATIENT
Start: 2023-11-21 | End: 2023-11-22 | Stop reason: HOSPADM

## 2023-11-21 RX ORDER — LABETALOL HYDROCHLORIDE 5 MG/ML
5 INJECTION, SOLUTION INTRAVENOUS
Status: DISCONTINUED | OUTPATIENT
Start: 2023-11-21 | End: 2023-11-21 | Stop reason: HOSPADM

## 2023-11-21 RX ORDER — FENTANYL CITRATE 50 UG/ML
INJECTION, SOLUTION INTRAMUSCULAR; INTRAVENOUS AS NEEDED
Status: DISCONTINUED | OUTPATIENT
Start: 2023-11-21 | End: 2023-11-21 | Stop reason: SURG

## 2023-11-21 RX ORDER — ONDANSETRON 2 MG/ML
4 INJECTION INTRAMUSCULAR; INTRAVENOUS EVERY 6 HOURS PRN
Status: DISCONTINUED | OUTPATIENT
Start: 2023-11-21 | End: 2023-11-22 | Stop reason: HOSPADM

## 2023-11-21 RX ORDER — HYDROCODONE BITARTRATE AND ACETAMINOPHEN 7.5; 325 MG/1; MG/1
1 TABLET ORAL EVERY 4 HOURS PRN
Status: DISCONTINUED | OUTPATIENT
Start: 2023-11-21 | End: 2023-11-22 | Stop reason: HOSPADM

## 2023-11-21 RX ORDER — MIDAZOLAM HYDROCHLORIDE 1 MG/ML
0.5 INJECTION INTRAMUSCULAR; INTRAVENOUS
Status: DISCONTINUED | OUTPATIENT
Start: 2023-11-21 | End: 2023-11-21 | Stop reason: HOSPADM

## 2023-11-21 RX ORDER — SODIUM CHLORIDE AND POTASSIUM CHLORIDE 150; 450 MG/100ML; MG/100ML
75 INJECTION, SOLUTION INTRAVENOUS CONTINUOUS
Status: DISCONTINUED | OUTPATIENT
Start: 2023-11-21 | End: 2023-11-22 | Stop reason: HOSPADM

## 2023-11-21 RX ORDER — SODIUM CHLORIDE 0.9 % (FLUSH) 0.9 %
10 SYRINGE (ML) INJECTION EVERY 12 HOURS SCHEDULED
Status: DISCONTINUED | OUTPATIENT
Start: 2023-11-21 | End: 2023-11-21 | Stop reason: HOSPADM

## 2023-11-21 RX ORDER — MORPHINE SULFATE 2 MG/ML
4 INJECTION, SOLUTION INTRAMUSCULAR; INTRAVENOUS
Status: DISCONTINUED | OUTPATIENT
Start: 2023-11-21 | End: 2023-11-22 | Stop reason: HOSPADM

## 2023-11-21 RX ORDER — NEOSTIGMINE METHYLSULFATE 5 MG/5 ML
SYRINGE (ML) INTRAVENOUS AS NEEDED
Status: DISCONTINUED | OUTPATIENT
Start: 2023-11-21 | End: 2023-11-21 | Stop reason: SURG

## 2023-11-21 RX ORDER — SUCRALFATE 1 G/1
1 TABLET ORAL
Status: DISCONTINUED | OUTPATIENT
Start: 2023-11-21 | End: 2023-11-22 | Stop reason: HOSPADM

## 2023-11-21 RX ORDER — SIMETHICONE 80 MG
80 TABLET,CHEWABLE ORAL 4 TIMES DAILY PRN
Status: DISCONTINUED | OUTPATIENT
Start: 2023-11-21 | End: 2023-11-22 | Stop reason: HOSPADM

## 2023-11-21 RX ORDER — KETOROLAC TROMETHAMINE 30 MG/ML
15 INJECTION, SOLUTION INTRAMUSCULAR; INTRAVENOUS EVERY 6 HOURS PRN
Status: DISCONTINUED | OUTPATIENT
Start: 2023-11-21 | End: 2023-11-22 | Stop reason: HOSPADM

## 2023-11-21 RX ORDER — BUPIVACAINE HCL/0.9 % NACL/PF 0.125 %
PLASTIC BAG, INJECTION (ML) EPIDURAL AS NEEDED
Status: DISCONTINUED | OUTPATIENT
Start: 2023-11-21 | End: 2023-11-21 | Stop reason: SURG

## 2023-11-21 RX ORDER — ENOXAPARIN SODIUM 100 MG/ML
30 INJECTION SUBCUTANEOUS EVERY 12 HOURS
Status: DISCONTINUED | OUTPATIENT
Start: 2023-11-22 | End: 2023-11-22 | Stop reason: HOSPADM

## 2023-11-21 RX ORDER — SODIUM CHLORIDE 0.9 % (FLUSH) 0.9 %
10 SYRINGE (ML) INJECTION AS NEEDED
Status: DISCONTINUED | OUTPATIENT
Start: 2023-11-21 | End: 2023-11-21 | Stop reason: HOSPADM

## 2023-11-21 RX ORDER — SODIUM CHLORIDE, SODIUM LACTATE, POTASSIUM CHLORIDE, CALCIUM CHLORIDE 600; 310; 30; 20 MG/100ML; MG/100ML; MG/100ML; MG/100ML
9 INJECTION, SOLUTION INTRAVENOUS CONTINUOUS
Status: DISCONTINUED | OUTPATIENT
Start: 2023-11-21 | End: 2023-11-21

## 2023-11-21 RX ORDER — ONDANSETRON 2 MG/ML
4 INJECTION INTRAMUSCULAR; INTRAVENOUS
Status: DISCONTINUED | OUTPATIENT
Start: 2023-11-21 | End: 2023-11-21 | Stop reason: HOSPADM

## 2023-11-21 RX ORDER — LORAZEPAM 1 MG/1
1 TABLET ORAL EVERY 6 HOURS PRN
Status: DISCONTINUED | OUTPATIENT
Start: 2023-11-21 | End: 2023-11-22 | Stop reason: HOSPADM

## 2023-11-21 RX ORDER — SODIUM CHLORIDE 9 MG/ML
100 INJECTION, SOLUTION INTRAVENOUS CONTINUOUS
Status: DISCONTINUED | OUTPATIENT
Start: 2023-11-21 | End: 2023-11-21

## 2023-11-21 RX ORDER — LIDOCAINE HYDROCHLORIDE 10 MG/ML
0.5 INJECTION, SOLUTION EPIDURAL; INFILTRATION; INTRACAUDAL; PERINEURAL ONCE AS NEEDED
Status: DISCONTINUED | OUTPATIENT
Start: 2023-11-21 | End: 2023-11-21 | Stop reason: HOSPADM

## 2023-11-21 RX ORDER — LIDOCAINE HYDROCHLORIDE 20 MG/ML
INJECTION, SOLUTION EPIDURAL; INFILTRATION; INTRACAUDAL; PERINEURAL AS NEEDED
Status: DISCONTINUED | OUTPATIENT
Start: 2023-11-21 | End: 2023-11-21 | Stop reason: SURG

## 2023-11-21 RX ORDER — PROPOFOL 10 MG/ML
VIAL (ML) INTRAVENOUS AS NEEDED
Status: DISCONTINUED | OUTPATIENT
Start: 2023-11-21 | End: 2023-11-21 | Stop reason: SURG

## 2023-11-21 RX ORDER — SCOLOPAMINE TRANSDERMAL SYSTEM 1 MG/1
1 PATCH, EXTENDED RELEASE TRANSDERMAL ONCE
Status: DISCONTINUED | OUTPATIENT
Start: 2023-11-21 | End: 2023-11-21

## 2023-11-21 RX ORDER — MAGNESIUM HYDROXIDE 1200 MG/15ML
LIQUID ORAL AS NEEDED
Status: DISCONTINUED | OUTPATIENT
Start: 2023-11-21 | End: 2023-11-21 | Stop reason: HOSPADM

## 2023-11-21 RX ORDER — SODIUM CHLORIDE 0.9 % (FLUSH) 0.9 %
3 SYRINGE (ML) INJECTION AS NEEDED
Status: DISCONTINUED | OUTPATIENT
Start: 2023-11-21 | End: 2023-11-21 | Stop reason: HOSPADM

## 2023-11-21 RX ORDER — DROPERIDOL 2.5 MG/ML
0.62 INJECTION, SOLUTION INTRAMUSCULAR; INTRAVENOUS ONCE AS NEEDED
Status: DISCONTINUED | OUTPATIENT
Start: 2023-11-21 | End: 2023-11-21 | Stop reason: HOSPADM

## 2023-11-21 RX ORDER — NALOXONE HCL 0.4 MG/ML
0.04 VIAL (ML) INJECTION AS NEEDED
Status: DISCONTINUED | OUTPATIENT
Start: 2023-11-21 | End: 2023-11-21 | Stop reason: HOSPADM

## 2023-11-21 RX ORDER — FLUMAZENIL 0.1 MG/ML
0.2 INJECTION INTRAVENOUS AS NEEDED
Status: DISCONTINUED | OUTPATIENT
Start: 2023-11-21 | End: 2023-11-21 | Stop reason: HOSPADM

## 2023-11-21 RX ORDER — FENTANYL CITRATE 50 UG/ML
25 INJECTION, SOLUTION INTRAMUSCULAR; INTRAVENOUS
Status: DISCONTINUED | OUTPATIENT
Start: 2023-11-21 | End: 2023-11-21 | Stop reason: HOSPADM

## 2023-11-21 RX ORDER — DEXTROSE MONOHYDRATE 25 G/50ML
12.5 INJECTION, SOLUTION INTRAVENOUS AS NEEDED
Status: DISCONTINUED | OUTPATIENT
Start: 2023-11-21 | End: 2023-11-21 | Stop reason: HOSPADM

## 2023-11-21 RX ORDER — ROCURONIUM BROMIDE 10 MG/ML
INJECTION, SOLUTION INTRAVENOUS AS NEEDED
Status: DISCONTINUED | OUTPATIENT
Start: 2023-11-21 | End: 2023-11-21 | Stop reason: SURG

## 2023-11-21 RX ORDER — HYDROMORPHONE HYDROCHLORIDE 1 MG/ML
0.5 INJECTION, SOLUTION INTRAMUSCULAR; INTRAVENOUS; SUBCUTANEOUS
Status: DISCONTINUED | OUTPATIENT
Start: 2023-11-21 | End: 2023-11-21 | Stop reason: HOSPADM

## 2023-11-21 RX ADMIN — VANCOMYCIN HYDROCHLORIDE 1000 MG: 1 INJECTION, POWDER, LYOPHILIZED, FOR SOLUTION INTRAVENOUS at 19:52

## 2023-11-21 RX ADMIN — OXYCODONE HYDROCHLORIDE AND ACETAMINOPHEN 1 TABLET: 10; 325 TABLET ORAL at 13:22

## 2023-11-21 RX ADMIN — SODIUM CHLORIDE, POTASSIUM CHLORIDE, SODIUM LACTATE AND CALCIUM CHLORIDE: 600; 310; 30; 20 INJECTION, SOLUTION INTRAVENOUS at 11:54

## 2023-11-21 RX ADMIN — SCOPALAMINE 1 PATCH: 1 PATCH, EXTENDED RELEASE TRANSDERMAL at 08:26

## 2023-11-21 RX ADMIN — ONDANSETRON 4 MG: 2 INJECTION INTRAMUSCULAR; INTRAVENOUS at 12:27

## 2023-11-21 RX ADMIN — VANCOMYCIN HYDROCHLORIDE 1000 MG: 1 INJECTION, POWDER, LYOPHILIZED, FOR SOLUTION INTRAVENOUS at 08:26

## 2023-11-21 RX ADMIN — Medication 3 MG: at 12:45

## 2023-11-21 RX ADMIN — FENTANYL CITRATE 100 MCG: 0.05 INJECTION, SOLUTION INTRAMUSCULAR; INTRAVENOUS at 09:36

## 2023-11-21 RX ADMIN — TILMANOCEPT 1 DOSE: KIT at 08:00

## 2023-11-21 RX ADMIN — ROCURONIUM BROMIDE 10 MG: 10 SOLUTION INTRAVENOUS at 11:09

## 2023-11-21 RX ADMIN — SODIUM CHLORIDE, POTASSIUM CHLORIDE, SODIUM LACTATE AND CALCIUM CHLORIDE 1000 ML: 600; 310; 30; 20 INJECTION, SOLUTION INTRAVENOUS at 06:59

## 2023-11-21 RX ADMIN — HYDROCODONE BITARTRATE AND ACETAMINOPHEN 1 TABLET: 7.5; 325 TABLET ORAL at 15:44

## 2023-11-21 RX ADMIN — FENTANYL CITRATE 50 MCG: 0.05 INJECTION, SOLUTION INTRAMUSCULAR; INTRAVENOUS at 10:03

## 2023-11-21 RX ADMIN — POTASSIUM CHLORIDE AND SODIUM CHLORIDE 75 ML/HR: 450; 150 INJECTION, SOLUTION INTRAVENOUS at 15:45

## 2023-11-21 RX ADMIN — PROPOFOL INJECTABLE EMULSION 200 MG: 10 INJECTION, EMULSION INTRAVENOUS at 09:03

## 2023-11-21 RX ADMIN — FENTANYL CITRATE 100 MCG: 0.05 INJECTION, SOLUTION INTRAMUSCULAR; INTRAVENOUS at 09:03

## 2023-11-21 RX ADMIN — ROCURONIUM BROMIDE 30 MG: 10 SOLUTION INTRAVENOUS at 09:32

## 2023-11-21 RX ADMIN — GLYCOPYRROLATE 0.4 MG: 0.2 INJECTION INTRAMUSCULAR; INTRAVENOUS at 12:45

## 2023-11-21 RX ADMIN — Medication 100 MCG: at 10:41

## 2023-11-21 RX ADMIN — ROCURONIUM BROMIDE 20 MG: 10 SOLUTION INTRAVENOUS at 10:15

## 2023-11-21 RX ADMIN — LIDOCAINE HYDROCHLORIDE 100 MG: 20 INJECTION, SOLUTION EPIDURAL; INFILTRATION; INTRACAUDAL; PERINEURAL at 09:03

## 2023-11-21 RX ADMIN — ROCURONIUM BROMIDE 20 MG: 10 SOLUTION INTRAVENOUS at 09:03

## 2023-11-21 RX ADMIN — FENTANYL CITRATE 100 MCG: 50 INJECTION INTRAMUSCULAR; INTRAVENOUS at 11:09

## 2023-11-21 RX ADMIN — Medication 100 MCG: at 10:46

## 2023-11-21 NOTE — ANESTHESIA POSTPROCEDURE EVALUATION
Patient: Adriana Samuels    Procedure Summary       Date: 11/21/23 Room / Location: [facility] OR  /  [facility] OR    Anesthesia Start: 0858 Anesthesia Stop: 1303    Procedure: BREAST MASTECTOMY WITH left SENTINEL NODE BIOPSY AND AXILLARY NODE DISSECTION BILATERAL (Bilateral: Breast) Diagnosis:       Malignant neoplasm of upper-outer quadrant of right female breast, unspecified estrogen receptor status      S/P lumpectomy, right breast      S/P lymph node biopsy      History of left breast cancer      (Malignant neoplasm of upper-outer quadrant of right female breast, unspecified estrogen receptor status [C50.411])      (S/P lumpectomy, right breast [Z98.890])      (S/P lymph node biopsy [Z98.890])      (History of left breast cancer [Z85.3])    Surgeons: Duglas Lawrence MD Provider: Iam Serna CRNA    Anesthesia Type: general ASA Status: 2            Anesthesia Type: general    Vitals  Vitals Value Taken Time   /49 11/21/23 1401   Temp 98.7 °F (37.1 °C) 11/21/23 1401   Pulse 84 11/21/23 1401   Resp 16 11/21/23 1401   SpO2 97 % 11/21/23 1401           Post Anesthesia Care and Evaluation    Patient location during evaluation: PACU  Patient participation: complete - patient participated  Level of consciousness: awake and awake and alert  Pain score: 0  Pain management: adequate    Airway patency: patent  Anesthetic complications: No anesthetic complications  PONV Status: none  Cardiovascular status: acceptable  Respiratory status: acceptable  Hydration status: acceptable    Comments: Patient discharged according to acceptable Gold score per RN assessment. See nursing records for further information.     Blood pressure 158/55, pulse 74, temperature 99.2 °F (37.3 °C), temperature source Temporal, resp. rate 16, SpO2 95%, not currently breastfeeding.

## 2023-11-21 NOTE — ANESTHESIA PROCEDURE NOTES
Airway  Urgency: elective    Date/Time: 11/21/2023 9:04 AM  Airway not difficult    General Information and Staff    Patient location during procedure: OR  CRNA/CAA: Iam Serna CRNA    Indications and Patient Condition  Indications for airway management: airway protection    Preoxygenated: yes  Mask difficulty assessment: 1 - vent by mask    Final Airway Details  Final airway type: endotracheal airway      Successful airway: ETT  Cuffed: yes   Successful intubation technique: direct laryngoscopy  Facilitating devices/methods: intubating stylet  Endotracheal tube insertion site: oral  Blade: Christ  Blade size: 3.5  ETT size (mm): 7.5  Cormack-Lehane Classification: grade I - full view of glottis  Placement verified by: chest auscultation and capnometry   Cuff volume (mL): 5  Measured from: lips  ETT/EBT  to lips (cm): 21  Number of attempts at approach: 1  Assessment: lips, teeth, and gum same as pre-op and atraumatic intubation

## 2023-11-21 NOTE — OP NOTE
BREAST MASTECTOMY WITH SENTINEL NODE BIOPSY AND AXILLARY NODE DISSECTION  Procedure Note    Adriana Mohan Arvind  11/21/2023    Pre-op Diagnosis:   Malignant neoplasm of upper-outer quadrant of right female breast, unspecified estrogen receptor status [C50.411]  S/P lumpectomy, right breast [Z98.890]  S/P lymph node biopsy [Z98.890]  History of left breast cancer [Z85.3]    Post-op Diagnosis:     Post-Op Diagnosis Codes:     * Malignant neoplasm of upper-outer quadrant of right female breast, unspecified estrogen receptor status [C50.411]     * S/P lumpectomy, right breast [Z98.890]     * S/P lymph node biopsy [Z98.890]     * History of left breast cancer [Z85.3]    Procedure/CPT® Codes:      Procedure(s):  Bilateral mastectomy with sentinel lymph node resection from the left axilla.    Surgeon(s):  Duglas Lawrence MD  Assistant: Glynn Emerson      Anesthesia: General    Staff:   Specimens       ID Source Type Tests Collected By Collected At Frozen?    A Breast, Right Tissue TISSUE PATHOLOGY EXAM   Duglas Lawrence MD 11/21/23 1020 No    Description: right breast, suture in axilla    B Breast, Left Tissue TISSUE PATHOLOGY EXAM   Duglas Lawrence MD 11/21/23 1152 No    Description: left breast, sentinal node marked with suture            Estimated Blood Loss: 50 cc    Specimens:                ID Type Source Tests Collected by Time   A : right breast, suture in axilla Tissue Breast, Right TISSUE PATHOLOGY EXAM Duglas Lawrence MD 11/21/2023 1020   B : left breast, sentinal node marked with suture Tissue Breast, Left TISSUE PATHOLOGY EXAM Duglas Lawrence MD 11/21/2023 1152         Drains:   Closed/Suction Drain 1 Inferior;Lateral;Right Chest Bulb 10 Fr. (Active)       Closed/Suction Drain 2 Inferior;Right Chest Bulb 10 Fr. (Active)       Closed/Suction Drain 3 Inferior;Left Chest Bulb 10 Fr. (Active)       Closed/Suction Drain 4 Inferior;Lateral;Left Chest Bulb 10 Fr. (Active)       [REMOVED] Closed/Suction  Drain 1 Right;Lateral Breast Bulb 10 Fr. (Removed)       Indications: Adriana Samuels is a 68 y.o. female currently 5 years out from a right upper outer breast lumpectomy and sentinel lymph node evaluation in October 2018.  She is 5 years out from surgery she has had no problems she has some changes from radiation with hypervascularization in the inferior aspect of the breast and anterior chest wall as well as dimpling in the central aspect of the lumpectomy site but otherwise she is doing well she had a very minimal tumor with 1 sentinel lymph node that was positive.  She received chemotherapy and radiation is done well.  She he had a T1N1 tumor.     With this noted Ms. Samuels had a mammogram accomplished the right side her previous operative site was clear but the left had a lesion at the 2 o'clock position.  To areas were evaluated and biopsied and both showed adenocarcinoma as well as some background intraductal carcinoma in situ.  The size of the area largest span was 14 mm as best as we can tell and we have discussed this she has a tumor on the left side, the contralateral side from her operative side and she is advised of surgical options.  We can look toward completing a lumpectomy and sentinel lymph node evaluation on this left side with postoperative radiation she has been through this on the right the other option is to proceed with bilateral mastectomy and treatment of the irradiated side and previous lumpectomy site on the right as well as this most recently diagnosed cancer side on the left.  Also we would complete sentinel lymph node evaluation on the left.  She is aware of the finding the risk and benefits and after full discussion of this and apparent understanding she gives her informed consent for bilateral mastectomy and sentinel lymph node evaluation on the left risk and benefits discussed with full knowledge of this and apparent understanding she gives her informed consent for  surgery.    Findings: Right simple mastectomy completed in a irradiated field, closure over 210 mm flat Varun-Moya drains.  Left mastectomy completed and sentinel lymph node evaluation with no further elevated counts following resection full visualization of the long thoracic and thoracodorsal nerves and avoided.  Blood loss less than 50 cc.    Complications: No complications    Procedure: Adriana Samuels was placed in a supine position in the surgical suite and after adequate prepping and draping had been completed, 1 g of vancomycin given IV piggyback, a timeout completed the area was scrubbed prepped and draped in the usual fashion.  Lines of excision were marked in the right and left chest to be mirror images of 1 another the right had previous radiation changes and a previous upper outer curvilinear incision and I was trying to excise the scar from the right side and subsequently match it on the left side.  The patient had mirror-image tumors 5 years .  With the area marked Ioban was placed around the periphery to keep the drapes in place a timeout was completed antibiotics were assured and surgery was begun.  The excision site was 19 cm in length and 7 cm wide.  The right side was begun first as there was no residual cancer noted on the right side.  A incision was completed with the ellipse noted more in a teardrop shape fashion the skin and dermis was incised single-toothed tenaculums were placed a total of 4 with upper retraction flaps were created superiorly to the clavicle medially to the sternum laterally to the latissimus and inferior to the anterior abdominal wall.  With the flaps created with the harmonic scalpel dissection was then begun to remove the breast from the chest wall and the breast was removed from the pectoralis major with several areas at the medial aspect of the breast grasped and ligated with 2-0 silk suture from the perforating intercostals.  The perforating intercostals  controlled the breast was able to be retracted back laterally also taking the pectoralis major fascia and with retraction of the breast and the pectoralis major fascia he was also removed from the pectoralis minor and removal of this breast tissue was accomplished.  With removal of breast tissue once again dissection superiorly was completed to the clavicle medial to the sternum inferior to the anterior abdominal wall and laterally to the latissimus.  With this breast removed it was then sent for pathologic evaluation marking the axilla portions and sending it for evaluation.  The wound was then irrigated with sterile water and left in communication for 5 minutes and with the sterile water removed 2  10 mm flat Varun-Moya drains placed some trimming of some of the excess skin was relieved and the drains were affixed to the skin using 3-0 nylon suture.  With this completed the deep dermis was reapproximated using simple inverted interrupted sutures of 3-0 Vicryl and the skin was enclosed using running subcuticular suture of 4-0 Vicryl.  With this completed I then placed a Tegaderm over the area and then we planned on proceeding with treatment of the left breast.  Changed my gloves and we moved to the opposite side and the left mastectomy began. An incision was completed through the previously marked breast on the left, tenaculums were placed and right easing of the flaps both superior and inferiorly would be gone.  Single-toothed tenaculums were placed a total of 4 with upper retraction flaps were created superiorly to the clavicle medially to the sternum laterally to the latissimus and inferior to the anterior abdominal wall.  With the flaps created with the harmonic scalpel dissection was then begun to remove the breast from the chest wall and the breast was removed from the pectoralis major with several areas at the medial aspect of the breast grasped and ligated with 2-0 silk suture from the perforating  intercostals.  The perforating intercostals controlled the breast was able to be retracted back laterally also taking the pectoralis major fascia and with retraction of the breast and the pectoralis major fascia he was also removed from the pectoralis minor and removal of this breast tissue was accomplished.  With removal of breast tissue once again dissection superiorly was completed to the clavicle medial to the sternum inferior to the anterior abdominal wall and laterally to the latissimus.  At this point the navigator probe was brought into the field and the sentinel nodes were noted and the sentinel nodes were dissected free and included into the specimen.  With the sentinel nodes removed there is some lower counts noted but the 10,000 count established before removal the background count of only 200 remained.  The sentinel node was marked and sent for inclusion in the specimen and it was in conjunction with the specimen.  With this breast removed it was then sent for pathologic evaluation marking the axilla portions and sending it for evaluation.  The wound was then irrigated with sterile water and left in communication for 5 minutes and with the sterile water removed 210 mm flat Varun-Moya drains placed some trimming of some of the excess skin was relieved and the drains were affixed to the skin using 3-0 nylon suture.  With this completed the deep dermis was reapproximated using simple inverted interrupted sutures of 3-0 Vicryl and the skin was enclosed using running subcuticular suture of 4-0 Vicryl.  Following this Mastisol, Steri-Strips, 4 x 4 and Tegaderms were applied over the wounds and all around the drain sites she was then extubated and transported to the recovery room in good condition.  [place] Node Biopsy for Breast Cancer - Left  Operation performed with curative intent. Yes   Tracer(s) used to identify sentinel nodes in the upfront surgery (non-neoadjuvant) setting (select all that apply).  Radioactive tracer   Tracer(s) used to identify sentinel nodes in the neoadjuvant setting (select all that apply). Radioactive tracer   All nodes (colored or non-colored) present at the end of a dye-filled lymphatic channel were removed. Yes   All significantly radioactive nodes were removed. Yes   All palpably suspicious nodes were removed. Yes   Biopsy-proven positive nodes marked with clips prior to chemotherapy were identified and removed. N/A              Duglas Lawrence MD     Date: 11/21/2023  Time: 13:05 CST    Part of this note may be an electronic transcription/translation of spoken language to printed text using the Dragon Dictation System.

## 2023-11-21 NOTE — ANESTHESIA PREPROCEDURE EVALUATION
Anesthesia Evaluation     Patient summary reviewed   history of anesthetic complications:  PONV  NPO Solid Status: > 8 hours             Airway   Mallampati: II  Dental      Pulmonary    (-) COPD, asthma, sleep apnea, not a smoker  Cardiovascular   Exercise tolerance: excellent (>7 METS)    (-) pacemaker, past MI, angina, cardiac stents      Neuro/Psych  (-) seizures, TIA, CVA  GI/Hepatic/Renal/Endo    (-) GERD, liver disease, no renal disease, diabetes    Musculoskeletal     Abdominal    Substance History      OB/GYN          Other      history of cancer                Anesthesia Plan    ASA 2     general     intravenous induction     Anesthetic plan, risks, benefits, and alternatives have been provided, discussed and informed consent has been obtained with: patient.    CODE STATUS:

## 2023-11-22 VITALS
HEART RATE: 70 BPM | DIASTOLIC BLOOD PRESSURE: 52 MMHG | OXYGEN SATURATION: 99 % | SYSTOLIC BLOOD PRESSURE: 137 MMHG | RESPIRATION RATE: 16 BRPM | TEMPERATURE: 98.6 F

## 2023-11-22 PROBLEM — C50.411: Status: ACTIVE | Noted: 2023-11-22

## 2023-11-22 PROBLEM — C50.412: Status: ACTIVE | Noted: 2023-11-22

## 2023-11-22 LAB
CYTO UR: NORMAL
LAB AP CASE REPORT: NORMAL
LAB AP SYNOPTIC CHECKLIST: NORMAL
Lab: NORMAL
PATH REPORT.FINAL DX SPEC: NORMAL
PATH REPORT.GROSS SPEC: NORMAL

## 2023-11-22 PROCEDURE — G0378 HOSPITAL OBSERVATION PER HR: HCPCS

## 2023-11-22 PROCEDURE — 25010000002 VANCOMYCIN 1 G RECONSTITUTED SOLUTION 1 EACH VIAL: Performed by: SPECIALIST

## 2023-11-22 PROCEDURE — 99238 HOSP IP/OBS DSCHRG MGMT 30/<: CPT | Performed by: SPECIALIST

## 2023-11-22 PROCEDURE — 25810000003 SODIUM CHLORIDE 0.9 % SOLUTION 250 ML FLEX CONT: Performed by: SPECIALIST

## 2023-11-22 PROCEDURE — 97162 PT EVAL MOD COMPLEX 30 MIN: CPT | Performed by: PHYSICAL THERAPIST

## 2023-11-22 PROCEDURE — 97535 SELF CARE MNGMENT TRAINING: CPT | Performed by: PHYSICAL THERAPIST

## 2023-11-22 PROCEDURE — 25010000002 POTASSIUM CHLORIDE PER 2 MEQ: Performed by: SPECIALIST

## 2023-11-22 PROCEDURE — 25010000002 KETOROLAC TROMETHAMINE PER 15 MG: Performed by: SPECIALIST

## 2023-11-22 RX ORDER — ONDANSETRON HYDROCHLORIDE 8 MG/1
4 TABLET, FILM COATED ORAL EVERY 8 HOURS PRN
Qty: 5 TABLET | Refills: 1 | Status: SHIPPED | OUTPATIENT
Start: 2023-11-22

## 2023-11-22 RX ORDER — HYDROCODONE BITARTRATE AND ACETAMINOPHEN 5; 325 MG/1; MG/1
1 TABLET ORAL EVERY 4 HOURS PRN
Qty: 20 TABLET | Refills: 0 | Status: SHIPPED | OUTPATIENT
Start: 2023-11-22 | End: 2023-11-29

## 2023-11-22 RX ORDER — KETOROLAC TROMETHAMINE 10 MG/1
10 TABLET, FILM COATED ORAL EVERY 6 HOURS PRN
Qty: 20 TABLET | Refills: 1 | Status: SHIPPED | OUTPATIENT
Start: 2023-11-22 | End: 2023-12-01

## 2023-11-22 RX ORDER — CLINDAMYCIN HYDROCHLORIDE 300 MG/1
300 CAPSULE ORAL DAILY
Qty: 21 CAPSULE | Refills: 1 | Status: SHIPPED | OUTPATIENT
Start: 2023-11-22 | End: 2023-12-01

## 2023-11-22 RX ADMIN — POTASSIUM CHLORIDE AND SODIUM CHLORIDE 75 ML/HR: 450; 150 INJECTION, SOLUTION INTRAVENOUS at 08:36

## 2023-11-22 RX ADMIN — KETOROLAC TROMETHAMINE 15 MG: 30 INJECTION, SOLUTION INTRAMUSCULAR; INTRAVENOUS at 00:18

## 2023-11-22 RX ADMIN — VANCOMYCIN HYDROCHLORIDE 1000 MG: 1 INJECTION, POWDER, LYOPHILIZED, FOR SOLUTION INTRAVENOUS at 08:36

## 2023-11-22 RX ADMIN — HYDROCODONE BITARTRATE AND ACETAMINOPHEN 1 TABLET: 7.5; 325 TABLET ORAL at 00:18

## 2023-11-22 NOTE — THERAPY EVALUATION
Patient Name: Adriana Samuels  : 1955    MRN: [id number]                              Today's Date: 2023       Admit Date: 2023    Visit Dx:     ICD-10-CM ICD-9-CM   1. Malignant neoplasm of upper-outer quadrant of right female breast, unspecified estrogen receptor status  C50.411 174.4   2. S/P lumpectomy, right breast  Z98.890 V45.89   3. S/P lymph node biopsy  Z98.890 V45.89   4. History of left breast cancer  Z85.3 V10.3     Patient Active Problem List   Diagnosis    Malignant neoplasm of upper-outer quadrant of right female breast    Non-smoker    S/P lumpectomy, right breast    S/P lymph node biopsy    History of radiation therapy    Prophylactic use of letrozole (Femara)    Screening mammogram for high-risk patient    History of left breast cancer    Bilateral malignant neoplasm of upper outer quadrant of breast in female, unspecified estrogen receptor status     Past Medical History:   Diagnosis Date    Breast cancer 2018    right breast    Cancer     Drug therapy 2018-2018    Herpes zoster     Hx of radiation therapy 2019    right breast    Menorrhagia     Mononucleosis     Osteopenia     PONV (postoperative nausea and vomiting)     Shingles      Past Surgical History:   Procedure Laterality Date    BREAST BIOPSY Right 2018    positive    BREAST LUMPECTOMY Right 2018    lumpectomy with chemo and rad tx    BREAST LUMPECTOMY WITH SENTINEL NODE BIOPSY Right 09/10/2018    Procedure: RIGHT BREAST LUMPECTOMY WITH SENTINEL LYMPH NODE BIOPSY, INJECTION AND SCAN;  Surgeon: Duglas Lawrence MD;  Location: [facility] OR;  Service: General    COLONOSCOPY N/A 2019    Procedure: COLONOSCOPY WITH ANESTHESIA;  Surgeon: Brock Raman MD;  Location: [facility] ENDOSCOPY;  Service: Gastroenterology    HYDROCELE EXCISION / REPAIR      LASER ABLATION OF THE CERVIX      MASTECTOMY WITH SENTINEL NODE BIOPSY AND AXILLARY NODE DISSECTION Bilateral 2023    Procedure: BREAST  MASTECTOMY WITH left SENTINEL NODE BIOPSY AND AXILLARY NODE DISSECTION BILATERAL;  Surgeon: Duglas Lawrence MD;  Location: [facility] OR;  Service: General;  Laterality: Bilateral;    WRIST FUSION Left       General Information       Row Name 11/22/23 0915          Physical Therapy Time and Intention    Document Type evaluation  -KR     Mode of Treatment individual therapy  -KR       Row Name 11/22/23 0915          General Information    Patient Profile Reviewed yes  -KR     Prior Level of Function independent:  -KR     Existing Precautions/Restrictions no known precautions/restrictions  -KR     Barriers to Rehab none identified  -KR       Row Name 11/22/23 0915          Living Environment    People in Home significant other  -KR       Row Name 11/22/23 0915          Home Main Entrance    Number of Stairs, Main Entrance three  -KR     Stair Railings, Main Entrance railings safe and in good condition  -KR       Row Name 11/22/23 0915          Stairs Within Home, Primary    Number of Stairs, Within Home, Primary none  -KR       Row Name 11/22/23 0915          Cognition    Orientation Status (Cognition) oriented x 4  -KR       Row Name 11/22/23 0915          Safety Issues, Functional Mobility    Comment, Safety Issues/Impairments (Mobility) none  -KR               User Key  (r) = Recorded By, (t) = Taken By, (c) = Cosigned By      Initials Name Provider Type    Nisa Torres, PT DPT Physical Therapist                   Mobility       Row Name 11/22/23 0915          Bed Mobility    Bed Mobility bed mobility (all) activities  -KR     All Activities, [place] (Bed Mobility) modified independence  -KR     Comment, (Bed Mobility) reports only needing assistance for IV/lines  -KR               User Key  (r) = Recorded By, (t) = Taken By, (c) = Cosigned By      Initials Name Provider Type    Nisa Torres, PT DPT Physical Therapist                   Obj/Interventions       Row Name 11/22/23 0915           Range of Motion Comprehensive    General Range of Motion upper extremity range of motion deficits identified  -KR     Comment, General Range of Motion shoulder flexion self limited by pain, limited by 50-75%  -KR       Row Name 11/22/23 0915          Strength Comprehensive (MMT)    General Manual Muscle Testing (MMT) Assessment upper extremity strength deficits identified  -KR     Comment, General Manual Muscle Testing (MMT) Assessment did not fomally test secondary to post  -KR       Row Name 11/22/23 0915          Sensory Assessment (Somatosensory)    Sensory Assessment (Somatosensory) sensation intact  -KR               User Key  (r) = Recorded By, (t) = Taken By, (c) = Cosigned By      Initials Name Provider Type    Nisa Torres, PT DPT Physical Therapist                   Goals/Plan    No documentation.                  Clinical Impression       Row Name 11/22/23 0915          Pain    Pretreatment Pain Rating 0/10 - no pain  with sitting still. slight pain in L wrist IV site with movement  -KR     Posttreatment Pain Rating 0/10 - no pain  -KR     Pain Intervention(s) Medication (See MAR);Repositioned  -KR       Row Name 11/22/23 0915          Plan of Care Review    Plan of Care Reviewed With patient;caregiver  -KR     Progress improving  -KR     Outcome Evaluation Physical therapy evaluation performed. Pt moving in room with modified independence per pt and family. Educated provided for lymphedema and post op exercises. Pt reports independence with MAICOL drains and had one during past surgery. No indication for skilled PT needed at this time. She has our lymphedema therapist's contact information if questions/concerns arise.  -KR       Row Name 11/22/23 0915          Therapy Assessment/Plan (PT)    Patient/Family Therapy Goals Statement (PT) Go home  -KR     Criteria for Skilled Interventions Met (PT) no problems identified which require skilled intervention;does not meet criteria for skilled  intervention  -KR       Row Name 11/22/23 0915          Vital Signs    O2 Delivery Pre Treatment room air  -KR       Row Name 11/22/23 0915          Positioning and Restraints    Pre-Treatment Position in bed  -KR     Post Treatment Position bed  -KR     In Bed notified nsg;call light within reach;with family/caregiver;SCD pump applied  -KR               User Key  (r) = Recorded By, (t) = Taken By, (c) = Cosigned By      Initials Name Provider Type    Nisa Torres, PT DPT Physical Therapist                   Outcome Measures       Row Name 11/22/23 0915 11/22/23 0418       How much help from another person do you currently need...    Turning from your back to your side while in flat bed without using bedrails? 3  -KR 3  -SO    Moving from lying on back to sitting on the side of a flat bed without bedrails? 3  -KR 3  -SO    Moving to and from a bed to a chair (including a wheelchair)? 3  -KR 4  -SO    Standing up from a chair using your arms (e.g., wheelchair, bedside chair)? 3  -KR 4  -SO    Climbing 3-5 steps with a railing? 3  -KR 4  -SO    To walk in hospital room? 3  -KR 4  -SO    AM-PAC 6 Clicks Score (PT) 18  -KR 22  -SO    Highest Level of Mobility Goal 6 --> Walk 10 steps or more  -KR 7 --> Walk 25 feet or more  -SO              User Key  (r) = Recorded By, (t) = Taken By, (c) = Cosigned By      Initials Name Provider Type    Nisa Torres, PT DPT Physical Therapist    Shamika Parks RN Registered Nurse                                 Physical Therapy Education       Title: PT OT SLP Therapies (Resolved)       Topic: Physical Therapy (Resolved)       Point: Mobility training (Resolved)       Learning Progress Summary             Patient Acceptance, E,H, VU by NNAMDI at 11/22/2023 1009    Comment: Education provided on lymphemda, post op precautions   Family Acceptance, E,H, VU by NNAMDI at 11/22/2023 1009    Comment: Education provided on lymphemda, post op precautions                          Point: Home exercise program (Resolved)       Learning Progress Summary             Patient Acceptance, E,H, VU by KR at 11/22/2023 1009    Comment: Education provided on lymphemda, post op precautions   Family Acceptance, E,H, VU by KR at 11/22/2023 1009    Comment: Education provided on lymphemda, post op precautions                         Point: Body mechanics (Resolved)       Learning Progress Summary             Patient Acceptance, E,H, VU by KR at 11/22/2023 1009    Comment: Education provided on lymphemda, post op precautions   Family Acceptance, E,H, VU by KR at 11/22/2023 1009    Comment: Education provided on lymphemda, post op precautions                         Point: Precautions (Resolved)       Learning Progress Summary             Patient Acceptance, E,H, VU by KR at 11/22/2023 1009    Comment: Education provided on lymphemda, post op precautions   Family Acceptance, E,H, VU by KR at 11/22/2023 1009    Comment: Education provided on lymphemda, post op precautions                                         User Key       Initials Effective Dates Name Provider Type Discipline     07/11/23 -  Nisa Vega, PT DPT Physical Therapist PT                  PT Recommendation and Plan     Plan of Care Reviewed With: patient, caregiver  Progress: improving  Outcome Evaluation: Physical therapy evaluation performed. Pt moving in room with modified independence per pt and family. Educated provided for lymphedema and post op exercises. Pt reports independence with MAICOL drains and had one during past surgery. No indication for skilled PT needed at this time. She has our lymphedema therapist's contact information if questions/concerns arise.     Time Calculation:         PT Charges       Row Name 11/22/23 1012             Time Calculation    Start Time 0915  -KR      Stop Time 1000  -KR      Time Calculation (min) 45 min  -KR         Time Calculation- PT    Total Timed Code Minutes- PT 15 minute(s)  -KR                 User Key  (r) = Recorded By, (t) = Taken By, (c) = Cosigned By      Initials Name Provider Type    Nisa Torres, PT DPT Physical Therapist                  Therapy Charges for Today       Code Description Service Date Service Provider Modifiers Qty    84846933372 HC PT EVAL MOD COMPLEXITY 2 11/22/2023 Nisa Vega, PT DPT GP 1    75715528799 HC PT SELF CARE/MGMT/TRAIN EA 15 MIN 11/22/2023 Nisa Vega, PT DPT GP 1            PT G-Codes  AM-PAC 6 Clicks Score (PT): 18  PT Discharge Summary  Anticipated Discharge Disposition (PT): home with assist  Reason for Discharge: Independent, Discharge from facility  Discharge Destination: Home with assist    Nisa Vega PT DPT  11/22/2023

## 2023-11-22 NOTE — PLAN OF CARE
Goal Outcome Evaluation:  Plan of Care Reviewed With: patient, caregiver        Progress: improving  Outcome Evaluation: Physical therapy evaluation performed. Pt moving in room with modified independence per pt and family. Educated provided for lymphedema and post op exercises. Pt reports independence with MAICOL drains and had one during past surgery. No indication for skilled PT needed at this time. She has our lymphedema therapist's contact information if questions/concerns arise.      Anticipated Discharge Disposition (PT): home with assist

## 2023-11-22 NOTE — PLAN OF CARE
Goal Outcome Evaluation:      VSS. IV fluids going. Norco x1 for pain management. MAICOL drains x4. Pt DTV.

## 2023-11-22 NOTE — PLAN OF CARE
Goal Outcome Evaluation:  Plan of Care Reviewed With: patient        Progress: improving   VSS, voiding, ambulating, tolerating PO, pain within tolerable limits with pain medication per patient, MAICOL drains x4 draining, resting between care.

## 2023-11-22 NOTE — DISCHARGE SUMMARY
Date of Discharge:  11/22/2023    Discharge Diagnosis: Left breast carcinoma    Presenting Problem/History of Present Illness    Adriana Samuels is a 68 y.o. female currently 5 years out from a right upper outer breast lumpectomy and sentinel lymph node evaluation in October 2018.  She is 5 years out from surgery she has had no problems she has some changes from radiation with hypervascularization in the inferior aspect of the breast and anterior chest wall as well as dimpling in the central aspect of the lumpectomy site but otherwise she is doing well she had a very minimal tumor with 1 sentinel lymph node that was positive.  She received chemotherapy and radiation is done well.  She he had a T1N1 tumor.     With this noted Ms. Samuels had a mammogram accomplished the right side her previous operative site was clear but the left had a lesion at the 2 o'clock position.  To areas were evaluated and biopsied and both showed adenocarcinoma as well as some background intraductal carcinoma in situ.  The size of the area largest span was 14 mm as best as we can tell and we have discussed this she has a tumor on the left side, the contralateral side from her operative side and she is advised of surgical options.  We can look toward completing a lumpectomy and sentinel lymph node evaluation on this left side with postoperative radiation she has been through this on the right the other option is to proceed with bilateral mastectomy and treatment of the irradiated side and previous lumpectomy site on the right as well as this most recently diagnosed cancer side on the left.  Also we would complete sentinel lymph node evaluation on the left.  She is aware of the finding the risk and benefits and after full discussion of this and apparent understanding she gives her informed consent for bilateral mastectomy and sentinel lymph node evaluation on the left risk and benefits discussed with full knowledge of this and apparent  understanding she gives her informed consent for surgery.     Findings: Right simple mastectomy completed in a irradiated field, closure over 210 mm flat Varun-Moya drains.  Left mastectomy completed and sentinel lymph node evaluation with no further elevated counts following resection full visualization of the long thoracic and thoracodorsal nerves and avoided.  Blood loss less than 50 cc.    Ms. Samuels was subsequently admitted preop and taken to surgery she had a completion mastectomy on the right, and a mastectomy and a sentinel lymph node evaluation on the left, she is done well from her surgery she has had a unremarkable operative and postoperative course currently she will be prepared and discharged to her home she will follow-up with me in 1 week for wound check, and drain management.    Procedures Performed  Procedure(s):  BREAST MASTECTOMY WITH left SENTINEL NODE BIOPSY AND AXILLARY NODE DISSECTION BILATERAL       Consults:   Consults       No orders found for last 30 day(s).            Pertinent Test Results:   Lab Results (last 24 hours)       Procedure Component Value Units Date/Time    Tissue Pathology Exam [[id number]] Collected: 11/21/23 1020    Specimen: Tissue from Breast, Right; Tissue from Breast, Left Updated: 11/21/23 1422              Condition on Discharge: Good condition      Discharge Disposition discharge to home      Discharge Medications     Discharge Medications        ASK your doctor about these medications        Instructions Start Date   aspirin 81 MG chewable tablet   81 mg, Oral, Daily      Biotin 1 MG capsule   1 capsule, Oral, Daily      CALCIUM 1000 + D PO   Oral, Daily      glucosamine sulfate 500 MG capsule capsule   Oral, 3 Times Daily With Meals      multivitamin with minerals tablet tablet   1 tablet, Oral, Daily      PROBIOTIC PO   1 capsule, Oral, Daily               Discharge Diet: Regular diet    Activity at Discharge: No limitations on range of motion, please make  sure that she has seen physical therapy prior to discharge to give her exercises for range of motion    Follow-up Appointments  Future Appointments   Date Time Provider Department Center   4/11/2024  8:00 AM  [facility]   4/18/2024  8:00 AM Karen Tineo, [facility]         Test Results Pending at Discharge  Pending Labs       Order Current Status    Tissue Pathology Exam In process             Duglas Lawrence MD  11/22/23  08:33 CST    Time: Time spent at discharge 30 minutes     Part of this note may be an electronic transcription/translation of spoken language to printed text using the Dragon Dictation System.

## 2023-11-28 NOTE — PROGRESS NOTES
Office Established Patient Note:     Referring Provider: Dr. Arie Renteria    Chief complaint status post bilateral mastectomy for left breast cancer, 11/22/2023    Subjective .     History of present illness: Adriana Samuels is a 68 y.o. female currently 5 years out from a right upper outer breast lumpectomy and sentinel lymph node evaluation in October 2018.  She is 5 years out from surgery she has had no problems she has some changes from radiation with hypervascularization in the inferior aspect of the breast and anterior chest wall as well as dimpling in the central aspect of the lumpectomy site but otherwise she is doing well she had a very minimal tumor with 1 sentinel lymph node that was positive.  She received chemotherapy and radiation is done well.  She he had a T1N1 tumor.     With this noted Ms. Samuels had a mammogram accomplished the right side her previous operative site was clear but the left had a lesion at the 2 o'clock position.  To areas were evaluated and biopsied and both showed adenocarcinoma as well as some background intraductal carcinoma in situ.  The size of the area largest span was 14 mm as best as we can tell and we have discussed this she has a tumor on the left side, the contralateral side from her operative side and she is advised of surgical options.  We can look toward completing a lumpectomy and sentinel lymph node evaluation on this left side with postoperative radiation she has been through this on the right the other option is to proceed with bilateral mastectomy and treatment of the irradiated side and previous lumpectomy site on the right as well as this most recently diagnosed cancer side on the left.  Also we would complete sentinel lymph node evaluation on the left.  She is aware of the finding the risk and benefits and after full discussion of this and apparent understanding she gives her informed consent for bilateral mastectomy and sentinel lymph node evaluation on the  left risk and benefits discussed with full knowledge of this and apparent understanding she gives her informed consent for surgery.     Findings: Right simple mastectomy completed in a irradiated field, closure over 210 mm flat Vraun-Moya drains.  Left mastectomy completed and sentinel lymph node evaluation with no further elevated counts following resection full visualization of the long thoracic and thoracodorsal nerves and avoided.  Blood loss less than 50 cc.    She is currently 1 week out from surgery she is done well from her surgery she has full range of motion, the output from the drains are reducing.  She is slowly increasing her energy and activity.    History  Past Medical History:   Diagnosis Date    Breast cancer 09/2018    right breast    Cancer     Drug therapy 09/2018-11/2018    Herpes zoster     Hx of radiation therapy 02/2019    right breast    Menorrhagia     Mononucleosis     Osteopenia     PONV (postoperative nausea and vomiting)     Shingles    ,   Past Surgical History:   Procedure Laterality Date    BREAST BIOPSY Right 09/2018    positive    BREAST LUMPECTOMY Right 09/2018    lumpectomy with chemo and rad tx    BREAST LUMPECTOMY WITH SENTINEL NODE BIOPSY Right 09/10/2018    Procedure: RIGHT BREAST LUMPECTOMY WITH SENTINEL LYMPH NODE BIOPSY, INJECTION AND SCAN;  Surgeon: Duglas Lawrence MD;  Location: [facility] OR;  Service: General    COLONOSCOPY N/A 06/06/2019    Procedure: COLONOSCOPY WITH ANESTHESIA;  Surgeon: Brock Raman MD;  Location: [facility] ENDOSCOPY;  Service: Gastroenterology    HYDROCELE EXCISION / REPAIR      LASER ABLATION OF THE CERVIX      MASTECTOMY WITH SENTINEL NODE BIOPSY AND AXILLARY NODE DISSECTION Bilateral 11/21/2023    Procedure: BREAST MASTECTOMY WITH left SENTINEL NODE BIOPSY AND AXILLARY NODE DISSECTION BILATERAL;  Surgeon: Duglas Lawrence MD;  Location: [facility] OR;  Service: General;  Laterality: Bilateral;    WRIST FUSION Left    ,   Family History    Problem Relation Age of Onset    No Known Problems Mother     Skin cancer Father     No Known Problems Sister     No Known Problems Brother     No Known Problems Sister     No Known Problems Brother     No Known Problems Son     No Known Problems Son     No Known Problems Son     No Known Problems Daughter     No Known Problems Maternal Grandmother     No Known Problems Paternal Grandmother     No Known Problems Maternal Aunt     No Known Problems Paternal Aunt     No Known Problems Other     Breast cancer Neg Hx     Colon cancer Neg Hx     Colon polyps Neg Hx     BRCA 1/2 Neg Hx     Endometrial cancer Neg Hx     Ovarian cancer Neg Hx    ,   Social History     Tobacco Use    Smoking status: Never     Passive exposure: Never    Smokeless tobacco: Never   Vaping Use    Vaping Use: Never used   Substance Use Topics    Alcohol use: Not Currently     Comment: Rare occasions in past socially    Drug use: No   , (Not in a hospital admission)   and Allergies:  Bactrim [sulfamethoxazole-trimethoprim], Cortisone, Decadron [dexamethasone], Hydrocortisone, and Erythromycin    Current Outpatient Medications:     aspirin 81 MG chewable tablet, Chew 1 tablet Daily., Disp: , Rfl:     Biotin 1 MG capsule, Take 1 capsule by mouth Daily., Disp: , Rfl:     Calcium Carb-Cholecalciferol (CALCIUM 1000 + D PO), Take  by mouth Daily., Disp: , Rfl:     clindamycin (Cleocin) 300 MG capsule, Take 1 capsule by mouth Daily., Disp: 21 capsule, Rfl: 1    glucosamine sulfate 500 MG capsule capsule, Take  by mouth 3 (Three) Times a Day With Meals., Disp: , Rfl:     HYDROcodone-acetaminophen (NORCO) 5-325 MG per tablet, Take 1 tablet by mouth Every 4 (Four) Hours As Needed for Mild Pain for up to 20 doses., Disp: 20 tablet, Rfl: 0    ketorolac (TORADOL) 10 MG tablet, Take 1 tablet by mouth Every 6 (Six) Hours As Needed for Moderate Pain for up to 20 doses., Disp: 20 tablet, Rfl: 1    methylcellulose (Citrucel) oral powder, Take 2 application  by  mouth Daily for 30 doses., Disp: 60 g, Rfl: 6    Multiple Vitamins-Minerals (MULTIVITAMIN WITH MINERALS) tablet tablet, Take 1 tablet by mouth Daily., Disp: , Rfl:     ondansetron (Zofran) 8 MG tablet, Take 0.5 tablets by mouth Every 8 (Eight) Hours As Needed for Nausea or Vomiting for up to 5 doses., Disp: 5 tablet, Rfl: 1    Probiotic Product (PROBIOTIC PO), Take 1 capsule by mouth Daily., Disp: , Rfl:     Current Facility-Administered Medications:     lidocaine (XYLOCAINE) 1 % injection 10 mL, 10 mL, Subcutaneous, Once, Gabriel Hernandez MD    lidocaine (XYLOCAINE) 1 % injection 10 mL, 10 mL, Subcutaneous, Once, Gabriel Hernandez MD    lidocaine 1% - EPINEPHrine 1:957360 (XYLOCAINE W/EPI) 1 %-1:172457 injection 10 mL, 10 mL, Injection, Once, Gabriel Hernandez MD    lidocaine 1% - EPINEPHrine 1:993292 (XYLOCAINE W/EPI) 1 %-1:617967 injection 10 mL, 10 mL, Injection, Once, Gabriel Hernandez MD    Objective     Vital Signs   There were no vitals taken for this visit.     Physical Exam:  Respirations clear and equal    Cardiovascular exam regular rate and rhythm    The chest is clean and dry, there is no erythema, there is no seroma, no excessive skin.  The drains are in place, she does have a skin blister in the mid sternal area, she has a larger blister approximately 2-1/2 cm on the right upper abdomen and right lower chest probably the source of the radiation or one of the inputs for the radiation, the skin of the incision looks good but just a blister over some of the irradiated skin on the lower aspect this is not a full-thickness injury and I think it is mainly a blister type finding.  The medial drain on the right and left were removed, the dressings were applied and a light coat of bacitracin was applied over the 2 blistered areas.  I will see her back in 2 days for repeat evaluation and potentially remove the drains if the output is low enough.  And get the patient into the shower to further  "clean these blisters and to place bacitracin or Silvadene on these.  I cannot place Silvadene as she has a sulfur allergy we will continue to use the bacitracin.Physical Exam  Chest:             Results Review:  Result Review :  Lab Results   Component Value Date    FINALDX  11/21/2023     1.  Right breast, mastectomy:  Nonneoplastic breast with prior surgical site identified.  No residual/recurrent tumor identified.    2.  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  Surgical resection margins free of invasive and in situ carcinoma.  See synoptic report for full details.    AJCC pathologic stage:  pT1c(m) N0(sn)      GROSSDES  11/21/2023     1. Breast, Right.   Received in a formalin filled container labeled with the patient's name, date of birth, and \"right breast\".  The specimen was excised at 1020 and has a cold ischemic time of 2 minutes.  The specimen has a formalin fixation time of 11-1/2 hours.  The specimen consists of a right breast measuring 13.5 cm medial to lateral by 11.9 cm superior to inferior by 2.9 cm anterior to posterior and weighs 361 g.  The attached skin ellipse measures 19.6 cm medial to lateral by 7.5 cm superior to inferior.  The skin surface looks slightly inverted.  The remaining skin is remarkable for an area of slight puckering in the lateral half of the breast measuring 1.0 x 0.8 cm.  Sectioning through this area shows dense gray-white fibrotic tissue and scarring extending to within less than 0.1 cm of the deep margin.  This part of the breast measures 0.9 cm from skin surface to posterior margin.  The posterior margin is yellow-gray, mostly intact with a dense layer of cautery artifact versus necrotic fatty tissue.  A small amount of skeletal muscle is identified in the lower outer quadrant measuring 1.5 x 0.7 cm.  The posterior margin is inked blue.  " "Sectioning reveals an ill-defined area of gray-white fibrotic tissue in the central posterior margin with focal fat necrosis.  This area measures 1.5 cm superior to inferior by 1.5 cm medial to lateral by 0.6 cm in thickness and appears to involve the posterior margin.  The remaining breast is yellow-pink, soft and lobulated with prominent fibrotic fibrous septae.  The previously described skeletal muscle on the posterior margin shows some scarring with tight adhesions to soft tissue fibrotic scar.  1A-perpendicular section of nipple  1B and 1C-representative section of scarred skin and breast parenchyma/deep margin  1D-scarred breast parenchyma with adherent skeletal muscle on deep margin  1E and 1F-central breast parenchyma scarring and fat necrosis, with deep margin    1G-random upper outer quadrant  1H-random lower outer quadrant  1I-random upper inner quadrant  1J-random lower inner quadrant    2. Breast, Left.   Received in a formalin filled container labeled with the patient's name, date of birth, and \"left breast\".  The specimen has a cold ischemic time of 5 minutes and a formalin fixation time of 10 hours.  The specimen consists of a left breast measuring 16.3 cm medial to lateral by 11.7 cm superior to inferior by 2.7 cm anterior to posterior and weighs 347 g.  A suture is identified in the lateral aspect marking a sentinel lymph node.  The small amount of axillary tissue attached to the breast is removed and allowed to fix in dissect aid.  The attached skin ellipse measures 19.7 cm medial to lateral by 10.8 cm superior to inferior.  The nipple appears unremarkable.  The remaining skin is unremarkable.  The posterior margin is yellow-tan, intact and is inked black.  Sectioning reveals 2 biopsy cavities in the 2-3 o'clock aspect of the breast.  The medial biopsy cavity clip is identified but a cavity is not easily visualized.  The biopsy clip measures 2.3 cm superior-lateral to the nipple.  The tissue " around the biopsy clip is red-tan and hemorrhagic.  Medial to the biopsy clip is a yellow-tan ill-defined area measuring 1.0 x 0.7 x 0.5 cm.  The slightly palpable area measures 1.6 cm of the posterior margin and greater than 3 cm from the remaining margins.  The second by biopsy cavity measures 1.0 cm in length by 0.4 cm in diameter.  The mass measures 0.5 cm from the posterior margin, 3.7 cm from the lateral margin, and greater than 5 cm from the remaining margins.  The biopsy cavity measures 3.1 cm lateral from the nipple.  This biopsy cavity is surrounded by a yellow-pink to gray, ill-defined firm mass measuring 1.7 x 1.4 x 1.4 cm.  The mass measures 0.5 cm from the posterior margin, 3.3 cm from the lateral margin, and greater than 5 cm from the remaining margins.  The mass measures 0.7 cm lateral to the first biopsy cavity and 0.7 cm from the possible medial lesion.     Further dissection through the breast reveals a third area of gray-white thickening in the 5:00 aspect measuring 0.7 cm from the second mass.  This nodule measures 0.8 cm in greatest dimension and measures 0.4 cm from the posterior margin, 2.7 cm from the inferior margin, 3.2 cm from the lateral margin, 10.2 cm from the superior margin, 2.3 cm deep to the skin surface, and 9.2 cm from the medial margin.  Sectioning through the remaining breast reveals yellow-pink to gray soft and lobulated fibroadipose tissue with thin fibrous septae.     The suture and the attached lateral tissue is dissected and reveals lymph node candidates measuring 1.0 and 1.1 cm in greatest dimension.  Both lymph nodes exhibit 80% fatty replacement with a rim of gray-white residual lymph node.  Further dissection reveals a third lymph node candidate measuring 1.1 cm in greatest dimension.  The cut surface exhibits 30% fatty replacement and is otherwise pink-gray and firm.    2A-perpendicular section of nipple  2B-representative section of unremarkable skin  2C-posterior  margin adjacent to possible first mass  2D-representative section of possible medial mass  2E-additional section of soft tissue around the first biopsy cavity  2F-representative section of tissue between first possible mass and second mass  2G-perpendicular sections of posterior margin adjacent to the second mass  2H through 2J-representative sections of second mass and biopsy cavity  2K-representative section of third possible mass with overlying posterior margin  2L-random upper outer quadrant  2M-random lower outer quadrant  2N-random upper inner quadrant  2O-random lower inner quadrant  2P and 2Q-bisected sentinel lymph nodes x 2  2R-1 bisected lymph node candidate         BMI is within normal parameters. No other follow-up for BMI required.    Assessment & Plan     68-year-old female with recent bilateral mastectomy pathology shows no further tumor in the right breast radiation changes noted, and on the left she had a T1 lesion the largest lesion of a 17 mm, sentinel lymph nodes x3 were negative.  Discussed BMI elevation and need to move toward a reduced BMI for health concerns she appears to understand she is a non smoker and her meds were reviewed.    I will see her back in 2 days for repeat evaluation repeat evaluation of these blistered areas, and potentially removal of the drains if possible.  Duglas Lawrenec MD  11/28/23  16:46 CST

## 2023-11-29 ENCOUNTER — OFFICE VISIT (OUTPATIENT)
Dept: SURGERY | Facility: CLINIC | Age: 68
End: 2023-11-29
Payer: MEDICARE

## 2023-11-29 VITALS — OXYGEN SATURATION: 99 % | HEART RATE: 97 BPM

## 2023-11-29 DIAGNOSIS — Z85.3 HISTORY OF LEFT BREAST CANCER: ICD-10-CM

## 2023-11-29 DIAGNOSIS — C50.412 BILATERAL MALIGNANT NEOPLASM OF UPPER OUTER QUADRANT OF BREAST IN FEMALE, UNSPECIFIED ESTROGEN RECEPTOR STATUS: Primary | ICD-10-CM

## 2023-11-29 DIAGNOSIS — C50.411 BILATERAL MALIGNANT NEOPLASM OF UPPER OUTER QUADRANT OF BREAST IN FEMALE, UNSPECIFIED ESTROGEN RECEPTOR STATUS: Primary | ICD-10-CM

## 2023-11-29 DIAGNOSIS — Z92.3 HISTORY OF RADIATION THERAPY: ICD-10-CM

## 2023-11-29 DIAGNOSIS — C50.411 MALIGNANT NEOPLASM OF UPPER-OUTER QUADRANT OF RIGHT FEMALE BREAST, UNSPECIFIED ESTROGEN RECEPTOR STATUS: ICD-10-CM

## 2023-11-29 PROCEDURE — 1159F MED LIST DOCD IN RCRD: CPT | Performed by: SPECIALIST

## 2023-11-29 PROCEDURE — 99024 POSTOP FOLLOW-UP VISIT: CPT | Performed by: SPECIALIST

## 2023-11-29 PROCEDURE — 1160F RVW MEDS BY RX/DR IN RCRD: CPT | Performed by: SPECIALIST

## 2023-11-29 NOTE — PROGRESS NOTES
Office Established Patient Note:     Referring Provider: Dr. Arie Renteria    Chief complaint follow-up bilateral mastectomy for breast cancer 11/22/2023    Subjective .     History of present illness:   Adriana Samuels is a 68 y.o. female currently 5 years out from a right upper outer breast lumpectomy and sentinel lymph node evaluation in October 2018.  She is 5 years out from surgery she has had no problems she has some changes from radiation with hypervascularization in the inferior aspect of the breast and anterior chest wall as well as dimpling in the central aspect of the lumpectomy site but otherwise she is doing well she had a very minimal tumor with 1 sentinel lymph node that was positive.  She received chemotherapy and radiation is done well.  She he had a T1N1 tumor.     With this noted Ms. Samuels had a mammogram accomplished the right side her previous operative site was clear but the left had a lesion at the 2 o'clock position.  To areas were evaluated and biopsied and both showed adenocarcinoma as well as some background intraductal carcinoma in situ.  The size of the area largest span was 14 mm as best as we can tell and we have discussed this she has a tumor on the left side, the contralateral side from her operative side and she is advised of surgical options.  We can look toward completing a lumpectomy and sentinel lymph node evaluation on this left side with postoperative radiation she has been through this on the right the other option is to proceed with bilateral mastectomy and treatment of the irradiated side and previous lumpectomy site on the right as well as this most recently diagnosed cancer side on the left.  Also we would complete sentinel lymph node evaluation on the left.  She is aware of the finding the risk and benefits and after full discussion of this and apparent understanding she gives her informed consent for bilateral mastectomy and sentinel lymph node evaluation on the left risk  and benefits discussed with full knowledge of this and apparent understanding she gives her informed consent for surgery.     Findings: Right simple mastectomy completed in a irradiated field, closure over 210 mm flat Varun-Moya drains.  Left mastectomy completed and sentinel lymph node evaluation with no further elevated counts following resection full visualization of the long thoracic and thoracodorsal nerves and avoided.  Blood loss less than 50 cc.     She is currently 1 week out from surgery she is done well from her surgery she has full range of motion, the output from the drains are reducing.  She is slowly increasing her energy and activity.    She is currently 10 days out from surgery she is here for follow-up she looks great from her surgery her drains have reduced significantly over the past 2 days, less than 12 cc out from either drain right or left for the past 2 days.  As this is the case we will remove these drains.       History  Past Medical History:   Diagnosis Date    Breast cancer 09/2018    right breast    Cancer     Drug therapy 09/2018-11/2018    Herpes zoster     Hx of radiation therapy 02/2019    right breast    Menorrhagia     Mononucleosis     Osteopenia     PONV (postoperative nausea and vomiting)     Shingles    ,   Past Surgical History:   Procedure Laterality Date    BREAST BIOPSY Right 09/2018    positive    BREAST LUMPECTOMY Right 09/2018    lumpectomy with chemo and rad tx    BREAST LUMPECTOMY WITH SENTINEL NODE BIOPSY Right 09/10/2018    Procedure: RIGHT BREAST LUMPECTOMY WITH SENTINEL LYMPH NODE BIOPSY, INJECTION AND SCAN;  Surgeon: Duglas Lawrence MD;  Location: [facility] OR;  Service: General    COLONOSCOPY N/A 06/06/2019    Procedure: COLONOSCOPY WITH ANESTHESIA;  Surgeon: Brock Raman MD;  Location: [facility] ENDOSCOPY;  Service: Gastroenterology    HYDROCELE EXCISION / REPAIR      LASER ABLATION OF THE CERVIX      MASTECTOMY WITH SENTINEL NODE BIOPSY AND AXILLARY NODE  DISSECTION Bilateral 11/21/2023    Procedure: BREAST MASTECTOMY WITH left SENTINEL NODE BIOPSY AND AXILLARY NODE DISSECTION BILATERAL;  Surgeon: Duglas Lawrence MD;  Location: [facility] OR;  Service: General;  Laterality: Bilateral;    WRIST FUSION Left    ,   Family History   Problem Relation Age of Onset    No Known Problems Mother     Skin cancer Father     No Known Problems Sister     No Known Problems Brother     No Known Problems Sister     No Known Problems Brother     No Known Problems Son     No Known Problems Son     No Known Problems Son     No Known Problems Daughter     No Known Problems Maternal Grandmother     No Known Problems Paternal Grandmother     No Known Problems Maternal Aunt     No Known Problems Paternal Aunt     No Known Problems Other     Breast cancer Neg Hx     Colon cancer Neg Hx     Colon polyps Neg Hx     BRCA 1/2 Neg Hx     Endometrial cancer Neg Hx     Ovarian cancer Neg Hx    ,   Social History     Tobacco Use    Smoking status: Never     Passive exposure: Never    Smokeless tobacco: Never   Vaping Use    Vaping Use: Never used   Substance Use Topics    Alcohol use: Not Currently     Comment: Rare occasions in past socially    Drug use: No   , (Not in a hospital admission)   and Allergies:  Bactrim [sulfamethoxazole-trimethoprim], Cortisone, Decadron [dexamethasone], Hydrocortisone, Clindamycin/lincomycin, and Erythromycin    Current Outpatient Medications:     aspirin 81 MG chewable tablet, Chew 1 tablet Daily., Disp: , Rfl:     Biotin 1 MG capsule, Take 1 capsule by mouth Daily., Disp: , Rfl:     Calcium Carb-Cholecalciferol (CALCIUM 1000 + D PO), Take  by mouth Daily., Disp: , Rfl:     clindamycin (Cleocin) 300 MG capsule, Take 1 capsule by mouth Daily., Disp: 21 capsule, Rfl: 1    glucosamine sulfate 500 MG capsule capsule, Take  by mouth 3 (Three) Times a Day With Meals., Disp: , Rfl:     ketorolac (TORADOL) 10 MG tablet, Take 1 tablet by mouth Every 6 (Six) Hours As Needed for  Moderate Pain for up to 20 doses., Disp: 20 tablet, Rfl: 1    methylcellulose (Citrucel) oral powder, Take 2 application  by mouth Daily for 30 doses., Disp: 60 g, Rfl: 6    Multiple Vitamins-Minerals (MULTIVITAMIN WITH MINERALS) tablet tablet, Take 1 tablet by mouth Daily., Disp: , Rfl:     ondansetron (Zofran) 8 MG tablet, Take 0.5 tablets by mouth Every 8 (Eight) Hours As Needed for Nausea or Vomiting for up to 5 doses., Disp: 5 tablet, Rfl: 1    Probiotic Product (PROBIOTIC PO), Take 1 capsule by mouth Daily., Disp: , Rfl:     Current Facility-Administered Medications:     lidocaine (XYLOCAINE) 1 % injection 10 mL, 10 mL, Subcutaneous, Once, Gabriel Hernandez MD    lidocaine (XYLOCAINE) 1 % injection 10 mL, 10 mL, Subcutaneous, Once, Gabriel Hernandez MD    lidocaine 1% - EPINEPHrine 1:839205 (XYLOCAINE W/EPI) 1 %-1:223919 injection 10 mL, 10 mL, Injection, Once, Gabriel Hernandez MD    lidocaine 1% - EPINEPHrine 1:939558 (XYLOCAINE W/EPI) 1 %-1:144759 injection 10 mL, 10 mL, Injection, Once, Gabriel Hernandez MD    Objective     Vital Signs   There were no vitals taken for this visit.     Physical Exam:  Nicely healed scar right and left, the blisters noted at the midsternal region, around the drain sites are healing rapidly, both the right and left lateral drains were removed, without problems, granulation tissue of the open blistered areas are occurring.Physical Exam  Chest:             Results Review:  Result Review :  Lab Results   Component Value Date    FINALDX  11/21/2023     1.  Right breast, mastectomy:  Nonneoplastic breast with prior surgical site identified.  No residual/recurrent tumor identified.    2.  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  Surgical resection margins free of invasive and in situ carcinoma.  See synoptic report for full  "details.    AJCC pathologic stage:  pT1c(m) N0(sn)      GROSSDES  11/21/2023     1. Breast, Right.   Received in a formalin filled container labeled with the patient's name, date of birth, and \"right breast\".  The specimen was excised at 1020 and has a cold ischemic time of 2 minutes.  The specimen has a formalin fixation time of 11-1/2 hours.  The specimen consists of a right breast measuring 13.5 cm medial to lateral by 11.9 cm superior to inferior by 2.9 cm anterior to posterior and weighs 361 g.  The attached skin ellipse measures 19.6 cm medial to lateral by 7.5 cm superior to inferior.  The skin surface looks slightly inverted.  The remaining skin is remarkable for an area of slight puckering in the lateral half of the breast measuring 1.0 x 0.8 cm.  Sectioning through this area shows dense gray-white fibrotic tissue and scarring extending to within less than 0.1 cm of the deep margin.  This part of the breast measures 0.9 cm from skin surface to posterior margin.  The posterior margin is yellow-gray, mostly intact with a dense layer of cautery artifact versus necrotic fatty tissue.  A small amount of skeletal muscle is identified in the lower outer quadrant measuring 1.5 x 0.7 cm.  The posterior margin is inked blue.  Sectioning reveals an ill-defined area of gray-white fibrotic tissue in the central posterior margin with focal fat necrosis.  This area measures 1.5 cm superior to inferior by 1.5 cm medial to lateral by 0.6 cm in thickness and appears to involve the posterior margin.  The remaining breast is yellow-pink, soft and lobulated with prominent fibrotic fibrous septae.  The previously described skeletal muscle on the posterior margin shows some scarring with tight adhesions to soft tissue fibrotic scar.  1A-perpendicular section of nipple  1B and 1C-representative section of scarred skin and breast parenchyma/deep margin  1D-scarred breast parenchyma with adherent skeletal muscle on deep margin  1E " "and 1F-central breast parenchyma scarring and fat necrosis, with deep margin    1G-random upper outer quadrant  1H-random lower outer quadrant  1I-random upper inner quadrant  1J-random lower inner quadrant    2. Breast, Left.   Received in a formalin filled container labeled with the patient's name, date of birth, and \"left breast\".  The specimen has a cold ischemic time of 5 minutes and a formalin fixation time of 10 hours.  The specimen consists of a left breast measuring 16.3 cm medial to lateral by 11.7 cm superior to inferior by 2.7 cm anterior to posterior and weighs 347 g.  A suture is identified in the lateral aspect marking a sentinel lymph node.  The small amount of axillary tissue attached to the breast is removed and allowed to fix in dissect aid.  The attached skin ellipse measures 19.7 cm medial to lateral by 10.8 cm superior to inferior.  The nipple appears unremarkable.  The remaining skin is unremarkable.  The posterior margin is yellow-tan, intact and is inked black.  Sectioning reveals 2 biopsy cavities in the 2-3 o'clock aspect of the breast.  The medial biopsy cavity clip is identified but a cavity is not easily visualized.  The biopsy clip measures 2.3 cm superior-lateral to the nipple.  The tissue around the biopsy clip is red-tan and hemorrhagic.  Medial to the biopsy clip is a yellow-tan ill-defined area measuring 1.0 x 0.7 x 0.5 cm.  The slightly palpable area measures 1.6 cm of the posterior margin and greater than 3 cm from the remaining margins.  The second by biopsy cavity measures 1.0 cm in length by 0.4 cm in diameter.  The mass measures 0.5 cm from the posterior margin, 3.7 cm from the lateral margin, and greater than 5 cm from the remaining margins.  The biopsy cavity measures 3.1 cm lateral from the nipple.  This biopsy cavity is surrounded by a yellow-pink to gray, ill-defined firm mass measuring 1.7 x 1.4 x 1.4 cm.  The mass measures 0.5 cm from the posterior margin, 3.3 cm from " the lateral margin, and greater than 5 cm from the remaining margins.  The mass measures 0.7 cm lateral to the first biopsy cavity and 0.7 cm from the possible medial lesion.     Further dissection through the breast reveals a third area of gray-white thickening in the 5:00 aspect measuring 0.7 cm from the second mass.  This nodule measures 0.8 cm in greatest dimension and measures 0.4 cm from the posterior margin, 2.7 cm from the inferior margin, 3.2 cm from the lateral margin, 10.2 cm from the superior margin, 2.3 cm deep to the skin surface, and 9.2 cm from the medial margin.  Sectioning through the remaining breast reveals yellow-pink to gray soft and lobulated fibroadipose tissue with thin fibrous septae.     The suture and the attached lateral tissue is dissected and reveals lymph node candidates measuring 1.0 and 1.1 cm in greatest dimension.  Both lymph nodes exhibit 80% fatty replacement with a rim of gray-white residual lymph node.  Further dissection reveals a third lymph node candidate measuring 1.1 cm in greatest dimension.  The cut surface exhibits 30% fatty replacement and is otherwise pink-gray and firm.    2A-perpendicular section of nipple  2B-representative section of unremarkable skin  2C-posterior margin adjacent to possible first mass  2D-representative section of possible medial mass  2E-additional section of soft tissue around the first biopsy cavity  2F-representative section of tissue between first possible mass and second mass  2G-perpendicular sections of posterior margin adjacent to the second mass  2H through 2J-representative sections of second mass and biopsy cavity  2K-representative section of third possible mass with overlying posterior margin  2L-random upper outer quadrant  2M-random lower outer quadrant  2N-random upper inner quadrant  2O-random lower inner quadrant  2P and 2Q-bisected sentinel lymph nodes x 2  2R-1 bisected lymph node candidate         BMI is within normal  parameters. No other follow-up for BMI required.    Assessment & Plan       Status post bilateral mastectomy presently doing well she we will continue her range of motion, follow-up with me in 1 week to assure adequate healing, continue range of motion exercises follow-up with me in 2 weeks.    Discussed BMI elevation and need to move toward a reduced BMI for health concerns she appears to understand she is a non smoker and her meds were reviewed.      Duglas Lawrence MD  11/29/23  16:07 CST

## 2023-11-29 NOTE — PATIENT INSTRUCTIONS
Thanks for coming today and following up Ms. Samuels, your incisions look great I see no problems except for an area where the radiation port was that we will slowly have to heal up we will get you down to the 2 drains today I will see you back on Friday to check on those 2 open wounds that are healing and also the drain output possibly we can get good drains out on Friday?

## 2023-12-01 ENCOUNTER — OFFICE VISIT (OUTPATIENT)
Dept: SURGERY | Facility: CLINIC | Age: 68
End: 2023-12-01
Payer: MEDICARE

## 2023-12-01 VITALS — HEIGHT: 66 IN | WEIGHT: 131 LBS | OXYGEN SATURATION: 96 % | BODY MASS INDEX: 21.05 KG/M2 | HEART RATE: 91 BPM

## 2023-12-01 DIAGNOSIS — C50.412 BILATERAL MALIGNANT NEOPLASM OF UPPER OUTER QUADRANT OF BREAST IN FEMALE, UNSPECIFIED ESTROGEN RECEPTOR STATUS: Primary | ICD-10-CM

## 2023-12-01 DIAGNOSIS — Z12.31 SCREENING MAMMOGRAM FOR HIGH-RISK PATIENT: ICD-10-CM

## 2023-12-01 DIAGNOSIS — C50.411 MALIGNANT NEOPLASM OF UPPER-OUTER QUADRANT OF RIGHT FEMALE BREAST, UNSPECIFIED ESTROGEN RECEPTOR STATUS: ICD-10-CM

## 2023-12-01 DIAGNOSIS — C50.411 BILATERAL MALIGNANT NEOPLASM OF UPPER OUTER QUADRANT OF BREAST IN FEMALE, UNSPECIFIED ESTROGEN RECEPTOR STATUS: Primary | ICD-10-CM

## 2023-12-01 PROCEDURE — 99024 POSTOP FOLLOW-UP VISIT: CPT | Performed by: SPECIALIST

## 2023-12-01 NOTE — PATIENT INSTRUCTIONS
Ms. Samuels I think you are doing absolutely fabulous from your surgery I see no problems continue doing as you are doing begin your range of motion follow-up with me this coming Wednesday at noon.

## 2023-12-05 NOTE — PROGRESS NOTES
Office Established Patient Note:     Referring Provider: Dr. Jeff Rinney    Chief complaint follow-up bilateral mastectomy for breast cancer 11/22/2023    Subjective .     History of present illness:  Adriana Samuels is a 68 y.o. female  currently 5 years out from a right upper outer breast lumpectomy and sentinel lymph node evaluation in October 2018.  She is 5 years out from surgery she has had no problems she has some changes from radiation with hypervascularization in the inferior aspect of the breast and anterior chest wall as well as dimpling in the central aspect of the lumpectomy site but otherwise she is doing well she had a very minimal tumor with 1 sentinel lymph node that was positive.  She received chemotherapy and radiation is done well.  She he had a T1N1 tumor.     With this noted Ms. Samuels had a mammogram accomplished the right side her previous operative site was clear but the left had a lesion at the 2 o'clock position.  To areas were evaluated and biopsied and both showed adenocarcinoma as well as some background intraductal carcinoma in situ.  The size of the area largest span was 14 mm as best as we can tell and we have discussed this she has a tumor on the left side, the contralateral side from her operative side and she is advised of surgical options.  We can look toward completing a lumpectomy and sentinel lymph node evaluation on this left side with postoperative radiation she has been through this on the right the other option is to proceed with bilateral mastectomy and treatment of the irradiated side and previous lumpectomy site on the right as well as this most recently diagnosed cancer side on the left.  Also we would complete sentinel lymph node evaluation on the left.  She is aware of the finding the risk and benefits and after full discussion of this and apparent understanding she gives her informed consent for bilateral mastectomy and sentinel lymph node evaluation on the left  risk and benefits discussed with full knowledge of this and apparent understanding she gives her informed consent for surgery.     Findings: Right simple mastectomy completed in a irradiated field, closure over 210 mm flat Varun-Moya drains.  Left mastectomy completed and sentinel lymph node evaluation with no further elevated counts following resection full visualization of the long thoracic and thoracodorsal nerves and avoided.  Blood loss less than 50 cc.     She is currently 1 week out from surgery she is done well from her surgery she has full range of motion, the output from the drains are reducing.  She is slowly increasing her energy and activity.     She is currently 10 days out from surgery she is here for follow-up she looks great from her surgery her drains have reduced significantly over the past 2 days, less than 12 cc out from either drain right or left for the past 2 days.  As this is the case we will remove these drains.     Ms. Samuels is currently 3 weeks out from bilateral mastectomy, she looks absolutely fabulous, her drains have been out for the past week she has no seroma no hematoma the incisions of healed nicely, the blistered areas have all healed and resurfaced, there is absolutely no infection, no problematic wound, no seroma she does have some healing ridge along the incision slightly more in the left axilla her range of motion is increasing.  And as previously noted    Pathology looks good she was ER and [place] positive and she had a T1 N0 tumor.  History  Past Medical History:   Diagnosis Date    Breast cancer 09/2018    right breast    Cancer     Drug therapy 09/2018-11/2018    Herpes zoster     Hx of radiation therapy 02/2019    right breast    Menorrhagia     Mononucleosis     Osteopenia     PONV (postoperative nausea and vomiting)     Shingles    ,   Past Surgical History:   Procedure Laterality Date    BREAST BIOPSY Right 09/2018    positive    BREAST LUMPECTOMY Right 09/2018     lumpectomy with chemo and rad tx    BREAST LUMPECTOMY WITH SENTINEL NODE BIOPSY Right 09/10/2018    Procedure: RIGHT BREAST LUMPECTOMY WITH SENTINEL LYMPH NODE BIOPSY, INJECTION AND SCAN;  Surgeon: Duglas Lawrence MD;  Location:  [facility] OR;  Service: General    COLONOSCOPY N/A 06/06/2019    Procedure: COLONOSCOPY WITH ANESTHESIA;  Surgeon: Brock Raman MD;  Location: [facility] ENDOSCOPY;  Service: Gastroenterology    HYDROCELE EXCISION / REPAIR      LASER ABLATION OF THE CERVIX      MASTECTOMY WITH SENTINEL NODE BIOPSY AND AXILLARY NODE DISSECTION Bilateral 11/21/2023    Procedure: BREAST MASTECTOMY WITH left SENTINEL NODE BIOPSY AND AXILLARY NODE DISSECTION BILATERAL;  Surgeon: Duglas Lawrence MD;  Location:  [facility] OR;  Service: General;  Laterality: Bilateral;    WRIST FUSION Left    ,   Family History   Problem Relation Age of Onset    No Known Problems Mother     Skin cancer Father     No Known Problems Sister     No Known Problems Brother     No Known Problems Sister     No Known Problems Brother     No Known Problems Son     No Known Problems Son     No Known Problems Son     No Known Problems Daughter     No Known Problems Maternal Grandmother     No Known Problems Paternal Grandmother     No Known Problems Maternal Aunt     No Known Problems Paternal Aunt     No Known Problems Other     Breast cancer Neg Hx     Colon cancer Neg Hx     Colon polyps Neg Hx     BRCA 1/2 Neg Hx     Endometrial cancer Neg Hx     Ovarian cancer Neg Hx    ,   Social History     Tobacco Use    Smoking status: Never     Passive exposure: Never    Smokeless tobacco: Never   Vaping Use    Vaping Use: Never used   Substance Use Topics    Alcohol use: Not Currently     Comment: Rare occasions in past socially    Drug use: No   , (Not in a hospital admission)   and Allergies:  Bactrim [sulfamethoxazole-trimethoprim], Cortisone, Decadron [dexamethasone], Hydrocortisone, Clindamycin/lincomycin, and Erythromycin    Current Outpatient  Medications:     aspirin 81 MG chewable tablet, Chew 1 tablet Daily., Disp: , Rfl:     Biotin 1 MG capsule, Take 1 capsule by mouth Daily., Disp: , Rfl:     Calcium Carb-Cholecalciferol (CALCIUM 1000 + D PO), Take  by mouth Daily., Disp: , Rfl:     glucosamine sulfate 500 MG capsule capsule, Take  by mouth 3 (Three) Times a Day With Meals., Disp: , Rfl:     methylcellulose (Citrucel) oral powder, Take 2 application  by mouth Daily for 30 doses., Disp: 60 g, Rfl: 6    Multiple Vitamins-Minerals (MULTIVITAMIN WITH MINERALS) tablet tablet, Take 1 tablet by mouth Daily., Disp: , Rfl:     ondansetron (Zofran) 8 MG tablet, Take 0.5 tablets by mouth Every 8 (Eight) Hours As Needed for Nausea or Vomiting for up to 5 doses., Disp: 5 tablet, Rfl: 1    Probiotic Product (PROBIOTIC PO), Take 1 capsule by mouth Daily., Disp: , Rfl:     Current Facility-Administered Medications:     lidocaine (XYLOCAINE) 1 % injection 10 mL, 10 mL, Subcutaneous, Once, Gabriel Hernandez MD    lidocaine (XYLOCAINE) 1 % injection 10 mL, 10 mL, Subcutaneous, Once, Gabriel Hernandez MD    lidocaine 1% - EPINEPHrine 1:859299 (XYLOCAINE W/EPI) 1 %-1:588880 injection 10 mL, 10 mL, Injection, Once, Gabriel Hernandez MD    lidocaine 1% - EPINEPHrine 1:537711 (XYLOCAINE W/EPI) 1 %-1:974381 injection 10 mL, 10 mL, Injection, Once, Gabriel Hernandez MD    Objective     Vital Signs   There were no vitals taken for this visit.     Physical Exam:  Respirations clear and equal  Wound on the chest clean and dry nicely healed wound right and left, no seroma no hematoma, healing ridge is noted slightly more in the left axilla, full range of motion, the skin changes noted over the chest have fully healed where the radiation changes were noted and this has resurfaced well without problems,    Pathology shows ER and CT were both positive.  And she had a T1 N0 tumor.Physical Exam  Chest:               Results Review:  Result Review :  Lab Results   Component  "Value Date    FINALDX  11/21/2023     1.  Right breast, mastectomy:  Nonneoplastic breast with prior surgical site identified.  No residual/recurrent tumor identified.    2.  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  Surgical resection margins free of invasive and in situ carcinoma.  See synoptic report for full details.    AJCC pathologic stage:  pT1c(m) N0(sn)      GROSSDES  11/21/2023     1. Breast, Right.   Received in a formalin filled container labeled with the patient's name, date of birth, and \"right breast\".  The specimen was excised at 1020 and has a cold ischemic time of 2 minutes.  The specimen has a formalin fixation time of 11-1/2 hours.  The specimen consists of a right breast measuring 13.5 cm medial to lateral by 11.9 cm superior to inferior by 2.9 cm anterior to posterior and weighs 361 g.  The attached skin ellipse measures 19.6 cm medial to lateral by 7.5 cm superior to inferior.  The skin surface looks slightly inverted.  The remaining skin is remarkable for an area of slight puckering in the lateral half of the breast measuring 1.0 x 0.8 cm.  Sectioning through this area shows dense gray-white fibrotic tissue and scarring extending to within less than 0.1 cm of the deep margin.  This part of the breast measures 0.9 cm from skin surface to posterior margin.  The posterior margin is yellow-gray, mostly intact with a dense layer of cautery artifact versus necrotic fatty tissue.  A small amount of skeletal muscle is identified in the lower outer quadrant measuring 1.5 x 0.7 cm.  The posterior margin is inked blue.  Sectioning reveals an ill-defined area of gray-white fibrotic tissue in the central posterior margin with focal fat necrosis.  This area measures 1.5 cm superior to inferior by 1.5 cm medial to lateral by 0.6 cm in thickness and appears to involve the " "posterior margin.  The remaining breast is yellow-pink, soft and lobulated with prominent fibrotic fibrous septae.  The previously described skeletal muscle on the posterior margin shows some scarring with tight adhesions to soft tissue fibrotic scar.  1A-perpendicular section of nipple  1B and 1C-representative section of scarred skin and breast parenchyma/deep margin  1D-scarred breast parenchyma with adherent skeletal muscle on deep margin  1E and 1F-central breast parenchyma scarring and fat necrosis, with deep margin    1G-random upper outer quadrant  1H-random lower outer quadrant  1I-random upper inner quadrant  1J-random lower inner quadrant    2. Breast, Left.   Received in a formalin filled container labeled with the patient's name, date of birth, and \"left breast\".  The specimen has a cold ischemic time of 5 minutes and a formalin fixation time of 10 hours.  The specimen consists of a left breast measuring 16.3 cm medial to lateral by 11.7 cm superior to inferior by 2.7 cm anterior to posterior and weighs 347 g.  A suture is identified in the lateral aspect marking a sentinel lymph node.  The small amount of axillary tissue attached to the breast is removed and allowed to fix in dissect aid.  The attached skin ellipse measures 19.7 cm medial to lateral by 10.8 cm superior to inferior.  The nipple appears unremarkable.  The remaining skin is unremarkable.  The posterior margin is yellow-tan, intact and is inked black.  Sectioning reveals 2 biopsy cavities in the 2-3 o'clock aspect of the breast.  The medial biopsy cavity clip is identified but a cavity is not easily visualized.  The biopsy clip measures 2.3 cm superior-lateral to the nipple.  The tissue around the biopsy clip is red-tan and hemorrhagic.  Medial to the biopsy clip is a yellow-tan ill-defined area measuring 1.0 x 0.7 x 0.5 cm.  The slightly palpable area measures 1.6 cm of the posterior margin and greater than 3 cm from the remaining " margins.  The second by biopsy cavity measures 1.0 cm in length by 0.4 cm in diameter.  The mass measures 0.5 cm from the posterior margin, 3.7 cm from the lateral margin, and greater than 5 cm from the remaining margins.  The biopsy cavity measures 3.1 cm lateral from the nipple.  This biopsy cavity is surrounded by a yellow-pink to gray, ill-defined firm mass measuring 1.7 x 1.4 x 1.4 cm.  The mass measures 0.5 cm from the posterior margin, 3.3 cm from the lateral margin, and greater than 5 cm from the remaining margins.  The mass measures 0.7 cm lateral to the first biopsy cavity and 0.7 cm from the possible medial lesion.     Further dissection through the breast reveals a third area of gray-white thickening in the 5:00 aspect measuring 0.7 cm from the second mass.  This nodule measures 0.8 cm in greatest dimension and measures 0.4 cm from the posterior margin, 2.7 cm from the inferior margin, 3.2 cm from the lateral margin, 10.2 cm from the superior margin, 2.3 cm deep to the skin surface, and 9.2 cm from the medial margin.  Sectioning through the remaining breast reveals yellow-pink to gray soft and lobulated fibroadipose tissue with thin fibrous septae.     The suture and the attached lateral tissue is dissected and reveals lymph node candidates measuring 1.0 and 1.1 cm in greatest dimension.  Both lymph nodes exhibit 80% fatty replacement with a rim of gray-white residual lymph node.  Further dissection reveals a third lymph node candidate measuring 1.1 cm in greatest dimension.  The cut surface exhibits 30% fatty replacement and is otherwise pink-gray and firm.    2A-perpendicular section of nipple  2B-representative section of unremarkable skin  2C-posterior margin adjacent to possible first mass  2D-representative section of possible medial mass  2E-additional section of soft tissue around the first biopsy cavity  2F-representative section of tissue between first possible mass and second  mass  2G-perpendicular sections of posterior margin adjacent to the second mass  2H through 2J-representative sections of second mass and biopsy cavity  2K-representative section of third possible mass with overlying posterior margin  2L-random upper outer quadrant  2M-random lower outer quadrant  2N-random upper inner quadrant  2O-random lower inner quadrant  2P and 2Q-bisected sentinel lymph nodes x 2  2R-1 bisected lymph node candidate         BMI is within normal parameters. No other follow-up for BMI required.    Assessment & Plan     3-week status post bilateral mastectomy and sentinel lymph node of the left side she is doing well from her surgery I will see her back in 1 months for repeat evaluation prescription given for breast prosthesis, she will continue range of motion she will follow-up with Dr. Moreno on her oncologist to review the findings I do not think any other suggestions will be made she desired not to take tamoxifen on her last cancer treatment 5 years prior, and probably will not at this point especially with bilateral mastectomies completed follow-up with me in 1 month.    Discussed BMI elevation and need to move toward a reduced BMI for health concerns she appears to understand she is a non smoker and her meds were reviewed.      Duglas Lawrence MD  12/05/23  15:29 CST

## 2023-12-06 ENCOUNTER — OFFICE VISIT (OUTPATIENT)
Dept: SURGERY | Facility: CLINIC | Age: 68
End: 2023-12-06
Payer: MEDICARE

## 2023-12-06 VITALS — HEART RATE: 81 BPM | WEIGHT: 127 LBS | BODY MASS INDEX: 20.41 KG/M2 | HEIGHT: 66 IN | OXYGEN SATURATION: 96 %

## 2023-12-06 DIAGNOSIS — Z12.31 SCREENING MAMMOGRAM FOR HIGH-RISK PATIENT: Primary | ICD-10-CM

## 2023-12-06 DIAGNOSIS — Z92.3 HISTORY OF RADIATION THERAPY: ICD-10-CM

## 2023-12-06 DIAGNOSIS — C50.411 MALIGNANT NEOPLASM OF UPPER-OUTER QUADRANT OF RIGHT FEMALE BREAST, UNSPECIFIED ESTROGEN RECEPTOR STATUS: ICD-10-CM

## 2023-12-06 DIAGNOSIS — Z85.3 HISTORY OF LEFT BREAST CANCER: ICD-10-CM

## 2023-12-06 PROCEDURE — 99024 POSTOP FOLLOW-UP VISIT: CPT | Performed by: SPECIALIST

## 2023-12-06 NOTE — PATIENT INSTRUCTIONS
Ms. Samuels I think everything looks great from your surgery I am very pleased on how you have recovered and how your incision looks as well as the pathology, continue your range of motion follow-up with me in 1 month.

## 2023-12-21 ENCOUNTER — TELEPHONE (OUTPATIENT)
Dept: ONCOLOGY | Facility: CLINIC | Age: 68
End: 2023-12-21

## 2023-12-21 NOTE — TELEPHONE ENCOUNTER
Caller: Adriana Samuels    Relationship to patient: Self    Best call back number: [phone]    Chief complaint: PATIENT HAD A DOUBLE MASTECTOMY ON 11-21-23 AND SURGEON WANTED HER TO SEE DR KERN SOONER THEN APRIL     Type of visit: FOLLOW UP    Requested date: END OF JANUARY     If rescheduling, when is the original appointment: 4-11-24 AND  4-18-24

## 2024-01-05 ENCOUNTER — OFFICE VISIT (OUTPATIENT)
Dept: SURGERY | Facility: CLINIC | Age: 69
End: 2024-01-05
Payer: MEDICARE

## 2024-01-05 VITALS — HEIGHT: 66 IN | OXYGEN SATURATION: 97 % | WEIGHT: 127 LBS | BODY MASS INDEX: 20.41 KG/M2 | HEART RATE: 90 BPM

## 2024-01-05 DIAGNOSIS — Z85.3 HISTORY OF LEFT BREAST CANCER: ICD-10-CM

## 2024-01-05 DIAGNOSIS — Z98.890 S/P LUMPECTOMY, RIGHT BREAST: ICD-10-CM

## 2024-01-05 DIAGNOSIS — C50.412 BILATERAL MALIGNANT NEOPLASM OF UPPER OUTER QUADRANT OF BREAST IN FEMALE, UNSPECIFIED ESTROGEN RECEPTOR STATUS: Primary | ICD-10-CM

## 2024-01-05 DIAGNOSIS — Z79.811 PROPHYLACTIC USE OF LETROZOLE (FEMARA): ICD-10-CM

## 2024-01-05 DIAGNOSIS — C50.411 BILATERAL MALIGNANT NEOPLASM OF UPPER OUTER QUADRANT OF BREAST IN FEMALE, UNSPECIFIED ESTROGEN RECEPTOR STATUS: Primary | ICD-10-CM

## 2024-01-05 DIAGNOSIS — Z98.890 S/P LYMPH NODE BIOPSY: ICD-10-CM

## 2024-01-05 DIAGNOSIS — Z12.31 SCREENING MAMMOGRAM FOR HIGH-RISK PATIENT: ICD-10-CM

## 2024-01-05 DIAGNOSIS — Z92.3 HISTORY OF RADIATION THERAPY: ICD-10-CM

## 2024-01-05 DIAGNOSIS — C50.411 MALIGNANT NEOPLASM OF UPPER-OUTER QUADRANT OF RIGHT FEMALE BREAST, UNSPECIFIED ESTROGEN RECEPTOR STATUS: ICD-10-CM

## 2024-01-05 PROCEDURE — 1159F MED LIST DOCD IN RCRD: CPT | Performed by: SPECIALIST

## 2024-01-05 PROCEDURE — 99024 POSTOP FOLLOW-UP VISIT: CPT | Performed by: SPECIALIST

## 2024-01-05 PROCEDURE — 1160F RVW MEDS BY RX/DR IN RCRD: CPT | Performed by: SPECIALIST

## 2024-01-05 NOTE — PROGRESS NOTES
Office Established Patient Note:     Referring Provider: Dr. Jeff Rinney    Chief complaint follow-up mastectomy for breast cancer 11/22/2023    Subjective .     History of present illness: Adriana Samuels is a 68 y.o. female  currently 5 years out from a right upper outer breast lumpectomy and sentinel lymph node evaluation in October 2018.  She is 5 years out from surgery she has had no problems she has some changes from radiation with hypervascularization in the inferior aspect of the breast and anterior chest wall as well as dimpling in the central aspect of the lumpectomy site but otherwise she is doing well she had a very minimal tumor with 1 sentinel lymph node that was positive.  She received chemotherapy and radiation is done well.  She he had a T1N1 tumor.     With this noted Ms. Samuels had a mammogram accomplished the right side her previous operative site was clear but the left had a lesion at the 2 o'clock position.  To areas were evaluated and biopsied and both showed adenocarcinoma as well as some background intraductal carcinoma in situ.  The size of the area largest span was 14 mm as best as we can tell and we have discussed this she has a tumor on the left side, the contralateral side from her operative side and she is advised of surgical options.  We can look toward completing a lumpectomy and sentinel lymph node evaluation on this left side with postoperative radiation she has been through this on the right the other option is to proceed with bilateral mastectomy and treatment of the irradiated side and previous lumpectomy site on the right as well as this most recently diagnosed cancer side on the left.  Also we would complete sentinel lymph node evaluation on the left.  She is aware of the finding the risk and benefits and after full discussion of this and apparent understanding she gives her informed consent for bilateral mastectomy and sentinel lymph node evaluation on the left risk and  benefits discussed with full knowledge of this and apparent understanding she gives her informed consent for surgery.     Findings: Right simple mastectomy completed in a irradiated field, closure over 210 mm flat Varun-Moya drains.  Left mastectomy completed and sentinel lymph node evaluation with no further elevated counts following resection full visualization of the long thoracic and thoracodorsal nerves and avoided.  Blood loss less than 50 cc.     She is currently 1 week out from surgery she is done well from her surgery she has full range of motion, the output from the drains are reducing.  She is slowly increasing her energy and activity.     She is currently 10 days out from surgery she is here for follow-up she looks great from her surgery her drains have reduced significantly over the past 2 days, less than 12 cc out from either drain right or left for the past 2 days.  As this is the case we will remove these drains.     Ms. Samuels is currently 3 weeks out from bilateral mastectomy, she looks absolutely fabulous, her drains have been out for the past week she has no seroma no hematoma the incisions of healed nicely, the blistered areas have all healed and resurfaced, there is absolutely no infection, no problematic wound, no seroma she does have some healing ridge along the incision slightly more in the left axilla her range of motion is increasing.  And as previously noted     Pathology looks good she was ER and HI positive and she had a T1 N0 tumor.  History      She is currently 6 weeks out from bilateral mastectomy she has done exceedingly well from her she is recovered nicely she has had full range of motion no seroma no hematoma, nicely healed wounds, I am very impressed on her recovery and how she has done she has no lymphedema full range of motion and her tumor was treated it was a T1 all lymph nodes were negative on the left and nicely healed contralateral mastectomy status post radiation on  the right.    History  Past Medical History:   Diagnosis Date    Breast cancer 09/2018    right breast    Cancer     Drug therapy 09/2018-11/2018    Herpes zoster     Hx of radiation therapy 02/2019    right breast    Menorrhagia     Mononucleosis     Osteopenia     PONV (postoperative nausea and vomiting)     Shingles    ,   Past Surgical History:   Procedure Laterality Date    BREAST BIOPSY Right 09/2018    positive    BREAST LUMPECTOMY Right 09/2018    lumpectomy with chemo and rad tx    BREAST LUMPECTOMY WITH SENTINEL NODE BIOPSY Right 09/10/2018    Procedure: RIGHT BREAST LUMPECTOMY WITH SENTINEL LYMPH NODE BIOPSY, INJECTION AND SCAN;  Surgeon: Duglas Lawrence MD;  Location: [facility] OR;  Service: General    COLONOSCOPY N/A 06/06/2019    Procedure: COLONOSCOPY WITH ANESTHESIA;  Surgeon: Brock Raman MD;  Location: [facility] ENDOSCOPY;  Service: Gastroenterology    HYDROCELE EXCISION / REPAIR      LASER ABLATION OF THE CERVIX      MASTECTOMY WITH SENTINEL NODE BIOPSY AND AXILLARY NODE DISSECTION Bilateral 11/21/2023    Procedure: BREAST MASTECTOMY WITH left SENTINEL NODE BIOPSY AND AXILLARY NODE DISSECTION BILATERAL;  Surgeon: Duglas Lawrence MD;  Location: [facility] OR;  Service: General;  Laterality: Bilateral;    WRIST FUSION Left    ,   Family History   Problem Relation Age of Onset    No Known Problems Mother     Skin cancer Father     No Known Problems Sister     No Known Problems Brother     No Known Problems Sister     No Known Problems Brother     No Known Problems Son     No Known Problems Son     No Known Problems Son     No Known Problems Daughter     No Known Problems Maternal Grandmother     No Known Problems Paternal Grandmother     No Known Problems Maternal Aunt     No Known Problems Paternal Aunt     No Known Problems Other     Breast cancer Neg Hx     Colon cancer Neg Hx     Colon polyps Neg Hx     BRCA 1/2 Neg Hx     Endometrial cancer Neg Hx     Ovarian cancer Neg Hx    ,   Social History  "    Tobacco Use    Smoking status: Never     Passive exposure: Never    Smokeless tobacco: Never   Vaping Use    Vaping Use: Never used   Substance Use Topics    Alcohol use: Not Currently     Comment: Rare occasions in past socially    Drug use: No   , (Not in a hospital admission)   and Allergies:  Bactrim [sulfamethoxazole-trimethoprim], Cortisone, Decadron [dexamethasone], Hydrocortisone, Clindamycin/lincomycin, and Erythromycin    Current Outpatient Medications:     aspirin 81 MG chewable tablet, Chew 1 tablet Daily., Disp: , Rfl:     Biotin 1 MG capsule, Take 1 capsule by mouth Daily., Disp: , Rfl:     Calcium Carb-Cholecalciferol (CALCIUM 1000 + D PO), Take  by mouth Daily., Disp: , Rfl:     glucosamine sulfate 500 MG capsule capsule, Take  by mouth 3 (Three) Times a Day With Meals., Disp: , Rfl:     Multiple Vitamins-Minerals (MULTIVITAMIN WITH MINERALS) tablet tablet, Take 1 tablet by mouth Daily., Disp: , Rfl:     Probiotic Product (PROBIOTIC PO), Take 1 capsule by mouth Daily., Disp: , Rfl:     Current Facility-Administered Medications:     lidocaine (XYLOCAINE) 1 % injection 10 mL, 10 mL, Subcutaneous, Once, Gabriel Hernandez MD    lidocaine (XYLOCAINE) 1 % injection 10 mL, 10 mL, Subcutaneous, Once, Gabriel Hernandez MD    lidocaine 1% - EPINEPHrine 1:879171 (XYLOCAINE W/EPI) 1 %-1:981895 injection 10 mL, 10 mL, Injection, Once, Gabriel Hernandez MD    lidocaine 1% - EPINEPHrine 1:698780 (XYLOCAINE W/EPI) 1 %-1:929696 injection 10 mL, 10 mL, Injection, Once, Gabriel Hernandez MD    Objective     Vital Signs   Pulse 90   Ht 168 cm (66.14\")   Wt 57.6 kg (127 lb)   SpO2 97%   BMI 20.41 kg/m²      Physical Exam:  Respirations clear and equal    Cardiovascular exam regular rate and rhythm    Chest is clean and dry nicely healed incisions right and left chest, no seroma no hematoma well-approximated wound no excessive skin noted, no adenopathy, no edema, full range of motion.Physical " "Exam  Chest:             Results Review:  Result Review :  Lab Results   Component Value Date    FINALDX  11/21/2023     1.  Right breast, mastectomy:  Nonneoplastic breast with prior surgical site identified.  No residual/recurrent tumor identified.    2.  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  Surgical resection margins free of invasive and in situ carcinoma.  See synoptic report for full details.    AJCC pathologic stage:  pT1c(m) N0(sn)      GROSSDES  11/21/2023     1. Breast, Right.   Received in a formalin filled container labeled with the patient's name, date of birth, and \"right breast\".  The specimen was excised at 1020 and has a cold ischemic time of 2 minutes.  The specimen has a formalin fixation time of 11-1/2 hours.  The specimen consists of a right breast measuring 13.5 cm medial to lateral by 11.9 cm superior to inferior by 2.9 cm anterior to posterior and weighs 361 g.  The attached skin ellipse measures 19.6 cm medial to lateral by 7.5 cm superior to inferior.  The skin surface looks slightly inverted.  The remaining skin is remarkable for an area of slight puckering in the lateral half of the breast measuring 1.0 x 0.8 cm.  Sectioning through this area shows dense gray-white fibrotic tissue and scarring extending to within less than 0.1 cm of the deep margin.  This part of the breast measures 0.9 cm from skin surface to posterior margin.  The posterior margin is yellow-gray, mostly intact with a dense layer of cautery artifact versus necrotic fatty tissue.  A small amount of skeletal muscle is identified in the lower outer quadrant measuring 1.5 x 0.7 cm.  The posterior margin is inked blue.  Sectioning reveals an ill-defined area of gray-white fibrotic tissue in the central posterior margin with focal fat necrosis.  This area measures 1.5 cm superior to " "inferior by 1.5 cm medial to lateral by 0.6 cm in thickness and appears to involve the posterior margin.  The remaining breast is yellow-pink, soft and lobulated with prominent fibrotic fibrous septae.  The previously described skeletal muscle on the posterior margin shows some scarring with tight adhesions to soft tissue fibrotic scar.  1A-perpendicular section of nipple  1B and 1C-representative section of scarred skin and breast parenchyma/deep margin  1D-scarred breast parenchyma with adherent skeletal muscle on deep margin  1E and 1F-central breast parenchyma scarring and fat necrosis, with deep margin    1G-random upper outer quadrant  1H-random lower outer quadrant  1I-random upper inner quadrant  1J-random lower inner quadrant    2. Breast, Left.   Received in a formalin filled container labeled with the patient's name, date of birth, and \"left breast\".  The specimen has a cold ischemic time of 5 minutes and a formalin fixation time of 10 hours.  The specimen consists of a left breast measuring 16.3 cm medial to lateral by 11.7 cm superior to inferior by 2.7 cm anterior to posterior and weighs 347 g.  A suture is identified in the lateral aspect marking a sentinel lymph node.  The small amount of axillary tissue attached to the breast is removed and allowed to fix in dissect aid.  The attached skin ellipse measures 19.7 cm medial to lateral by 10.8 cm superior to inferior.  The nipple appears unremarkable.  The remaining skin is unremarkable.  The posterior margin is yellow-tan, intact and is inked black.  Sectioning reveals 2 biopsy cavities in the 2-3 o'clock aspect of the breast.  The medial biopsy cavity clip is identified but a cavity is not easily visualized.  The biopsy clip measures 2.3 cm superior-lateral to the nipple.  The tissue around the biopsy clip is red-tan and hemorrhagic.  Medial to the biopsy clip is a yellow-tan ill-defined area measuring 1.0 x 0.7 x 0.5 cm.  The slightly palpable area " measures 1.6 cm of the posterior margin and greater than 3 cm from the remaining margins.  The second by biopsy cavity measures 1.0 cm in length by 0.4 cm in diameter.  The mass measures 0.5 cm from the posterior margin, 3.7 cm from the lateral margin, and greater than 5 cm from the remaining margins.  The biopsy cavity measures 3.1 cm lateral from the nipple.  This biopsy cavity is surrounded by a yellow-pink to gray, ill-defined firm mass measuring 1.7 x 1.4 x 1.4 cm.  The mass measures 0.5 cm from the posterior margin, 3.3 cm from the lateral margin, and greater than 5 cm from the remaining margins.  The mass measures 0.7 cm lateral to the first biopsy cavity and 0.7 cm from the possible medial lesion.     Further dissection through the breast reveals a third area of gray-white thickening in the 5:00 aspect measuring 0.7 cm from the second mass.  This nodule measures 0.8 cm in greatest dimension and measures 0.4 cm from the posterior margin, 2.7 cm from the inferior margin, 3.2 cm from the lateral margin, 10.2 cm from the superior margin, 2.3 cm deep to the skin surface, and 9.2 cm from the medial margin.  Sectioning through the remaining breast reveals yellow-pink to gray soft and lobulated fibroadipose tissue with thin fibrous septae.     The suture and the attached lateral tissue is dissected and reveals lymph node candidates measuring 1.0 and 1.1 cm in greatest dimension.  Both lymph nodes exhibit 80% fatty replacement with a rim of gray-white residual lymph node.  Further dissection reveals a third lymph node candidate measuring 1.1 cm in greatest dimension.  The cut surface exhibits 30% fatty replacement and is otherwise pink-gray and firm.    2A-perpendicular section of nipple  2B-representative section of unremarkable skin  2C-posterior margin adjacent to possible first mass  2D-representative section of possible medial mass  2E-additional section of soft tissue around the first biopsy  cavity  2F-representative section of tissue between first possible mass and second mass  2G-perpendicular sections of posterior margin adjacent to the second mass  2H through 2J-representative sections of second mass and biopsy cavity  2K-representative section of third possible mass with overlying posterior margin  2L-random upper outer quadrant  2M-random lower outer quadrant  2N-random upper inner quadrant  2O-random lower inner quadrant  2P and 2Q-bisected sentinel lymph nodes x 2  2R-1 bisected lymph node candidate         BMI is within normal parameters. No other follow-up for BMI required.    Assessment & Plan       Diagnoses and all orders for this visit:    1. Bilateral malignant neoplasm of upper outer quadrant of breast in female, unspecified estrogen receptor status (Primary)    2. Screening mammogram for high-risk patient    3. History of left breast cancer    4. History of radiation therapy    5. Malignant neoplasm of upper-outer quadrant of right female breast, unspecified estrogen receptor status  Overview:  Diagnosed in September 2018 with Stage IIA (T1b, cN1a, cM0, G2) Invasive Ductal Carcinoma, 9mm,  ER/[place] + 1/2  SNB positive for metastatic  carcinoma. Underwent Breast, right, lumpectomy and sentinel lymph node resection.  Completed chemotherapy course Taxotere/Cytoxan - 4 cycles completion on 11/27/2018.  Completed radiation, 6000 cGy to right breast, right axilla and supraclavicular region with completion on 02/25/2019      6. Prophylactic use of letrozole (Femara)    7. S/P lumpectomy, right breast    8. S/P lymph node biopsy       Patient with above problems noted she is doing very nicely from her surgery full range of motion no lymphedema no seroma no hematoma nicely healing wound.    Discussed BMI elevation and need to move toward a reduced BMI for health concerns she appears to understand she is a non smoker and her meds were reviewed.      Duglas Lawrence MD  01/05/24  10:12  CST

## 2024-01-05 NOTE — PATIENT INSTRUCTIONS
Thank you Mrs. Samuels for letting me take care of your problem you have done exceedingly well from your surgery and I appreciate your outlook and your physical therapy that you have done continue your activity follow-up with me as needed and best of luck in the future.

## 2024-01-17 NOTE — PROGRESS NOTES
[facility] ON[facility]EMATOLOGY & ONCOLOGY  [address]  [place] [place] [address] [phone]    Patient Name: Adriana Samuels  Encounter Date: 01/25/2024  YOB: 1955  Patient Number: [id number]      REASON FOR FOLLOW-UP:  Ms. Adriana Samuels is a pleasant 68-year-old  female, post menopausal, who is seen on followup for Stage IIA right breast cancer, upper outer.  She is seen 62 months from 4 cycles of adjuvant docetaxel and Cytoxan.  She had declined adjuvant letrozole.  She is seen alone.  History is considered reliable.            Oncology/Hematology History Overview Note   IAGNOSTIC ABNORMALITIES:   The patient had a mammogram from [facility] that showed a lesion 5.6 mm at the 10 o'clock position also noted on ultrasound suspicious for malignancy. The patient was referred for nodule in the right upper outer breast.  The patient was seen by Dr. Lawrence 09/05/2018.  Examination showed small palpable abnormality at 10 and 11 o'clock positions, 0.5 cm to 1 cm in size. No adenopathy noted. Patient planned for excision and sentinel node evaluation.  Pathology report 09/11/2018: Invasive ductal carcinoma intermediate grade, 1 out of 2 sentinel lymph nodes positive for metastasis. The largest metastatic focus is 2.5 mm. Negative for extracapsular extension. Tumor measures 9 mm in greatest linear dimension. Margins of resection are negative. Tumor measures 2 mm from closest anterior margin. AJCC T1b, N1a. ER/WV positive and HER-2/gregory negative.  CT abdomen and pelvis performed at [facility] on 09/21/2018 was revealing for IMPRESSION: 1. Post therapy changes right breast and axilla. 2. Linear nodularity left lung base, suspect chronic changes. 3. Otherwise, No CT evidence of definite metastatic disease in chest or acute cardiopulmonary process.   Bone scan performed at [facility] on 09/21/2018 was revealing for IMPRESSION: 1.  Focal increased tracer activity identified in the left lateral mid-cervical spine, suspect degenerative changes. Plain radiographic imaging could further characterize. 2. Otherwise, No scintigraphic evidence of osseous metastases.   Ultrasound of liver performed at [facility] on 09/26/2018 was revealing for Summary:  Multiple hepatic cysts.      PREVIOUS INTERVENTIONS:   The patient had undergone right breast lumpectomy with sentinel lymph node biopsy 09/10/2018 by Dr. Lawrence.  Adjuvant Taxotere and Cytoxan 09/25/2018 through 11/27/2018 at the [facility]. 4 cycles.   Adjuvant radiation completed 02/2019 by Dr. Williamson.  Adjuvant Femara 2.5 mg po daily, she declined.            Malignant neoplasm of upper-outer quadrant of right female breast    Initial Diagnosis    Malignant neoplasm of right female breast (CMS/HCC)     9/10/2018 Surgery    Final Diagnosis   1.  Breast, right, lumpectomy and sentinel lymph node resection:  Invasive ductal carcinoma, intermediate grade  Tumor measures 9 mm in greatest linear dimension  Margins of resection are negative  Tumor measures 2 mm from the closest, anterior, margin.     Positive for metastatic ductal carcinoma in one out of 2 sentinel lymph nodes (1/2)  The largest metastatic focus is 2.5 mm  Negative for extra capsular extension     AJCC cancer stage: pT1b, (sn)pN1a            9/21/2018 Imaging    Ct Chest With Contrast  1. Post therapy changes right breast and axilla.   2. Linear nodularity left lung base, suspect chronic changes.   3. Otherwise, No CT evidence of definite metastatic disease in chest or acute cardiopulmonary process.     Nm Bone Scan Whole Body  1. Focal increased tracer activity identified in the left lateral mid cervical spine, suspect degenerative changes. Plain radiographic imaging could further characterize.   2. Otherwise, No scintigraphic evidence of osseous metastases.     Ct Abdomen Pelvis With Contrast  1. Multiple low-attenuation  hepatic lesions, hepatic cyst most likely.   2. Suspect uterine fibroid   3. Otherwise, No CT evidence of metastatic disease or acute intra-abdominal/pelvic pathological process.      9/25/2018 - 11/27/2018 Chemotherapy    Taxotere Cytoxan     1/15/2019 - 2/25/2019 Radiation    Radiation OncologyTreatment Course:  Adriana Samuels received 6000 cGy in 30 fractions to right breast, right supraclavicular area, and right axilla via external beam radiation therapy.     10/28/2019 Imaging    Bilateral Diagnostic Mammogram:  IMPRESSION:  Posttherapeutic changes of the right breast are stable over  a one year time period and no suspicious findings are identified. The  patient is due to return in 12 months for bilateral digital mammograms.  BI-RADS Category 2, benign.     10/29/2020 Imaging    Bilateral Diagnostic Mammogram:  IMPRESSION:  1. No mammographic evidence of malignancy.  2. BI-RADS CATEGORY 2: Benign findings.  3. Recommend annual screening mammography.         PAST MEDICAL HISTORY:  ALLERGIES:  Allergies   Allergen Reactions    Bactrim [Sulfamethoxazole-Trimethoprim] Rash    Cortisone Rash    Decadron [Dexamethasone] Hives    Hydrocortisone Hives     Oral or IV    Clindamycin/Lincomycin Hives    Erythromycin Nausea Only and Rash     CURRENT MEDICATIONS:  Outpatient Encounter Medications as of 1/25/2024   Medication Sig Dispense Refill    aspirin 81 MG chewable tablet Chew 1 tablet Daily.      Biotin 1 MG capsule Take 1 capsule by mouth Daily.      Calcium Carb-Cholecalciferol (CALCIUM 1000 + D PO) Take  by mouth Daily.      glucosamine sulfate 500 MG capsule capsule Take  by mouth 3 (Three) Times a Day With Meals.      Multiple Vitamins-Minerals (MULTIVITAMIN WITH MINERALS) tablet tablet Take 1 tablet by mouth Daily.      Probiotic Product (PROBIOTIC PO) Take 1 capsule by mouth Daily.       Facility-Administered Encounter Medications as of 1/25/2024   Medication Dose Route Frequency Provider Last Rate Last Admin     lidocaine (XYLOCAINE) 1 % injection 10 mL  10 mL Subcutaneous Once HalfhiGabriel pierre MD        lidocaine (XYLOCAINE) 1 % injection 10 mL  10 mL Subcutaneous Once ConyhiGabriel pierre MD        lidocaine 1% - EPINEPHrine 1:330773 (XYLOCAINE W/EPI) 1 %-1:400189 injection 10 mL  10 mL Injection Once HalfhiGabriel pierre MD        lidocaine 1% - EPINEPHrine 1:303055 (XYLOCAINE W/EPI) 1 %-1:752149 injection 10 mL  10 mL Injection Once HalfhiGabriel pierre MD         ADULT ILLNESSES:  Patient Active Problem List   Diagnosis Code    Malignant neoplasm of upper-outer quadrant of right female breast C50.411    Non-smoker Z78.9    S/P lumpectomy, right breast Z98.890    S/P lymph node biopsy Z98.890    History of radiation therapy Z92.3    Prophylactic use of letrozole (Femara) Z79.811    Screening mammogram for high-risk patient Z12.31    History of left breast cancer Z85.3    Bilateral malignant neoplasm of upper outer quadrant of breast in female, unspecified estrogen receptor status C50.411, C50.412     SURGERIES:  Past Surgical History:   Procedure Laterality Date    BREAST BIOPSY Right 09/2018    positive    BREAST LUMPECTOMY Right 09/2018    lumpectomy with chemo and rad tx    BREAST LUMPECTOMY WITH SENTINEL NODE BIOPSY Right 09/10/2018    Procedure: RIGHT BREAST LUMPECTOMY WITH SENTINEL LYMPH NODE BIOPSY, INJECTION AND SCAN;  Surgeon: Duglas Lawrence MD;  Location: [facility] OR;  Service: General    COLONOSCOPY N/A 06/06/2019    Procedure: COLONOSCOPY WITH ANESTHESIA;  Surgeon: Brock Raman MD;  Location: [facility] ENDOSCOPY;  Service: Gastroenterology    HYDROCELE EXCISION / REPAIR      LASER ABLATION OF THE CERVIX      MASTECTOMY WITH SENTINEL NODE BIOPSY AND AXILLARY NODE DISSECTION Bilateral 11/21/2023    Procedure: BREAST MASTECTOMY WITH left SENTINEL NODE BIOPSY AND AXILLARY NODE DISSECTION BILATERAL;  Surgeon: Duglas Lawrence MD;  Location: [facility] OR;  Service: General;  Laterality: Bilateral;    WRIST  FUSION Left      HEALTH MAINTENANCE ITEMS:  Health Maintenance Due   Topic Date Due    TDAP/TD VACCINES (1 - Tdap) Never done    ZOSTER VACCINE (1 of 2) Never done    HEPATITIS C SCREENING  Never done    ANNUAL WELLNESS VISIT  Never done    Pneumococcal Vaccine 65+ (1 of 1 - PCV) Never done    INFLUENZA VACCINE  08/01/2023    COVID-19 Vaccine (6 - 2023-24 season) 09/01/2023       <no information>  Last Completed Colonoscopy            COLORECTAL CANCER SCREENING (COLONOSCOPY - Every 10 Years) Next due on 6/6/2029 06/06/2019  Surgical Procedure: COLONOSCOPY    06/06/2019  COLONOSCOPY                  Immunization History   Administered Date(s) Administered    COVID-19 (PFIZER) Purple Cap Monovalent 03/25/2021, 04/15/2021, 12/15/2021     Last Completed Mammogram       This patient has no relevant Health Maintenance data.              FAMILY HISTORY:  Family History   Problem Relation Age of Onset    No Known Problems Mother     Skin cancer Father     No Known Problems Sister     No Known Problems Brother     No Known Problems Sister     No Known Problems Brother     No Known Problems Son     No Known Problems Son     No Known Problems Son     No Known Problems Daughter     No Known Problems Maternal Grandmother     No Known Problems Paternal Grandmother     No Known Problems Maternal Aunt     No Known Problems Paternal Aunt     No Known Problems Other     Breast cancer Neg Hx     Colon cancer Neg Hx     Colon polyps Neg Hx     BRCA 1/2 Neg Hx     Endometrial cancer Neg Hx     Ovarian cancer Neg Hx      SOCIAL HISTORY:  Social History     Socioeconomic History    Marital status:    Tobacco Use    Smoking status: Never     Passive exposure: Never    Smokeless tobacco: Never   Vaping Use    Vaping Use: Never used   Substance and Sexual Activity    Alcohol use: Not Currently     Comment: Rare occasions in past socially    Drug use: No    Sexual activity: Yes     Partners: Male     Birth control/protection:  "Post-menopausal     Comment: Spouse only       REVIEW OF SYSTEMS:    Review of Systems   Constitutional:  Negative for fatigue and fever.        \"Feel good.\"   HENT:  Negative for congestion and trouble swallowing.    Eyes:  Negative for discharge and redness.   Respiratory:  Negative for shortness of breath and wheezing.    Cardiovascular:  Negative for chest pain and leg swelling.   Gastrointestinal:  Negative for abdominal pain, nausea and vomiting.   Genitourinary:  Negative for dysuria and flank pain.   Musculoskeletal:  Negative for gait problem and myalgias.   Skin:  Negative for pallor.   Allergic/Immunologic: Negative for food allergies.   Neurological:  Negative for dizziness and speech difficulty.   Hematological:  Negative for adenopathy. Does not bruise/bleed easily.   Psychiatric/Behavioral:  Negative for agitation, confusion and hallucinations.        VITAL SIGNS: /72   Pulse 94   Temp 97.4 °F (36.3 °C) (Tympanic)   Resp 18   Ht 168 cm (66.14\")   Wt 57.8 kg (127 lb 6.4 oz)   SpO2 96%   BMI 20.48 kg/m²  Lost 5 pounds.   Pain Score    01/25/24 0954   PainSc: 0-No pain       PHYSICAL EXAMINATION:     Physical Exam  Vitals reviewed.   Constitutional:       General: She is not in acute distress.  HENT:      Head: Normocephalic and atraumatic.   Eyes:      General: No scleral icterus.  Cardiovascular:      Rate and Rhythm: Normal rate.   Pulmonary:      Effort: No respiratory distress.      Breath sounds: No wheezing.      Comments: Bilateral mastectomies. No local recurrence. Examined with Yessy.   Abdominal:      General: Bowel sounds are normal.      Palpations: Abdomen is soft.      Tenderness: There is no abdominal tenderness.   Musculoskeletal:         General: No swelling.      Cervical back: Neck supple.   Skin:     General: Skin is warm.      Coloration: Skin is not pale.   Neurological:      Mental Status: She is alert and oriented to person, place, and time.   Psychiatric:         " "Mood and Affect: Mood normal.         Behavior: Behavior normal.         Thought Content: Thought content normal.         Judgment: Judgment normal.         LABS    Lab Results - Last 18 Months   Lab Units 11/16/23  1207 10/12/23  0813 04/12/23  0807 10/06/22  0802   HEMOGLOBIN g/dL 12.7 13.7 14.1 14.0   HEMATOCRIT % 40.8 45.0 44.8 46.0   MCV fL 88.1 88.1 88.4 89.7   WBC 10*3/mm3 5.30 3.80 4.24 3.96   RDW % 13.3 13.2 13.2 13.1   MPV fL 9.9 9.8 9.6 10.0   PLATELETS 10*3/mm3 225 208 224 253   IMM GRAN % %  --  0.3 0.2 0.3   NEUTROS ABS 10*3/mm3  --  2.07 2.29 2.09   LYMPHS ABS 10*3/mm3  --  1.14 1.27 1.28   MONOS ABS 10*3/mm3  --  0.39 0.39 0.35   EOS ABS 10*3/mm3  --  0.14 0.22 0.19   BASOS ABS 10*3/mm3  --  0.05 0.06 0.04   IMMATURE GRANS (ABS) 10*3/mm3  --  0.01 0.01 0.01   NRBC /100 WBC  --  0.0 0.0 0.0       Lab Results - Last 18 Months   Lab Units 11/16/23  1207 10/12/23  0813 04/12/23  0807 10/06/22  0802   GLUCOSE mg/dL 108* 85 94 67   SODIUM mmol/L 143 142 142 144   POTASSIUM mmol/L 4.4 4.5 4.7 4.7   CO2 mmol/L 31.0* 29.0 28.0 32.0*   CHLORIDE mmol/L 106 104 105 103   ANION GAP mmol/L 6.0 9.0 9.0 9.0   CREATININE mg/dL 0.61 0.67 0.63 0.74   BUN mg/dL 15 12 13 13   BUN / CREAT RATIO  24.6 17.9 20.6 17.6   CALCIUM mg/dL 9.1 9.7 9.9 9.7   ALK PHOS U/L  --  75 80 79   TOTAL PROTEIN g/dL  --  7.1 7.2 7.5   ALT (SGPT) U/L  --  10 11 13   AST (SGOT) U/L  --  17 18 16   BILIRUBIN mg/dL  --  0.6 0.7 0.6   ALBUMIN g/dL  --  4.5 4.6 4.40   GLOBULIN gm/dL  --  2.6 2.6 3.1       Lab Results - Last 18 Months   Lab Units 10/12/23  0813 04/12/23  0807 10/06/22  0802   CEA ng/mL 2.22 2.21 1.83       No results for input(s): \"IRON\", \"TIBC\", \"LABIRON\", \"FERRITIN\", \"J8UOYMZ\", \"TSH\", \"FOLATE\" in the last 29869 hours.    Invalid input(s): \"VITB12\"    Adrianaade Mohan Arvind reports a pain score of 0.          ASSESSMENT:  1.   Left breast cancer, upper outer and upper lower quadrant, multifocal. Tumor size 1.7 cm followed by 10 mm and 8 " mm.  No lymphovascular invasion.  Negative margins.  AJCC stage: 1a (pT1c, psnN0, cM0, G2).  Receptor status: Estrogen 95%, progesterone 84% and HER2/gregory negative by FISH.  Low Ki-67.  Treatment status:Patient had undergone bilateral mastectomy with sentinel lymph node resection from the left axilla on 11/21/2023.  2.   Performance status of 0.   3.   Self directed care.   4.   Osteopenia 11/3/2023.  She is on Caltrate D.  5.   Internal hemorrhoids.   6.   Elevated CEA, history of.    7.   Rash from Decadron.  8.   Grade 1 nausea from chemo, resolved.  9.   Grade 1 fatigue from chemo, resolved.   10.  Carcinoma of the right upper outer breast. Invasive ductal carcinoma:  Current Stage: AJCC IIA (pT1b, pN1a, M0, G2)   Tumor size 9 mm.  Hormone Status: %, WI 86%, and negative HER-2/gregory.  Baseline Node Status: 1/2 sentinel node positive for metastasis.   Complications of Tumor: None.   Treatment status: Post adjuvant docetaxel and cyclophosphamide.   Post adjuvant radiation 02/2019.   Declined adjuvant Femara.   Prognosis: Fair.  11.  Ductal carcinoma in situ, left, postmastectomy.           PLAN:  1.    Re: Mammogram 11/3/2023.  Suspicious microcalcifications upper outer left breast. Biopsy is recommended. BI-RADS 4. Stable postoperative changes of the right breast.  Sonogram guided biopsy 11/8/2023.  Sonography demonstrates a hypoechoic lesion with irregular margins in the upper outer quadrant of the left breast corresponding to the lesion noted on recent mammography. A total of 6 vacuum assisted core specimens were obtained the majority of which contain calcifications. A HydroMARK clip was left in place.  Also demonstrated was a 3 x 4 mm solid nodule with lobular margins approximate 1.2 cm medial to the larger lesion within the left breast. This was biopsied using a Monopty biopsy gun with a total of 6 core specimens obtained. This portion of the biopsy was somewhat difficult as the nodule was noted to  "well superiorly and inferiorly upon attempted firing of the instrument. There were at least 2 biopsies which I feel penetrated the central aspect of the lesion. A true michael X clip was left adjacent to this nodule.       Pathology report 11/13/2023.  Left breast, designated \"mass/distortion 2:00\", 4 cm from the nipple, biopsy:Invasive carcinoma of the breast, no special type, with areas of lobular pattern growth and the following features: Modified [place] grade 2 (score 3 for tubules, score 2 for nuclear grade, score 1 for mitoses).Lymphovascular space invasion: Not identified.Ductal carcinoma in situ: Present, solid and cribriform growth patterns, intermediate nuclear grade with single cell necrosis and calcifications. Extent: Tumor involves 6 of 6 cores, largest span 14 mm. Additional findings: Calcifications associated with invasive carcinoma. Left breast, designated \"mass 2:00\", 3 cm from the nipple, biopsy:  Invasive carcinoma of the breast, no special type, with the following features: Modified [place]aciel[place] grade 2 (score 3 for tubules, score 2 for nuclear grade, score 1 for mitoses).Lymphovascular space invasion: Not identified.Ductal carcinoma in situ: Not identified.Extent: 3 of 6 fragmented cores, largest span 2 mm.  Patient had undergone bilateral mastectomy with sentinel lymph node resection from the left axilla on 11/21/2023.  Pathology report 11/22/2023.1.  Right breast, mastectomy: Nonneoplastic breast with prior surgical site identified.No residual/recurrent tumor identified.  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 Associated intermediate grade ductal carcinoma in situ, solid and cribriform type.Surgical resection margins free of invasive and in situ carcinoma.AJCC pathologic stage:  pT1c(m) N0(sn).  2.    Re:  Heme status.  None.  3.    Re:  Pre-office CMP.  None.  4.    Re:  Pre-office CEA none from 2.22.  5.    Re: "  Pre-office CA27-29 none from 14.  6.    Re:  Role of Oncotype DX to determine role of adjuvant chemotherapy.  Plan for adjuvant chemotherapy if recurrence score at least 26 or greater.  7.   Refer to genetic counseling for genetic testing.  8.   Order Oncotype DX.  7.   eRx for letrozole 2.5 mg p.o. daily, 60, 3 refills if low recurrence score.  Monitor for hot flashes, weight gain, worsening bone loss and thrombosis.   8.   Continue ongoing management per primary care physician and other specialists.  9.    Plan of care discussed with patient.  Understanding expressed.  She is agreeable to proceed.  10.  Continue Caltrate D due to osteopenia.   11.  Return to the office in 1 month discussed Oncotype DX report, CBC with differential, CMP, CEA and CA 27-29, use right arm.   12.  Advance Care Planning   ACP discussion was held with the patient during this visit. Patient has an advance directive in EMR which is still valid.   13.  Further recommendations pending.              I have reviewed the assessment and plan and verified the accuracy of it. No changes to assessment and plan since the information was documented. Nahid Linton MD 01/25/24         I spent 44 total minutes, face-to-face, caring for Adriana today. Greater than 50% of this time involved counseling and/or coordination of care as documented within this note.           cc: MD Duglas Jean Baptiste MD         (Jonathan Williamson MD)

## 2024-01-25 ENCOUNTER — LAB (OUTPATIENT)
Dept: LAB | Facility: HOSPITAL | Age: 69
End: 2024-01-25
Payer: MEDICARE

## 2024-01-25 ENCOUNTER — OFFICE VISIT (OUTPATIENT)
Dept: ONCOLOGY | Facility: CLINIC | Age: 69
End: 2024-01-25
Payer: MEDICARE

## 2024-01-25 VITALS
SYSTOLIC BLOOD PRESSURE: 124 MMHG | BODY MASS INDEX: 20.48 KG/M2 | OXYGEN SATURATION: 96 % | TEMPERATURE: 97.4 F | DIASTOLIC BLOOD PRESSURE: 72 MMHG | HEIGHT: 66 IN | WEIGHT: 127.4 LBS | RESPIRATION RATE: 18 BRPM | HEART RATE: 94 BPM

## 2024-01-25 DIAGNOSIS — Z17.0 MALIGNANT NEOPLASM OF UPPER-OUTER QUADRANT OF RIGHT BREAST IN FEMALE, ESTROGEN RECEPTOR POSITIVE: Primary | ICD-10-CM

## 2024-01-25 DIAGNOSIS — C50.411 MALIGNANT NEOPLASM OF UPPER-OUTER QUADRANT OF RIGHT BREAST IN FEMALE, ESTROGEN RECEPTOR POSITIVE: Primary | ICD-10-CM

## 2024-01-25 PROBLEM — C50.412: Status: RESOLVED | Noted: 2023-11-22 | Resolved: 2024-01-25

## 2024-01-25 PROBLEM — C50.412 MALIGNANT NEOPLASM OF UPPER-OUTER QUADRANT OF LEFT BREAST IN FEMALE, ESTROGEN RECEPTOR POSITIVE: Status: ACTIVE | Noted: 2024-01-25

## 2024-01-25 NOTE — LETTER
January 25, 2024       No Recipients    Patient: Adriana Samuels   YOB: 1955   Date of Visit: 1/25/2024     Dear Arie Renteria MD:       Thank you for referring Adriana Samuels to me for evaluation. Below are the relevant portions of my assessment and plan of care.    If you have questions, please do not hesitate to call me. I look forward to following Adriana along with you.         Sincerely,        Nahid Linton MD        CC:   No Recipients    Nahid Linton MD  01/25/24 1023  Sign when Signing Visit  [facility] HEMATOLOGY & ONCOLOGY  [address]  [place] [id number] [phone]    Patient Name: Adriana Samuels  Encounter Date: 01/25/2024  YOB: 1955  Patient Number: [id number]      REASON FOR FOLLOW-UP:  Ms. Adriana Samuels is a pleasant 68-year-old  female, post menopausal, who is seen on followup for Stage IIA right breast cancer, upper outer.  She is seen 62 months from 4 cycles of adjuvant docetaxel and Cytoxan.  She had declined adjuvant letrozole.  She is seen alone.  History is considered reliable.            Oncology/Hematology History Overview Note   IAGNOSTIC ABNORMALITIES:   The patient had a mammogram from [facility] that showed a lesion 5.6 mm at the 10 o'clock position also noted on ultrasound suspicious for malignancy. The patient was referred for nodule in the right upper outer breast.  The patient was seen by Dr. Lawrence 09/05/2018.  Examination showed small palpable abnormality at 10 and 11 o'clock positions, 0.5 cm to 1 cm in size. No adenopathy noted. Patient planned for excision and sentinel node evaluation.  Pathology report 09/11/2018: Invasive ductal carcinoma intermediate grade, 1 out of 2 sentinel lymph nodes positive for metastasis. The largest metastatic focus is 2.5 mm. Negative for extracapsular extension. Tumor measures 9 mm in greatest linear dimension. Margins of  resection are negative. Tumor measures 2 mm from closest anterior margin. AJCC T1b, N1a. ER/NY positive and HER-2/gregory negative.  CT abdomen and pelvis performed at [facility] on 09/21/2018 was revealing for IMPRESSION: 1. Post therapy changes right breast and axilla. 2. Linear nodularity left lung base, suspect chronic changes. 3. Otherwise, No CT evidence of definite metastatic disease in chest or acute cardiopulmonary process.   Bone scan performed at [facility] on 09/21/2018 was revealing for IMPRESSION: 1. Focal increased tracer activity identified in the left lateral mid-cervical spine, suspect degenerative changes. Plain radiographic imaging could further characterize. 2. Otherwise, No scintigraphic evidence of osseous metastases.   Ultrasound of liver performed at [facility] on 09/26/2018 was revealing for Summary:  Multiple hepatic cysts.      PREVIOUS INTERVENTIONS:   The patient had undergone right breast lumpectomy with sentinel lymph node biopsy 09/10/2018 by Dr. Lawrence.  Adjuvant Taxotere and Cytoxan 09/25/2018 through 11/27/2018 at the [facility]. 4 cycles.   Adjuvant radiation completed 02/2019 by Dr. Williamson.  Adjuvant Femara 2.5 mg po daily, she declined.            Malignant neoplasm of upper-outer quadrant of right female breast    Initial Diagnosis    Malignant neoplasm of right female breast (CMS/HCC)     9/10/2018 Surgery    Final Diagnosis   1.  Breast, right, lumpectomy and sentinel lymph node resection:  Invasive ductal carcinoma, intermediate grade  Tumor measures 9 mm in greatest linear dimension  Margins of resection are negative  Tumor measures 2 mm from the closest, anterior, margin.     Positive for metastatic ductal carcinoma in one out of 2 sentinel lymph nodes (1/2)  The largest metastatic focus is 2.5 mm  Negative for extra capsular extension     AJCC cancer stage: pT1b, (sn)pN1a            9/21/2018 Imaging    Ct Chest With Contrast  1. Post therapy changes  right breast and axilla.   2. Linear nodularity left lung base, suspect chronic changes.   3. Otherwise, No CT evidence of definite metastatic disease in chest or acute cardiopulmonary process.     Nm Bone Scan Whole Body  1. Focal increased tracer activity identified in the left lateral mid cervical spine, suspect degenerative changes. Plain radiographic imaging could further characterize.   2. Otherwise, No scintigraphic evidence of osseous metastases.     Ct Abdomen Pelvis With Contrast  1. Multiple low-attenuation hepatic lesions, hepatic cyst most likely.   2. Suspect uterine fibroid   3. Otherwise, No CT evidence of metastatic disease or acute intra-abdominal/pelvic pathological process.      9/25/2018 - 11/27/2018 Chemotherapy    Taxotere Cytoxan     1/15/2019 - 2/25/2019 Radiation    Radiation OncologyTreatment Course:  Adriana Samuels received 6000 cGy in 30 fractions to right breast, right supraclavicular area, and right axilla via external beam radiation therapy.     10/28/2019 Imaging    Bilateral Diagnostic Mammogram:  IMPRESSION:  Posttherapeutic changes of the right breast are stable over  a one year time period and no suspicious findings are identified. The  patient is due to return in 12 months for bilateral digital mammograms.  BI-RADS Category 2, benign.     10/29/2020 Imaging    Bilateral Diagnostic Mammogram:  IMPRESSION:  1. No mammographic evidence of malignancy.  2. BI-RADS CATEGORY 2: Benign findings.  3. Recommend annual screening mammography.         PAST MEDICAL HISTORY:  ALLERGIES:  Allergies   Allergen Reactions   • Bactrim [Sulfamethoxazole-Trimethoprim] Rash   • Cortisone Rash   • Decadron [Dexamethasone] Hives   • Hydrocortisone Hives     Oral or IV   • Clindamycin/Lincomycin Hives   • Erythromycin Nausea Only and Rash     CURRENT MEDICATIONS:  Outpatient Encounter Medications as of 1/25/2024   Medication Sig Dispense Refill   • aspirin 81 MG chewable tablet Chew 1 tablet Daily.     •  Biotin 1 MG capsule Take 1 capsule by mouth Daily.     • Calcium Carb-Cholecalciferol (CALCIUM 1000 + D PO) Take  by mouth Daily.     • glucosamine sulfate 500 MG capsule capsule Take  by mouth 3 (Three) Times a Day With Meals.     • Multiple Vitamins-Minerals (MULTIVITAMIN WITH MINERALS) tablet tablet Take 1 tablet by mouth Daily.     • Probiotic Product (PROBIOTIC PO) Take 1 capsule by mouth Daily.       Facility-Administered Encounter Medications as of 1/25/2024   Medication Dose Route Frequency Provider Last Rate Last Admin   • lidocaine (XYLOCAINE) 1 % injection 10 mL  10 mL Subcutaneous Once Gabriel Hernandez MD       • lidocaine (XYLOCAINE) 1 % injection 10 mL  10 mL Subcutaneous Once Gabriel Hernandez MD       • lidocaine 1% - EPINEPHrine 1:826240 (XYLOCAINE W/EPI) 1 %-1:267122 injection 10 mL  10 mL Injection Once Gabriel Hernandez MD       • lidocaine 1% - EPINEPHrine 1:359337 (XYLOCAINE W/EPI) 1 %-1:605313 injection 10 mL  10 mL Injection Once Gabriel Hernandez MD         ADULT ILLNESSES:  Patient Active Problem List   Diagnosis Code   • Malignant neoplasm of upper-outer quadrant of right female breast C50.411   • Non-smoker Z78.9   • S/P lumpectomy, right breast Z98.890   • S/P lymph node biopsy Z98.890   • History of radiation therapy Z92.3   • Prophylactic use of letrozole (Femara) Z79.811   • Screening mammogram for high-risk patient Z12.31   • History of left breast cancer Z85.3   • Bilateral malignant neoplasm of upper outer quadrant of breast in female, unspecified estrogen receptor status C50.411, C50.412     SURGERIES:  Past Surgical History:   Procedure Laterality Date   • BREAST BIOPSY Right 09/2018    positive   • BREAST LUMPECTOMY Right 09/2018    lumpectomy with chemo and rad tx   • BREAST LUMPECTOMY WITH SENTINEL NODE BIOPSY Right 09/10/2018    Procedure: RIGHT BREAST LUMPECTOMY WITH SENTINEL LYMPH NODE BIOPSY, INJECTION AND SCAN;  Surgeon: Duglas Lawrence MD;  Location:   [facility] OR;  Service: General   • COLONOSCOPY N/A 06/06/2019    Procedure: COLONOSCOPY WITH ANESTHESIA;  Surgeon: Brock Raman MD;  Location:  [facility] ENDOSCOPY;  Service: Gastroenterology   • HYDROCELE EXCISION / REPAIR     • LASER ABLATION OF THE CERVIX     • MASTECTOMY WITH SENTINEL NODE BIOPSY AND AXILLARY NODE DISSECTION Bilateral 11/21/2023    Procedure: BREAST MASTECTOMY WITH left SENTINEL NODE BIOPSY AND AXILLARY NODE DISSECTION BILATERAL;  Surgeon: Duglas Lawrence MD;  Location:  [facility] OR;  Service: General;  Laterality: Bilateral;   • WRIST FUSION Left      HEALTH MAINTENANCE ITEMS:  Health Maintenance Due   Topic Date Due   • TDAP/TD VACCINES (1 - Tdap) Never done   • ZOSTER VACCINE (1 of 2) Never done   • HEPATITIS C SCREENING  Never done   • ANNUAL WELLNESS VISIT  Never done   • Pneumococcal Vaccine 65+ (1 of 1 - PCV) Never done   • INFLUENZA VACCINE  08/01/2023   • COVID-19 Vaccine (6 - 2023-24 season) 09/01/2023       <no information>  Last Completed Colonoscopy            COLORECTAL CANCER SCREENING (COLONOSCOPY - Every 10 Years) Next due on 6/6/2029 06/06/2019  Surgical Procedure: COLONOSCOPY    06/06/2019  COLONOSCOPY                  Immunization History   Administered Date(s) Administered   • COVID-19 (PFIZER) Purple Cap Monovalent 03/25/2021, 04/15/2021, 12/15/2021     Last Completed Mammogram       This patient has no relevant Health Maintenance data.              FAMILY HISTORY:  Family History   Problem Relation Age of Onset   • No Known Problems Mother    • Skin cancer Father    • No Known Problems Sister    • No Known Problems Brother    • No Known Problems Sister    • No Known Problems Brother    • No Known Problems Son    • No Known Problems Son    • No Known Problems Son    • No Known Problems Daughter    • No Known Problems Maternal Grandmother    • No Known Problems Paternal Grandmother    • No Known Problems Maternal Aunt    • No Known Problems Paternal Aunt    • No Known  "Problems Other    • Breast cancer Neg Hx    • Colon cancer Neg Hx    • Colon polyps Neg Hx    • BRCA 1/2 Neg Hx    • Endometrial cancer Neg Hx    • Ovarian cancer Neg Hx      SOCIAL HISTORY:  Social History     Socioeconomic History   • Marital status:    Tobacco Use   • Smoking status: Never     Passive exposure: Never   • Smokeless tobacco: Never   Vaping Use   • Vaping Use: Never used   Substance and Sexual Activity   • Alcohol use: Not Currently     Comment: Rare occasions in past socially   • Drug use: No   • Sexual activity: Yes     Partners: Male     Birth control/protection: Post-menopausal     Comment: Spouse only       REVIEW OF SYSTEMS:    Review of Systems   Constitutional:  Negative for fatigue and fever.        \"Feel good.\"   HENT:  Negative for congestion and trouble swallowing.    Eyes:  Negative for discharge and redness.   Respiratory:  Negative for shortness of breath and wheezing.    Cardiovascular:  Negative for chest pain and leg swelling.   Gastrointestinal:  Negative for abdominal pain, nausea and vomiting.   Genitourinary:  Negative for dysuria and flank pain.   Musculoskeletal:  Negative for gait problem and myalgias.   Skin:  Negative for pallor.   Allergic/Immunologic: Negative for food allergies.   Neurological:  Negative for dizziness and speech difficulty.   Hematological:  Negative for adenopathy. Does not bruise/bleed easily.   Psychiatric/Behavioral:  Negative for agitation, confusion and hallucinations.        VITAL SIGNS: /72   Pulse 94   Temp 97.4 °F (36.3 °C) (Tympanic)   Resp 18   Ht 168 cm (66.14\")   Wt 57.8 kg (127 lb 6.4 oz)   SpO2 96%   BMI 20.48 kg/m²  Lost 5 pounds.   Pain Score    01/25/24 0954   PainSc: 0-No pain       PHYSICAL EXAMINATION:     Physical Exam  Vitals reviewed.   Constitutional:       General: She is not in acute distress.  HENT:      Head: Normocephalic and atraumatic.   Eyes:      General: No scleral icterus.  Cardiovascular:      " Rate and Rhythm: Normal rate.   Pulmonary:      Effort: No respiratory distress.      Breath sounds: No wheezing.      Comments: Bilateral mastectomies. No local recurrence. Examined with Yessy.   Abdominal:      General: Bowel sounds are normal.      Palpations: Abdomen is soft.      Tenderness: There is no abdominal tenderness.   Musculoskeletal:         General: No swelling.      Cervical back: Neck supple.   Skin:     General: Skin is warm.      Coloration: Skin is not pale.   Neurological:      Mental Status: She is alert and oriented to person, place, and time.   Psychiatric:         Mood and Affect: Mood normal.         Behavior: Behavior normal.         Thought Content: Thought content normal.         Judgment: Judgment normal.         LABS    Lab Results - Last 18 Months   Lab Units 11/16/23  1207 10/12/23  0813 04/12/23  0807 10/06/22  0802   HEMOGLOBIN g/dL 12.7 13.7 14.1 14.0   HEMATOCRIT % 40.8 45.0 44.8 46.0   MCV fL 88.1 88.1 88.4 89.7   WBC 10*3/mm3 5.30 3.80 4.24 3.96   RDW % 13.3 13.2 13.2 13.1   MPV fL 9.9 9.8 9.6 10.0   PLATELETS 10*3/mm3 225 208 224 253   IMM GRAN % %  --  0.3 0.2 0.3   NEUTROS ABS 10*3/mm3  --  2.07 2.29 2.09   LYMPHS ABS 10*3/mm3  --  1.14 1.27 1.28   MONOS ABS 10*3/mm3  --  0.39 0.39 0.35   EOS ABS 10*3/mm3  --  0.14 0.22 0.19   BASOS ABS 10*3/mm3  --  0.05 0.06 0.04   IMMATURE GRANS (ABS) 10*3/mm3  --  0.01 0.01 0.01   NRBC /100 WBC  --  0.0 0.0 0.0       Lab Results - Last 18 Months   Lab Units 11/16/23  1207 10/12/23  0813 04/12/23  0807 10/06/22  0802   GLUCOSE mg/dL 108* 85 94 67   SODIUM mmol/L 143 142 142 144   POTASSIUM mmol/L 4.4 4.5 4.7 4.7   CO2 mmol/L 31.0* 29.0 28.0 32.0*   CHLORIDE mmol/L 106 104 105 103   ANION GAP mmol/L 6.0 9.0 9.0 9.0   CREATININE mg/dL 0.61 0.67 0.63 0.74   BUN mg/dL 15 12 13 13   BUN / CREAT RATIO  24.6 17.9 20.6 17.6   CALCIUM mg/dL 9.1 9.7 9.9 9.7   ALK PHOS U/L  --  75 80 79   TOTAL PROTEIN g/dL  --  7.1 7.2 7.5   ALT (SGPT) U/L  --   "10 11 13   AST (SGOT) U/L  --  17 18 16   BILIRUBIN mg/dL  --  0.6 0.7 0.6   ALBUMIN g/dL  --  4.5 4.6 4.40   GLOBULIN gm/dL  --  2.6 2.6 3.1       Lab Results - Last 18 Months   Lab Units 10/12/23  0813 04/12/23  0807 10/06/22  0802   CEA ng/mL 2.22 2.21 1.83       No results for input(s): \"IRON\", \"TIBC\", \"LABIRON\", \"FERRITIN\", \"V8YBOCI\", \"TSH\", \"FOLATE\" in the last 15335 hours.    Invalid input(s): \"VITB12\"    Adriana Mohan Arvind reports a pain score of 0.          ASSESSMENT:  1.   Left breast cancer, upper outer and upper lower quadrant, multifocal. Tumor size 1.7 cm followed by 10 mm and 8 mm.  No lymphovascular invasion.  Negative margins.  AJCC stage: 1a (pT1c, psnN0, cM0, G2).  Receptor status: Estrogen 95%, progesterone 84% and HER2/gregory negative by FISH.  Low Ki-67.  Treatment status:Patient had undergone bilateral mastectomy with sentinel lymph node resection from the left axilla on 11/21/2023.  2.   Performance status of 0.   3.   Self directed care.   4.   Osteopenia 11/3/2023.  She is on Caltrate D.  5.   Internal hemorrhoids.   6.   Elevated CEA, history of.    7.   Rash from Decadron.  8.   Grade 1 nausea from chemo, resolved.  9.   Grade 1 fatigue from chemo, resolved.   10.  Carcinoma of the right upper outer breast. Invasive ductal carcinoma:  Current Stage: AJCC IIA (pT1b, pN1a, M0, G2)   Tumor size 9 mm.  Hormone Status: %, GA 86%, and negative HER-2/gregory.  Baseline Node Status: 1/2 sentinel node positive for metastasis.   Complications of Tumor: None.   Treatment status: Post adjuvant docetaxel and cyclophosphamide.   Post adjuvant radiation 02/2019.   Declined adjuvant Femara.   Prognosis: Fair.  11.  Ductal carcinoma in situ, left, postmastectomy.           PLAN:  1.    Re: Mammogram 11/3/2023.  Suspicious microcalcifications upper outer left breast. Biopsy is recommended. BI-RADS 4. Stable postoperative changes of the right breast.  Sonogram guided biopsy 11/8/2023.  Sonography " "demonstrates a hypoechoic lesion with irregular margins in the upper outer quadrant of the left breast corresponding to the lesion noted on recent mammography. A total of 6 vacuum assisted core specimens were obtained the majority of which contain calcifications. A HydroMARK clip was left in place.  Also demonstrated was a 3 x 4 mm solid nodule with lobular margins approximate 1.2 cm medial to the larger lesion within the left breast. This was biopsied using a Monopty biopsy gun with a total of 6 core specimens obtained. This portion of the biopsy was somewhat difficult as the nodule was noted to well superiorly and inferiorly upon attempted firing of the instrument. There were at least 2 biopsies which I feel penetrated the central aspect of the lesion. A true michael X clip was left adjacent to this nodule.       Pathology report 11/13/2023.  Left breast, designated \"mass/distortion 2:00\", 4 cm from the nipple, biopsy:Invasive carcinoma of the breast, no special type, with areas of lobular pattern growth and the following features: Modified Graciela grade 2 (score 3 for tubules, score 2 for nuclear grade, score 1 for mitoses).Lymphovascular space invasion: Not identified.Ductal carcinoma in situ: Present, solid and cribriform growth patterns, intermediate nuclear grade with single cell necrosis and calcifications. Extent: Tumor involves 6 of 6 cores, largest span 14 mm. Additional findings: Calcifications associated with invasive carcinoma. Left breast, designated \"mass 2:00\", 3 cm from the nipple, biopsy:  Invasive carcinoma of the breast, no special type, with the following features: Modified Graciela grade 2 (score 3 for tubules, score 2 for nuclear grade, score 1 for mitoses).Lymphovascular space invasion: Not identified.Ductal carcinoma in situ: Not identified.Extent: 3 of 6 fragmented cores, largest span 2 mm.  Patient had undergone bilateral mastectomy with sentinel lymph node resection from the left " axilla on 11/21/2023.  Pathology report 11/22/2023.1.  Right breast, mastectomy: Nonneoplastic breast with prior surgical site identified.No residual/recurrent tumor identified.  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 Associated intermediate grade ductal carcinoma in situ, solid and cribriform type.Surgical resection margins free of invasive and in situ carcinoma.AJCC pathologic stage:  pT1c(m) N0(sn).  2.    Re:  Heme status.  None.  3.    Re:  Pre-office CMP.  None.  4.    Re:  Pre-office CEA none from 2.22.  5.    Re:  Pre-office CA27-29 none from 14.  6.    Re:  Role of Oncotype DX to determine role of adjuvant chemotherapy.  Plan for adjuvant chemotherapy if recurrence score at least 26 or greater.  7.   Refer to genetic counseling for genetic testing.  8.   Order Oncotype DX.  7.   eRx for letrozole 2.5 mg p.o. daily, 60, 3 refills if low recurrence score.  Monitor for hot flashes, weight gain, worsening bone loss and thrombosis.   8.   Continue ongoing management per primary care physician and other specialists.  9.    Plan of care discussed with patient.  Understanding expressed.  She is agreeable to proceed.  10.  Continue Caltrate D due to osteopenia.   11.  Return to the office in 1 month discussed Oncotype DX report, CBC with differential, CMP, CEA and CA 27-29, use right arm.   12.  Advance Care Planning  ACP discussion was held with the patient during this visit. Patient has an advance directive in EMR which is still valid.   13.  Further recommendations pending.              I have reviewed the assessment and plan and verified the accuracy of it. No changes to assessment and plan since the information was documented. Nahid Linton MD 01/25/24         I spent *** +  24   total minutes, face-to-face, caring for Adriana today. Greater than 50% of this time involved counseling and/or coordination of care as documented  within this note.           cc: MD Duglas Jean Baptiste MD         (Jonathan Williamson MD)

## 2024-01-25 NOTE — LETTER
January 25, 2024       No Recipients    Patient: Adriana Samuels   YOB: 1955   Date of Visit: 1/25/2024     Dear Arie Renteria MD:       Thank you for referring Adriana Samuels to me for evaluation. Below are the relevant portions of my assessment and plan of care.    If you have questions, please do not hesitate to call me. I look forward to following Adriana along with you.         Sincerely,        Nahid Linton MD        CC:   No Recipients    Nahid Linton MD  01/25/24 1024  Sign when Signing Visit  [facility] HEMATOLOGY & ONCOLOGY  [address]  [place] [id number] [phone]    Patient Name: Adriana Samuels  Encounter Date: 01/25/2024  YOB: 1955  Patient Number: [id number]      REASON FOR FOLLOW-UP:  Ms. Adriana Samuels is a pleasant 68-year-old  female, post menopausal, who is seen on followup for Stage IIA right breast cancer, upper outer.  She is seen 62 months from 4 cycles of adjuvant docetaxel and Cytoxan.  She had declined adjuvant letrozole.  She is seen alone.  History is considered reliable.            Oncology/Hematology History Overview Note   IAGNOSTIC ABNORMALITIES:   The patient had a mammogram from [facility] that showed a lesion 5.6 mm at the 10 o'clock position also noted on ultrasound suspicious for malignancy. The patient was referred for nodule in the right upper outer breast.  The patient was seen by Dr. Lawrence 09/05/2018.  Examination showed small palpable abnormality at 10 and 11 o'clock positions, 0.5 cm to 1 cm in size. No adenopathy noted. Patient planned for excision and sentinel node evaluation.  Pathology report 09/11/2018: Invasive ductal carcinoma intermediate grade, 1 out of 2 sentinel lymph nodes positive for metastasis. The largest metastatic focus is 2.5 mm. Negative for extracapsular extension. Tumor measures 9 mm in greatest linear dimension. Margins of  resection are negative. Tumor measures 2 mm from closest anterior margin. AJCC T1b, N1a. ER/CT positive and HER-2/gregory negative.  CT abdomen and pelvis performed at [facility] on 09/21/2018 was revealing for IMPRESSION: 1. Post therapy changes right breast and axilla. 2. Linear nodularity left lung base, suspect chronic changes. 3. Otherwise, No CT evidence of definite metastatic disease in chest or acute cardiopulmonary process.   Bone scan performed at [facility] on 09/21/2018 was revealing for IMPRESSION: 1. Focal increased tracer activity identified in the left lateral mid-cervical spine, suspect degenerative changes. Plain radiographic imaging could further characterize. 2. Otherwise, No scintigraphic evidence of osseous metastases.   Ultrasound of liver performed at [facility] on 09/26/2018 was revealing for Summary:  Multiple hepatic cysts.      PREVIOUS INTERVENTIONS:   The patient had undergone right breast lumpectomy with sentinel lymph node biopsy 09/10/2018 by Dr. Lawrence.  Adjuvant Taxotere and Cytoxan 09/25/2018 through 11/27/2018 at the [facility]. 4 cycles.   Adjuvant radiation completed 02/2019 by Dr. Williamson.  Adjuvant Femara 2.5 mg po daily, she declined.            Malignant neoplasm of upper-outer quadrant of right female breast    Initial Diagnosis    Malignant neoplasm of right female breast (CMS/HCC)     9/10/2018 Surgery    Final Diagnosis   1.  Breast, right, lumpectomy and sentinel lymph node resection:  Invasive ductal carcinoma, intermediate grade  Tumor measures 9 mm in greatest linear dimension  Margins of resection are negative  Tumor measures 2 mm from the closest, anterior, margin.     Positive for metastatic ductal carcinoma in one out of 2 sentinel lymph nodes (1/2)  The largest metastatic focus is 2.5 mm  Negative for extra capsular extension     AJCC cancer stage: pT1b, (sn)pN1a            9/21/2018 Imaging    Ct Chest With Contrast  1. Post therapy changes  right breast and axilla.   2. Linear nodularity left lung base, suspect chronic changes.   3. Otherwise, No CT evidence of definite metastatic disease in chest or acute cardiopulmonary process.     Nm Bone Scan Whole Body  1. Focal increased tracer activity identified in the left lateral mid cervical spine, suspect degenerative changes. Plain radiographic imaging could further characterize.   2. Otherwise, No scintigraphic evidence of osseous metastases.     Ct Abdomen Pelvis With Contrast  1. Multiple low-attenuation hepatic lesions, hepatic cyst most likely.   2. Suspect uterine fibroid   3. Otherwise, No CT evidence of metastatic disease or acute intra-abdominal/pelvic pathological process.      9/25/2018 - 11/27/2018 Chemotherapy    Taxotere Cytoxan     1/15/2019 - 2/25/2019 Radiation    Radiation OncologyTreatment Course:  Adriana Samuels received 6000 cGy in 30 fractions to right breast, right supraclavicular area, and right axilla via external beam radiation therapy.     10/28/2019 Imaging    Bilateral Diagnostic Mammogram:  IMPRESSION:  Posttherapeutic changes of the right breast are stable over  a one year time period and no suspicious findings are identified. The  patient is due to return in 12 months for bilateral digital mammograms.  BI-RADS Category 2, benign.     10/29/2020 Imaging    Bilateral Diagnostic Mammogram:  IMPRESSION:  1. No mammographic evidence of malignancy.  2. BI-RADS CATEGORY 2: Benign findings.  3. Recommend annual screening mammography.         PAST MEDICAL HISTORY:  ALLERGIES:  Allergies   Allergen Reactions   • Bactrim [Sulfamethoxazole-Trimethoprim] Rash   • Cortisone Rash   • Decadron [Dexamethasone] Hives   • Hydrocortisone Hives     Oral or IV   • Clindamycin/Lincomycin Hives   • Erythromycin Nausea Only and Rash     CURRENT MEDICATIONS:  Outpatient Encounter Medications as of 1/25/2024   Medication Sig Dispense Refill   • aspirin 81 MG chewable tablet Chew 1 tablet Daily.     •  Biotin 1 MG capsule Take 1 capsule by mouth Daily.     • Calcium Carb-Cholecalciferol (CALCIUM 1000 + D PO) Take  by mouth Daily.     • glucosamine sulfate 500 MG capsule capsule Take  by mouth 3 (Three) Times a Day With Meals.     • Multiple Vitamins-Minerals (MULTIVITAMIN WITH MINERALS) tablet tablet Take 1 tablet by mouth Daily.     • Probiotic Product (PROBIOTIC PO) Take 1 capsule by mouth Daily.       Facility-Administered Encounter Medications as of 1/25/2024   Medication Dose Route Frequency Provider Last Rate Last Admin   • lidocaine (XYLOCAINE) 1 % injection 10 mL  10 mL Subcutaneous Once Gabriel Hernandez MD       • lidocaine (XYLOCAINE) 1 % injection 10 mL  10 mL Subcutaneous Once Gabriel Hernandez MD       • lidocaine 1% - EPINEPHrine 1:914486 (XYLOCAINE W/EPI) 1 %-1:531557 injection 10 mL  10 mL Injection Once Gabriel Hernandez MD       • lidocaine 1% - EPINEPHrine 1:135985 (XYLOCAINE W/EPI) 1 %-1:215040 injection 10 mL  10 mL Injection Once Gabriel Hernandez MD         ADULT ILLNESSES:  Patient Active Problem List   Diagnosis Code   • Malignant neoplasm of upper-outer quadrant of right female breast C50.411   • Non-smoker Z78.9   • S/P lumpectomy, right breast Z98.890   • S/P lymph node biopsy Z98.890   • History of radiation therapy Z92.3   • Prophylactic use of letrozole (Femara) Z79.811   • Screening mammogram for high-risk patient Z12.31   • History of left breast cancer Z85.3   • Bilateral malignant neoplasm of upper outer quadrant of breast in female, unspecified estrogen receptor status C50.411, C50.412     SURGERIES:  Past Surgical History:   Procedure Laterality Date   • BREAST BIOPSY Right 09/2018    positive   • BREAST LUMPECTOMY Right 09/2018    lumpectomy with chemo and rad tx   • BREAST LUMPECTOMY WITH SENTINEL NODE BIOPSY Right 09/10/2018    Procedure: RIGHT BREAST LUMPECTOMY WITH SENTINEL LYMPH NODE BIOPSY, INJECTION AND SCAN;  Surgeon: Duglas Lawrence MD;  Location:   [facility] OR;  Service: General   • COLONOSCOPY N/A 06/06/2019    Procedure: COLONOSCOPY WITH ANESTHESIA;  Surgeon: Brock Raman MD;  Location:  [facility] ENDOSCOPY;  Service: Gastroenterology   • HYDROCELE EXCISION / REPAIR     • LASER ABLATION OF THE CERVIX     • MASTECTOMY WITH SENTINEL NODE BIOPSY AND AXILLARY NODE DISSECTION Bilateral 11/21/2023    Procedure: BREAST MASTECTOMY WITH left SENTINEL NODE BIOPSY AND AXILLARY NODE DISSECTION BILATERAL;  Surgeon: Duglas Lawrence MD;  Location:  [facility] OR;  Service: General;  Laterality: Bilateral;   • WRIST FUSION Left      HEALTH MAINTENANCE ITEMS:  Health Maintenance Due   Topic Date Due   • TDAP/TD VACCINES (1 - Tdap) Never done   • ZOSTER VACCINE (1 of 2) Never done   • HEPATITIS C SCREENING  Never done   • ANNUAL WELLNESS VISIT  Never done   • Pneumococcal Vaccine 65+ (1 of 1 - PCV) Never done   • INFLUENZA VACCINE  08/01/2023   • COVID-19 Vaccine (6 - 2023-24 season) 09/01/2023       <no information>  Last Completed Colonoscopy            COLORECTAL CANCER SCREENING (COLONOSCOPY - Every 10 Years) Next due on 6/6/2029 06/06/2019  Surgical Procedure: COLONOSCOPY    06/06/2019  COLONOSCOPY                  Immunization History   Administered Date(s) Administered   • COVID-19 (PFIZER) Purple Cap Monovalent 03/25/2021, 04/15/2021, 12/15/2021     Last Completed Mammogram       This patient has no relevant Health Maintenance data.              FAMILY HISTORY:  Family History   Problem Relation Age of Onset   • No Known Problems Mother    • Skin cancer Father    • No Known Problems Sister    • No Known Problems Brother    • No Known Problems Sister    • No Known Problems Brother    • No Known Problems Son    • No Known Problems Son    • No Known Problems Son    • No Known Problems Daughter    • No Known Problems Maternal Grandmother    • No Known Problems Paternal Grandmother    • No Known Problems Maternal Aunt    • No Known Problems Paternal Aunt    • No Known  "Problems Other    • Breast cancer Neg Hx    • Colon cancer Neg Hx    • Colon polyps Neg Hx    • BRCA 1/2 Neg Hx    • Endometrial cancer Neg Hx    • Ovarian cancer Neg Hx      SOCIAL HISTORY:  Social History     Socioeconomic History   • Marital status:    Tobacco Use   • Smoking status: Never     Passive exposure: Never   • Smokeless tobacco: Never   Vaping Use   • Vaping Use: Never used   Substance and Sexual Activity   • Alcohol use: Not Currently     Comment: Rare occasions in past socially   • Drug use: No   • Sexual activity: Yes     Partners: Male     Birth control/protection: Post-menopausal     Comment: Spouse only       REVIEW OF SYSTEMS:    Review of Systems   Constitutional:  Negative for fatigue and fever.        \"Feel good.\"   HENT:  Negative for congestion and trouble swallowing.    Eyes:  Negative for discharge and redness.   Respiratory:  Negative for shortness of breath and wheezing.    Cardiovascular:  Negative for chest pain and leg swelling.   Gastrointestinal:  Negative for abdominal pain, nausea and vomiting.   Genitourinary:  Negative for dysuria and flank pain.   Musculoskeletal:  Negative for gait problem and myalgias.   Skin:  Negative for pallor.   Allergic/Immunologic: Negative for food allergies.   Neurological:  Negative for dizziness and speech difficulty.   Hematological:  Negative for adenopathy. Does not bruise/bleed easily.   Psychiatric/Behavioral:  Negative for agitation, confusion and hallucinations.        VITAL SIGNS: /72   Pulse 94   Temp 97.4 °F (36.3 °C) (Tympanic)   Resp 18   Ht 168 cm (66.14\")   Wt 57.8 kg (127 lb 6.4 oz)   SpO2 96%   BMI 20.48 kg/m²  Lost 5 pounds.   Pain Score    01/25/24 0954   PainSc: 0-No pain       PHYSICAL EXAMINATION:     Physical Exam  Vitals reviewed.   Constitutional:       General: She is not in acute distress.  HENT:      Head: Normocephalic and atraumatic.   Eyes:      General: No scleral icterus.  Cardiovascular:      " Rate and Rhythm: Normal rate.   Pulmonary:      Effort: No respiratory distress.      Breath sounds: No wheezing.      Comments: Bilateral mastectomies. No local recurrence. Examined with Yessy.   Abdominal:      General: Bowel sounds are normal.      Palpations: Abdomen is soft.      Tenderness: There is no abdominal tenderness.   Musculoskeletal:         General: No swelling.      Cervical back: Neck supple.   Skin:     General: Skin is warm.      Coloration: Skin is not pale.   Neurological:      Mental Status: She is alert and oriented to person, place, and time.   Psychiatric:         Mood and Affect: Mood normal.         Behavior: Behavior normal.         Thought Content: Thought content normal.         Judgment: Judgment normal.         LABS    Lab Results - Last 18 Months   Lab Units 11/16/23  1207 10/12/23  0813 04/12/23  0807 10/06/22  0802   HEMOGLOBIN g/dL 12.7 13.7 14.1 14.0   HEMATOCRIT % 40.8 45.0 44.8 46.0   MCV fL 88.1 88.1 88.4 89.7   WBC 10*3/mm3 5.30 3.80 4.24 3.96   RDW % 13.3 13.2 13.2 13.1   MPV fL 9.9 9.8 9.6 10.0   PLATELETS 10*3/mm3 225 208 224 253   IMM GRAN % %  --  0.3 0.2 0.3   NEUTROS ABS 10*3/mm3  --  2.07 2.29 2.09   LYMPHS ABS 10*3/mm3  --  1.14 1.27 1.28   MONOS ABS 10*3/mm3  --  0.39 0.39 0.35   EOS ABS 10*3/mm3  --  0.14 0.22 0.19   BASOS ABS 10*3/mm3  --  0.05 0.06 0.04   IMMATURE GRANS (ABS) 10*3/mm3  --  0.01 0.01 0.01   NRBC /100 WBC  --  0.0 0.0 0.0       Lab Results - Last 18 Months   Lab Units 11/16/23  1207 10/12/23  0813 04/12/23  0807 10/06/22  0802   GLUCOSE mg/dL 108* 85 94 67   SODIUM mmol/L 143 142 142 144   POTASSIUM mmol/L 4.4 4.5 4.7 4.7   CO2 mmol/L 31.0* 29.0 28.0 32.0*   CHLORIDE mmol/L 106 104 105 103   ANION GAP mmol/L 6.0 9.0 9.0 9.0   CREATININE mg/dL 0.61 0.67 0.63 0.74   BUN mg/dL 15 12 13 13   BUN / CREAT RATIO  24.6 17.9 20.6 17.6   CALCIUM mg/dL 9.1 9.7 9.9 9.7   ALK PHOS U/L  --  75 80 79   TOTAL PROTEIN g/dL  --  7.1 7.2 7.5   ALT (SGPT) U/L  --   "10 11 13   AST (SGOT) U/L  --  17 18 16   BILIRUBIN mg/dL  --  0.6 0.7 0.6   ALBUMIN g/dL  --  4.5 4.6 4.40   GLOBULIN gm/dL  --  2.6 2.6 3.1       Lab Results - Last 18 Months   Lab Units 10/12/23  0813 04/12/23  0807 10/06/22  0802   CEA ng/mL 2.22 2.21 1.83       No results for input(s): \"IRON\", \"TIBC\", \"LABIRON\", \"FERRITIN\", \"V6OKMLR\", \"TSH\", \"FOLATE\" in the last 87853 hours.    Invalid input(s): \"VITB12\"    Adriana Mohan Arvind reports a pain score of 0.          ASSESSMENT:  1.   Left breast cancer, upper outer and upper lower quadrant, multifocal. Tumor size 1.7 cm followed by 10 mm and 8 mm.  No lymphovascular invasion.  Negative margins.  AJCC stage: 1a (pT1c, psnN0, cM0, G2).  Receptor status: Estrogen 95%, progesterone 84% and HER2/gregory negative by FISH.  Low Ki-67.  Treatment status:Patient had undergone bilateral mastectomy with sentinel lymph node resection from the left axilla on 11/21/2023.  2.   Performance status of 0.   3.   Self directed care.   4.   Osteopenia 11/3/2023.  She is on Caltrate D.  5.   Internal hemorrhoids.   6.   Elevated CEA, history of.    7.   Rash from Decadron.  8.   Grade 1 nausea from chemo, resolved.  9.   Grade 1 fatigue from chemo, resolved.   10.  Carcinoma of the right upper outer breast. Invasive ductal carcinoma:  Current Stage: AJCC IIA (pT1b, pN1a, M0, G2)   Tumor size 9 mm.  Hormone Status: %, MS 86%, and negative HER-2/gregory.  Baseline Node Status: 1/2 sentinel node positive for metastasis.   Complications of Tumor: None.   Treatment status: Post adjuvant docetaxel and cyclophosphamide.   Post adjuvant radiation 02/2019.   Declined adjuvant Femara.   Prognosis: Fair.  11.  Ductal carcinoma in situ, left, postmastectomy.           PLAN:  1.    Re: Mammogram 11/3/2023.  Suspicious microcalcifications upper outer left breast. Biopsy is recommended. BI-RADS 4. Stable postoperative changes of the right breast.  Sonogram guided biopsy 11/8/2023.  Sonography " "demonstrates a hypoechoic lesion with irregular margins in the upper outer quadrant of the left breast corresponding to the lesion noted on recent mammography. A total of 6 vacuum assisted core specimens were obtained the majority of which contain calcifications. A HydroMARK clip was left in place.  Also demonstrated was a 3 x 4 mm solid nodule with lobular margins approximate 1.2 cm medial to the larger lesion within the left breast. This was biopsied using a Monopty biopsy gun with a total of 6 core specimens obtained. This portion of the biopsy was somewhat difficult as the nodule was noted to well superiorly and inferiorly upon attempted firing of the instrument. There were at least 2 biopsies which I feel penetrated the central aspect of the lesion. A true michael X clip was left adjacent to this nodule.       Pathology report 11/13/2023.  Left breast, designated \"mass/distortion 2:00\", 4 cm from the nipple, biopsy:Invasive carcinoma of the breast, no special type, with areas of lobular pattern growth and the following features: Modified Graciela grade 2 (score 3 for tubules, score 2 for nuclear grade, score 1 for mitoses).Lymphovascular space invasion: Not identified.Ductal carcinoma in situ: Present, solid and cribriform growth patterns, intermediate nuclear grade with single cell necrosis and calcifications. Extent: Tumor involves 6 of 6 cores, largest span 14 mm. Additional findings: Calcifications associated with invasive carcinoma. Left breast, designated \"mass 2:00\", 3 cm from the nipple, biopsy:  Invasive carcinoma of the breast, no special type, with the following features: Modified Graciela grade 2 (score 3 for tubules, score 2 for nuclear grade, score 1 for mitoses).Lymphovascular space invasion: Not identified.Ductal carcinoma in situ: Not identified.Extent: 3 of 6 fragmented cores, largest span 2 mm.  Patient had undergone bilateral mastectomy with sentinel lymph node resection from the left " axilla on 11/21/2023.  Pathology report 11/22/2023.1.  Right breast, mastectomy: Nonneoplastic breast with prior surgical site identified.No residual/recurrent tumor identified.  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 Associated intermediate grade ductal carcinoma in situ, solid and cribriform type.Surgical resection margins free of invasive and in situ carcinoma.AJCC pathologic stage:  pT1c(m) N0(sn).  2.    Re:  Heme status.  None.  3.    Re:  Pre-office CMP.  None.  4.    Re:  Pre-office CEA none from 2.22.  5.    Re:  Pre-office CA27-29 none from 14.  6.    Re:  Role of Oncotype DX to determine role of adjuvant chemotherapy.  Plan for adjuvant chemotherapy if recurrence score at least 26 or greater.  7.   Refer to genetic counseling for genetic testing.  8.   Order Oncotype DX.  7.   eRx for letrozole 2.5 mg p.o. daily, 60, 3 refills if low recurrence score.  Monitor for hot flashes, weight gain, worsening bone loss and thrombosis.   8.   Continue ongoing management per primary care physician and other specialists.  9.    Plan of care discussed with patient.  Understanding expressed.  She is agreeable to proceed.  10.  Continue Caltrate D due to osteopenia.   11.  Return to the office in 1 month discussed Oncotype DX report, CBC with differential, CMP, CEA and CA 27-29, use right arm.   12.  Advance Care Planning  ACP discussion was held with the patient during this visit. Patient has an advance directive in EMR which is still valid.   13.  Further recommendations pending.              I have reviewed the assessment and plan and verified the accuracy of it. No changes to assessment and plan since the information was documented. Nahid Linton MD 01/25/24         I spent 44 total minutes, face-to-face, caring for Adriana today. Greater than 50% of this time involved counseling and/or coordination of care as documented within  this note.           cc: MD Duglas Jean Baptiste MD         (Jonathan Williamson MD)

## 2024-02-12 NOTE — PROGRESS NOTES
"[facility] HEMATOLOGY & ONCOLOGY  [address]  [place] [facility] [address] [phone]    Patient Name: Adriana Samuels  Encounter Date: 02/22/2024  YOB: 1955  Patient Number: [id number]      REASON FOR FOLLOW-UP:Ms. Adriana Samuels is a pleasant 68-year-old  female, post menopausal, who is seen on followup for Stage IIA right breast cancer, upper outer quadrant and lower outer quadrant.  She is seen 62.75 months from 4 cycles of adjuvant docetaxel and cyclophosphamide. She had previously declined adjuvant letrozole.  \"I will take it now.\"  She is seen alone.  History is considered reliable.         Oncology/Hematology History Overview Note   DIAGNOSTIC ABNORMALITIES:  Mammogram 11/3/2023.  Suspicious microcalcifications upper outer left breast. Biopsy is recommended. BI-RADS 4. Stable postoperative changes of the right breast.    Sonogram guided biopsy 11/8/2023.  Sonography demonstrates a hypoechoic lesion with irregular margins in the upper outer quadrant of the left breast corresponding to the lesion noted on recent mammography. A total of 6 vacuum assisted core specimens were obtained the majority of which contain calcifications. A HydroMARK clip was left in place.  Also demonstrated was a 3 x 4 mm solid nodule with lobular margins approximate 1.2 cm medial to the larger lesion within the left breast. This was biopsied using a Monopty biopsy gun with a total of 6 core specimens obtained. This portion of the biopsy was somewhat difficult as the nodule was noted to well superiorly and inferiorly upon attempted firing of the instrument. There were at least 2 biopsies which I feel penetrated the central aspect of the lesion. A true michael X clip was left adjacent to this nodule.  Pathology report 11/13/2023.  Left breast, designated \"mass/distortion 2:00\", 4 cm from the nipple, biopsy:Invasive carcinoma of the breast, no special " "type, with areas of lobular pattern growth and the following features: Modified Graciela grade 2 (score 3 for tubules, score 2 for nuclear grade, score 1 for mitoses).Lymphovascular space invasion: Not identified.Ductal carcinoma in situ: Present, solid and cribriform growth patterns, intermediate nuclear grade with single cell necrosis and calcifications. Extent: Tumor involves 6 of 6 cores, largest span 14 mm. Additional findings: Calcifications associated with invasive carcinoma. Left breast, designated \"mass 2:00\", 3 cm from the nipple, biopsy:  Invasive carcinoma of the breast, no special type, with the following features: Modified Graciela grade 2 (score 3 for tubules, score 2 for nuclear grade, score 1 for mitoses).Lymphovascular space invasion: Not identified.Ductal carcinoma in situ: Not identified.Extent: 3 of 6 fragmented cores, largest span 2 mm.    Pathology report 11/22/2023.1.  Right breast, mastectomy: Nonneoplastic breast with prior surgical site identified.No residual/recurrent tumor identified.  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 Associated intermediate grade ductal carcinoma in situ, solid and cribriform type.Surgical resection margins free of invasive and in situ carcinoma.AJCC pathologic stage:  pT1c(m) N0(sn).            PREVIOUS INTERVENTIONS:  Patient had undergone bilateral mastectomy with sentinel lymph node resection from the left axilla on 11/21/2023.          DIAGNOSTIC ABNORMALITIES:   The patient had a mammogram from [facility] that showed a lesion 5.6 mm at the 10 o'clock position also noted on ultrasound suspicious for malignancy. The patient was referred for nodule in the right upper outer breast.  The patient was seen by Dr. Lawrence 09/05/2018.  Examination showed small palpable abnormality at 10 and 11 o'clock positions, 0.5 cm to 1 cm in size. No adenopathy noted. Patient planned for excision and sentinel node " evaluation.  Pathology report 09/11/2018: Invasive ductal carcinoma intermediate grade, 1 out of 2 sentinel lymph nodes positive for metastasis. The largest metastatic focus is 2.5 mm. Negative for extracapsular extension. Tumor measures 9 mm in greatest linear dimension. Margins of resection are negative. Tumor measures 2 mm from closest anterior margin. AJCC T1b, N1a. ER/IN positive and HER-2/gregory negative.  CT abdomen and pelvis performed at [facility] on 09/21/2018 was revealing for IMPRESSION: 1. Post therapy changes right breast and axilla. 2. Linear nodularity left lung base, suspect chronic changes. 3. Otherwise, No CT evidence of definite metastatic disease in chest or acute cardiopulmonary process.   Bone scan performed at [facility] on 09/21/2018 was revealing for IMPRESSION: 1. Focal increased tracer activity identified in the left lateral mid-cervical spine, suspect degenerative changes. Plain radiographic imaging could further characterize. 2. Otherwise, No scintigraphic evidence of osseous metastases.   Ultrasound of liver performed at [facility] on 09/26/2018 was revealing for Summary:  Multiple hepatic cysts.      PREVIOUS INTERVENTIONS:   The patient had undergone right breast lumpectomy with sentinel lymph node biopsy 09/10/2018 by Dr. Lawrence.  Adjuvant Taxotere and Cytoxan 09/25/2018 through 11/27/2018 [facility]. 4 cycles.   Adjuvant radiation completed 02/2019 by Dr. Williamson.  Adjuvant Femara 2.5 mg po daily, she declined.            Malignant neoplasm of upper-outer quadrant of right female breast    Initial Diagnosis    Malignant neoplasm of right female breast (CMS/HCC)     9/10/2018 Surgery    Final Diagnosis   1.  Breast, right, lumpectomy and sentinel lymph node resection:  Invasive ductal carcinoma, intermediate grade  Tumor measures 9 mm in greatest linear dimension  Margins of resection are negative  Tumor measures 2 mm from the closest, anterior, margin.      Positive for metastatic ductal carcinoma in one out of 2 sentinel lymph nodes (1/2)  The largest metastatic focus is 2.5 mm  Negative for extra capsular extension     AJCC cancer stage: pT1b, (sn)pN1a            9/21/2018 Imaging    Ct Chest With Contrast  1. Post therapy changes right breast and axilla.   2. Linear nodularity left lung base, suspect chronic changes.   3. Otherwise, No CT evidence of definite metastatic disease in chest or acute cardiopulmonary process.     Nm Bone Scan Whole Body  1. Focal increased tracer activity identified in the left lateral mid cervical spine, suspect degenerative changes. Plain radiographic imaging could further characterize.   2. Otherwise, No scintigraphic evidence of osseous metastases.     Ct Abdomen Pelvis With Contrast  1. Multiple low-attenuation hepatic lesions, hepatic cyst most likely.   2. Suspect uterine fibroid   3. Otherwise, No CT evidence of metastatic disease or acute intra-abdominal/pelvic pathological process.      9/25/2018 - 11/27/2018 Chemotherapy    Taxotere Cytoxan     1/15/2019 - 2/25/2019 Radiation    Radiation OncologyTreatment Course:  Adriana Samuels received 6000 cGy in 30 fractions to right breast, right supraclavicular area, and right axilla via external beam radiation therapy.     10/28/2019 Imaging    Bilateral Diagnostic Mammogram:  IMPRESSION:  Posttherapeutic changes of the right breast are stable over  a one year time period and no suspicious findings are identified. The  patient is due to return in 12 months for bilateral digital mammograms.  BI-RADS Category 2, benign.     10/29/2020 Imaging    Bilateral Diagnostic Mammogram:  IMPRESSION:  1. No mammographic evidence of malignancy.  2. BI-RADS CATEGORY 2: Benign findings.  3. Recommend annual screening mammography.     Malignant neoplasm of upper-outer quadrant of left breast in female, estrogen receptor positive   1/25/2024 Initial Diagnosis    Malignant neoplasm of upper-outer  quadrant of left breast in female, estrogen receptor positive     1/25/2024 Cancer Staged    Staging form: Breast, AJCC 8th Edition  - Pathologic stage from 1/25/2024: Stage IA (pT1c, pN0, cM0, G2, ER+, CT+, HER2-) - Signed by Nahid Linton MD on 1/25/2024         PAST MEDICAL HISTORY:  ALLERGIES:  Allergies   Allergen Reactions    Bactrim [Sulfamethoxazole-Trimethoprim] Rash    Cortisone Rash    Decadron [Dexamethasone] Hives    Hydrocortisone Hives     Oral or IV    Clindamycin/Lincomycin Hives    Erythromycin Nausea Only and Rash     CURRENT MEDICATIONS:  Outpatient Encounter Medications as of 2/22/2024   Medication Sig Dispense Refill    aspirin 81 MG chewable tablet Chew 1 tablet Daily.      Biotin 1 MG capsule Take 1 capsule by mouth Daily.      Calcium Carb-Cholecalciferol (CALCIUM 1000 + D PO) Take  by mouth Daily.      glucosamine sulfate 500 MG capsule capsule Take  by mouth 3 (Three) Times a Day With Meals.      Multiple Vitamins-Minerals (MULTIVITAMIN WITH MINERALS) tablet tablet Take 1 tablet by mouth Daily.      Probiotic Product (PROBIOTIC PO) Take 1 capsule by mouth Daily.       Facility-Administered Encounter Medications as of 2/22/2024   Medication Dose Route Frequency Provider Last Rate Last Admin    lidocaine (XYLOCAINE) 1 % injection 10 mL  10 mL Subcutaneous Once HalfhillGabriel MD        lidocaine (XYLOCAINE) 1 % injection 10 mL  10 mL Subcutaneous Once HalfhillGabriel MD        lidocaine 1% - EPINEPHrine 1:239525 (XYLOCAINE W/EPI) 1 %-1:613841 injection 10 mL  10 mL Injection Once HalfhiGabriel pierre MD        lidocaine 1% - EPINEPHrine 1:319525 (XYLOCAINE W/EPI) 1 %-1:873615 injection 10 mL  10 mL Injection Once HalfhiGabriel pierre MD         ADULT ILLNESSES:  Patient Active Problem List   Diagnosis Code    Malignant neoplasm of upper-outer quadrant of right female breast C50.411    Non-smoker Z78.9    S/P lumpectomy, right breast Z98.890    S/P lymph node biopsy Z98.890     History of radiation therapy Z92.3    Prophylactic use of letrozole (Femara) Z79.811    Screening mammogram for high-risk patient Z12.31    History of left breast cancer Z85.3    Malignant neoplasm of upper-outer quadrant of left breast in female, estrogen receptor positive C50.412, Z17.0     SURGERIES:  Past Surgical History:   Procedure Laterality Date    BREAST BIOPSY Right 09/2018    positive    BREAST LUMPECTOMY Right 09/2018    lumpectomy with chemo and rad tx    BREAST LUMPECTOMY WITH SENTINEL NODE BIOPSY Right 09/10/2018    Procedure: RIGHT BREAST LUMPECTOMY WITH SENTINEL LYMPH NODE BIOPSY, INJECTION AND SCAN;  Surgeon: Duglas Lawrence MD;  Location: [facility] OR;  Service: General    COLONOSCOPY N/A 06/06/2019    Procedure: COLONOSCOPY WITH ANESTHESIA;  Surgeon: Brock Raman MD;  Location: [facility] ENDOSCOPY;  Service: Gastroenterology    HYDROCELE EXCISION / REPAIR      LASER ABLATION OF THE CERVIX      MASTECTOMY WITH SENTINEL NODE BIOPSY AND AXILLARY NODE DISSECTION Bilateral 11/21/2023    Procedure: BREAST MASTECTOMY WITH left SENTINEL NODE BIOPSY AND AXILLARY NODE DISSECTION BILATERAL;  Surgeon: Duglas Lawrence MD;  Location: [facility] OR;  Service: General;  Laterality: Bilateral;    WRIST FUSION Left      HEALTH MAINTENANCE ITEMS:  Health Maintenance Due   Topic Date Due    TDAP/TD VACCINES (1 - Tdap) Never done    ZOSTER VACCINE (1 of 2) Never done    RSV Vaccine - Adults (1 - 1-dose 60+ series) Never done    HEPATITIS C SCREENING  Never done    ANNUAL WELLNESS VISIT  Never done    Pneumococcal Vaccine 65+ (1 of 1 - PCV) Never done    INFLUENZA VACCINE  08/01/2023    COVID-19 Vaccine (6 - 2023-24 season) 09/01/2023       <no information>  Last Completed Colonoscopy            COLORECTAL CANCER SCREENING (COLONOSCOPY - Every 10 Years) Next due on 6/6/2029 06/06/2019  Surgical Procedure: COLONOSCOPY    06/06/2019  COLONOSCOPY                  Immunization History   Administered Date(s)  "Administered    COVID-19 (PFIZER) Purple Cap Monovalent 03/25/2021, 04/15/2021, 12/15/2021     Last Completed Mammogram       This patient has no relevant Health Maintenance data.              FAMILY HISTORY:  Family History   Problem Relation Age of Onset    No Known Problems Mother     Skin cancer Father     No Known Problems Sister     No Known Problems Brother     No Known Problems Sister     No Known Problems Brother     No Known Problems Son     No Known Problems Son     No Known Problems Son     No Known Problems Daughter     No Known Problems Maternal Grandmother     No Known Problems Paternal Grandmother     No Known Problems Maternal Aunt     No Known Problems Paternal Aunt     No Known Problems Other     Breast cancer Neg Hx     Colon cancer Neg Hx     Colon polyps Neg Hx     BRCA 1/2 Neg Hx     Endometrial cancer Neg Hx     Ovarian cancer Neg Hx      SOCIAL HISTORY:  Social History     Socioeconomic History    Marital status:    Tobacco Use    Smoking status: Never     Passive exposure: Never    Smokeless tobacco: Never   Vaping Use    Vaping Use: Never used   Substance and Sexual Activity    Alcohol use: Not Currently     Comment: Rare occasions in past socially    Drug use: No    Sexual activity: Yes     Partners: Male     Birth control/protection: Post-menopausal     Comment: Spouse only       REVIEW OF SYSTEMS:    Review of Systems   Constitutional:  Negative for fatigue, fever and unexpected weight change.        \"I feel fine.\"   HENT:  Negative for facial swelling and trouble swallowing.    Eyes:  Negative for discharge and redness.   Respiratory:  Negative for shortness of breath and wheezing.    Cardiovascular:  Negative for chest pain and leg swelling.   Gastrointestinal:  Negative for abdominal pain, nausea and vomiting.   Endocrine: Negative for polydipsia and polyphagia.   Genitourinary:  Negative for difficulty urinating and dysuria.   Musculoskeletal:  Negative for gait problem and " "myalgias.   Skin:  Negative for pallor.   Allergic/Immunologic: Negative for food allergies.   Neurological:  Negative for dizziness and speech difficulty.   Hematological:  Negative for adenopathy. Does not bruise/bleed easily.   Psychiatric/Behavioral:  Negative for agitation, confusion and hallucinations.        VITAL SIGNS: /62   Pulse 86   Temp 97.8 °F (36.6 °C)   Resp 18   Ht 168 cm (66.14\")   Wt 57.2 kg (126 lb)   SpO2 99%   Breastfeeding No   BMI 20.25 kg/m²   Pain Score    02/22/24 0848   PainSc: 0-No pain       PHYSICAL EXAMINATION:     Physical Exam  Vitals reviewed.   Constitutional:       General: She is not in acute distress.  HENT:      Head: Normocephalic and atraumatic.   Eyes:      General: No scleral icterus.  Cardiovascular:      Rate and Rhythm: Normal rate.   Pulmonary:      Effort: No respiratory distress.      Breath sounds: No wheezing.   Abdominal:      General: Bowel sounds are normal. There is no distension.      Palpations: Abdomen is soft.   Musculoskeletal:         General: No swelling.      Cervical back: Neck supple.   Skin:     General: Skin is warm.      Coloration: Skin is not pale.   Neurological:      Mental Status: She is alert and oriented to person, place, and time.   Psychiatric:         Mood and Affect: Mood normal.         Behavior: Behavior normal.         Thought Content: Thought content normal.         Judgment: Judgment normal.         LABS    Lab Results - Last 18 Months   Lab Units 02/22/24  0759 11/16/23  1207 10/12/23  0813 04/12/23  0807 10/06/22  0802   HEMOGLOBIN g/dL 13.7 12.7 13.7 14.1 14.0   HEMATOCRIT % 43.4 40.8 45.0 44.8 46.0   MCV fL 87.0 88.1 88.1 88.4 89.7   WBC 10*3/mm3 3.71 5.30 3.80 4.24 3.96   RDW % 12.9 13.3 13.2 13.2 13.1   MPV fL 9.8 9.9 9.8 9.6 10.0   PLATELETS 10*3/mm3 212 225 208 224 253   IMM GRAN % % 0.0  --  0.3 0.2 0.3   NEUTROS ABS 10*3/mm3 1.99  --  2.07 2.29 2.09   LYMPHS ABS 10*3/mm3 1.07  --  1.14 1.27 1.28   MONOS ABS " "10*3/mm3 0.41  --  0.39 0.39 0.35   EOS ABS 10*3/mm3 0.19  --  0.14 0.22 0.19   BASOS ABS 10*3/mm3 0.05  --  0.05 0.06 0.04   IMMATURE GRANS (ABS) 10*3/mm3 0.00  --  0.01 0.01 0.01   NRBC /100 WBC 0.0  --  0.0 0.0 0.0       Lab Results - Last 18 Months   Lab Units 02/22/24  0759 11/16/23  1207 10/12/23  0813 04/12/23  0807 10/06/22  0802   GLUCOSE mg/dL 88 108* 85 94 67   SODIUM mmol/L 143 143 142 142 144   POTASSIUM mmol/L 4.3 4.4 4.5 4.7 4.7   CO2 mmol/L 32.0* 31.0* 29.0 28.0 32.0*   CHLORIDE mmol/L 104 106 104 105 103   ANION GAP mmol/L 7.0 6.0 9.0 9.0 9.0   CREATININE mg/dL 0.67 0.61 0.67 0.63 0.74   BUN mg/dL 14 15 12 13 13   BUN / CREAT RATIO  20.9 24.6 17.9 20.6 17.6   CALCIUM mg/dL 9.6 9.1 9.7 9.9 9.7   ALK PHOS U/L 78  --  75 80 79   TOTAL PROTEIN g/dL 7.2  --  7.1 7.2 7.5   ALT (SGPT) U/L 9  --  10 11 13   AST (SGOT) U/L 16  --  17 18 16   BILIRUBIN mg/dL 0.6  --  0.6 0.7 0.6   ALBUMIN g/dL 4.5  --  4.5 4.6 4.40   GLOBULIN gm/dL 2.7  --  2.6 2.6 3.1       Lab Results - Last 18 Months   Lab Units 10/12/23  0813 04/12/23  0807 10/06/22  0802   CEA ng/mL 2.22 2.21 1.83       No results for input(s): \"IRON\", \"TIBC\", \"LABIRON\", \"FERRITIN\", \"T7LXMKI\", \"TSH\", \"FOLATE\" in the last 83539 hours.    Invalid input(s): \"VITB12\"    Adriana Mohan Arvind reports a pain score of 0.        ASSESSMENT:  1.   Left breast cancer, upper outer quadrant and upper lower quadrant, multifocal. Tumor size 1.7 cm followed by 10 mm and 8 mm.  No lymphovascular invasion.  Negative margins.  AJCC stage: 1a (pT1c, psnN0, cM0, G2).  Receptor status: Estrogen 95%, progesterone 84% and HER2/gregory negative by FISH.  Low Ki-67.  Treatment status:Patient had undergone bilateral mastectomy with sentinel lymph node resection from the left axilla on 11/21/2023.  Oncotype DX score pending.  2.   Performance status of 0.   3.   Self directed care.   4.   Osteopenia 11/3/2023.  Patient on Caltrate D.  5.   Internal hemorrhoids.   6.   Elevated CEA, " "history of.    7.   Rash from Decadron.  8.   Grade 1 nausea from chemo, resolved.  9.   Grade 1 fatigue from chemo, resolved.   10.  Carcinoma of the right upper outer breast. Invasive ductal carcinoma:  Stage: AJCC IIA (pT1b, pN1a, M0, G2)   Tumor size 9 mm.  Hormone Status: %, RI 86%, and negative HER-2/gregory.  Baseline Node Status: 1/2 sentinel node positive for metastasis.   Complications of Tumor: None.   Treatment status: Post adjuvant docetaxel and cyclophosphamide.   Post adjuvant radiation 02/2019.   Declined adjuvant Femara.   Prognosis: Fair.  11.  Ductal carcinoma in situ, left, postmastectomy.             PLAN:  1.    Re:  Oncotype Dx report pending.       2.    Re:  Heme status. WBC 3.7, hemoglobin 13.7 and platelet 212.  3.    Re:  Pre-office CMP.  GFR 94.7 ml/min and CO2 at 32 from 31.  4.    Re:  Pre-office CEA pending from 2.22.  5.    Re:  Pre-office CA27-29 pending from 14.  6.    Re:  Anticipate adjuvant letrozole if low recurrence score.   7.   Refer to genetic counseling for genetic testing. \"They called me and insurance does not cover it.\"  8.   eRx for letrozole 2.5 mg p.o. daily, 60, 3 refills if low recurrence score. Observe for weight gain, hot flashes, worsening bone loss and thrombosis.   9.   Continue ongoing management per primary care physician and other specialists.  10.  Plan of care discussed with patient.  Understanding expressed.  Patient is agreeable to proceed.  11.  Continue Caltrate D secondary to osteopenia.  12.  Next bone density 11/2025.   13.  No further mammograms, post bilateral mastectomy.   14.  Return to the office in 3 months with CBC with differential, CMP, CEA and CA 27-29, use right arm, same day.   15.  Advance Care Planning  ACP discussion was held with the patient during this visit. Patient has an advance directive in EMR which is still valid.           I have reviewed the assessment and plan and verified the " accuracy of it. No changes to assessment and plan since the information was documented. Nahid Linton MD 02/22/24            I spent 41 total minutes, face-to-face, caring for Adriana today. Greater than 50% of this time involved counseling and/or coordination of care as documented within this note.          cc: Arie Renteria MD        (Duglas Lawrence MD)         (Jonathan Williamson MD)

## 2024-02-22 ENCOUNTER — TELEPHONE (OUTPATIENT)
Dept: ONCOLOGY | Facility: CLINIC | Age: 69
End: 2024-02-22

## 2024-02-22 ENCOUNTER — OFFICE VISIT (OUTPATIENT)
Dept: ONCOLOGY | Facility: CLINIC | Age: 69
End: 2024-02-22
Payer: MEDICARE

## 2024-02-22 ENCOUNTER — LAB (OUTPATIENT)
Dept: LAB | Facility: HOSPITAL | Age: 69
End: 2024-02-22
Payer: MEDICARE

## 2024-02-22 VITALS
HEART RATE: 86 BPM | OXYGEN SATURATION: 99 % | SYSTOLIC BLOOD PRESSURE: 124 MMHG | HEIGHT: 66 IN | BODY MASS INDEX: 20.25 KG/M2 | DIASTOLIC BLOOD PRESSURE: 62 MMHG | RESPIRATION RATE: 18 BRPM | TEMPERATURE: 97.8 F | WEIGHT: 126 LBS

## 2024-02-22 DIAGNOSIS — C50.412 MALIGNANT NEOPLASM OF UPPER-OUTER QUADRANT OF LEFT BREAST IN FEMALE, ESTROGEN RECEPTOR POSITIVE: Primary | ICD-10-CM

## 2024-02-22 DIAGNOSIS — Z17.0 MALIGNANT NEOPLASM OF UPPER-OUTER QUADRANT OF LEFT BREAST IN FEMALE, ESTROGEN RECEPTOR POSITIVE: Primary | ICD-10-CM

## 2024-02-22 DIAGNOSIS — Z17.0 MALIGNANT NEOPLASM OF UPPER-OUTER QUADRANT OF RIGHT BREAST IN FEMALE, ESTROGEN RECEPTOR POSITIVE: ICD-10-CM

## 2024-02-22 DIAGNOSIS — C50.411 MALIGNANT NEOPLASM OF UPPER-OUTER QUADRANT OF RIGHT BREAST IN FEMALE, ESTROGEN RECEPTOR POSITIVE: ICD-10-CM

## 2024-02-22 LAB
ALBUMIN SERPL-MCNC: 4.5 G/DL (ref 3.5–5.2)
ALBUMIN/GLOB SERPL: 1.7 G/DL
ALP SERPL-CCNC: 78 U/L (ref 39–117)
ALT SERPL W P-5'-P-CCNC: 9 U/L (ref 1–33)
ANION GAP SERPL CALCULATED.3IONS-SCNC: 7 MMOL/L (ref 5–15)
AST SERPL-CCNC: 16 U/L (ref 1–32)
BASOPHILS # BLD AUTO: 0.05 10*3/MM3 (ref 0–0.2)
BASOPHILS NFR BLD AUTO: 1.3 % (ref 0–1.5)
BILIRUB SERPL-MCNC: 0.6 MG/DL (ref 0–1.2)
BUN SERPL-MCNC: 14 MG/DL (ref 8–23)
BUN/CREAT SERPL: 20.9 (ref 7–25)
CALCIUM SPEC-SCNC: 9.6 MG/DL (ref 8.6–10.5)
CEA SERPL-MCNC: 2.03 NG/ML
CHLORIDE SERPL-SCNC: 104 MMOL/L (ref 98–107)
CO2 SERPL-SCNC: 32 MMOL/L (ref 22–29)
CREAT SERPL-MCNC: 0.67 MG/DL (ref 0.57–1)
DEPRECATED RDW RBC AUTO: 40.8 FL (ref 37–54)
EGFRCR SERPLBLD CKD-EPI 2021: 94.7 ML/MIN/1.73
EOSINOPHIL # BLD AUTO: 0.19 10*3/MM3 (ref 0–0.4)
EOSINOPHIL NFR BLD AUTO: 5.1 % (ref 0.3–6.2)
ERYTHROCYTE [DISTWIDTH] IN BLOOD BY AUTOMATED COUNT: 12.9 % (ref 12.3–15.4)
GLOBULIN UR ELPH-MCNC: 2.7 GM/DL
GLUCOSE SERPL-MCNC: 88 MG/DL (ref 65–99)
HCT VFR BLD AUTO: 43.4 % (ref 34–46.6)
HGB BLD-MCNC: 13.7 G/DL (ref 12–15.9)
IMM GRANULOCYTES # BLD AUTO: 0 10*3/MM3 (ref 0–0.05)
IMM GRANULOCYTES NFR BLD AUTO: 0 % (ref 0–0.5)
LYMPHOCYTES # BLD AUTO: 1.07 10*3/MM3 (ref 0.7–3.1)
LYMPHOCYTES NFR BLD AUTO: 28.8 % (ref 19.6–45.3)
MCH RBC QN AUTO: 27.5 PG (ref 26.6–33)
MCHC RBC AUTO-ENTMCNC: 31.6 G/DL (ref 31.5–35.7)
MCV RBC AUTO: 87 FL (ref 79–97)
MONOCYTES # BLD AUTO: 0.41 10*3/MM3 (ref 0.1–0.9)
MONOCYTES NFR BLD AUTO: 11.1 % (ref 5–12)
NEUTROPHILS NFR BLD AUTO: 1.99 10*3/MM3 (ref 1.7–7)
NEUTROPHILS NFR BLD AUTO: 53.7 % (ref 42.7–76)
NRBC BLD AUTO-RTO: 0 /100 WBC (ref 0–0.2)
PLATELET # BLD AUTO: 212 10*3/MM3 (ref 140–450)
PMV BLD AUTO: 9.8 FL (ref 6–12)
POTASSIUM SERPL-SCNC: 4.3 MMOL/L (ref 3.5–5.2)
PROT SERPL-MCNC: 7.2 G/DL (ref 6–8.5)
RBC # BLD AUTO: 4.99 10*6/MM3 (ref 3.77–5.28)
SODIUM SERPL-SCNC: 143 MMOL/L (ref 136–145)
WBC NRBC COR # BLD AUTO: 3.71 10*3/MM3 (ref 3.4–10.8)

## 2024-02-22 PROCEDURE — 80053 COMPREHEN METABOLIC PANEL: CPT

## 2024-02-22 PROCEDURE — 86300 IMMUNOASSAY TUMOR CA 15-3: CPT

## 2024-02-22 PROCEDURE — 36415 COLL VENOUS BLD VENIPUNCTURE: CPT

## 2024-02-22 PROCEDURE — 85025 COMPLETE CBC W/AUTO DIFF WBC: CPT

## 2024-02-22 PROCEDURE — 82378 CARCINOEMBRYONIC ANTIGEN: CPT

## 2024-02-22 NOTE — TELEPHONE ENCOUNTER
Returned call to [facility], clarification given.  Records have all been faxed previously and faxed again.

## 2024-02-23 LAB — CANCER AG27-29 SERPL-ACNC: 13.8 U/ML (ref 0–38.6)

## 2024-02-29 LAB
CYTO UR: NORMAL
LAB AP CASE REPORT: NORMAL
LAB AP SYNOPTIC CHECKLIST: NORMAL
Lab: NORMAL
PATH REPORT.ADDENDUM SPEC: NORMAL
PATH REPORT.FINAL DX SPEC: NORMAL
PATH REPORT.GROSS SPEC: NORMAL

## 2024-02-29 RX ORDER — LETROZOLE 2.5 MG/1
2.5 TABLET, FILM COATED ORAL DAILY
Qty: 90 TABLET | Refills: 3 | Status: SHIPPED | OUTPATIENT
Start: 2024-02-29

## 2024-02-29 NOTE — TELEPHONE ENCOUNTER
----- Message from Nahid Linton MD sent at 2/29/2024 10:51 AM CST -----  Recurrence score 2.  Less than 1% benefit for chemo.  3% distant recurrence risk at 9 years.

## 2024-03-06 LAB
CYTO UR: NORMAL
LAB AP CASE REPORT: NORMAL
LAB AP SYNOPTIC CHECKLIST: NORMAL
Lab: NORMAL
Lab: NORMAL
PATH REPORT.ADDENDUM SPEC: NORMAL
PATH REPORT.FINAL DX SPEC: NORMAL
PATH REPORT.GROSS SPEC: NORMAL

## 2024-05-09 NOTE — PROGRESS NOTES
"[facility] HEMATOLOGY & ONCOLOGY  [address]  [facility] [address] [phone]    Patient Name: Adriana Samuels  Encounter Date: 05/23/2024  YOB: 1955  Patient Number: [id number]      REASON FOR FOLLOW-UP: Ms. Adriana Samuels is a pleasant 68-year-old  female, post menopausal, who is seen on followup for Stage IIA right breast carcinoma, upper outer quadrant and lower outer quadrant, receptor positive.  She is seen 65.75 months from 4 cycles of adjuvant docetaxel and cyclophosphamide.  She agreed to initiate adjuvant letrozole on 3/6/2024 through present.   She is seen alone.  She is a reliable historian.        Oncology/Hematology History Overview Note   DIAGNOSTIC ABNORMALITIES:  Mammogram 11/3/2023.  Suspicious microcalcifications upper outer left breast. Biopsy is recommended. BI-RADS 4. Stable postoperative changes of the right breast.    Sonogram guided biopsy 11/8/2023.  Sonography demonstrates a hypoechoic lesion with irregular margins in the upper outer quadrant of the left breast corresponding to the lesion noted on recent mammography. A total of 6 vacuum assisted core specimens were obtained the majority of which contain calcifications. A HydroMARK clip was left in place.  Also demonstrated was a 3 x 4 mm solid nodule with lobular margins approximate 1.2 cm medial to the larger lesion within the left breast. This was biopsied using a Monopty biopsy gun with a total of 6 core specimens obtained. This portion of the biopsy was somewhat difficult as the nodule was noted to well superiorly and inferiorly upon attempted firing of the instrument. There were at least 2 biopsies which I feel penetrated the central aspect of the lesion. A true michael X clip was left adjacent to this nodule.  Pathology report 11/13/2023.  Left breast, designated \"mass/distortion 2:00\", 4 cm from the nipple, biopsy:Invasive carcinoma of the " "breast, no special type, with areas of lobular pattern growth and the following features: Modified [place] grade 2 (score 3 for tubules, score 2 for nuclear grade, score 1 for mitoses).Lymphovascular space invasion: Not identified.Ductal carcinoma in situ: Present, solid and cribriform growth patterns, intermediate nuclear grade with single cell necrosis and calcifications. Extent: Tumor involves 6 of 6 cores, largest span 14 mm. Additional findings: Calcifications associated with invasive carcinoma. Left breast, designated \"mass 2:00\", 3 cm from the nipple, biopsy:  Invasive carcinoma of the breast, no special type, with the following features: Modified Graciela grade 2 (score 3 for tubules, score 2 for nuclear grade, score 1 for mitoses).Lymphovascular space invasion: Not identified.Ductal carcinoma in situ: Not identified.Extent: 3 of 6 fragmented cores, largest span 2 mm.    Pathology report 11/22/2023.1.  Right breast, mastectomy: Nonneoplastic breast with prior surgical site identified.No residual/recurrent tumor identified.  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 Associated intermediate grade ductal carcinoma in situ, solid and cribriform type.Surgical resection margins free of invasive and in situ carcinoma.AJCC pathologic stage:  pT1c(m) N0(sn).            PREVIOUS INTERVENTIONS:  Patient had undergone bilateral mastectomy with sentinel lymph node resection from the left axilla on 11/21/2023.  Adjuvant letrozole on 3/6/2024 through present.          DIAGNOSTIC ABNORMALITIES:   The patient had a mammogram from [facility] that showed a lesion 5.6 mm at the 10 o'clock position also noted on ultrasound suspicious for malignancy. The patient was referred for nodule in the right upper outer breast.  The patient was seen by Dr. Lawrence 09/05/2018.  Examination showed small palpable abnormality at 10 and 11 o'clock positions, 0.5 cm to 1 cm in size. " No adenopathy noted. Patient planned for excision and sentinel node evaluation.  Pathology report 09/11/2018: Invasive ductal carcinoma intermediate grade, 1 out of 2 sentinel lymph nodes positive for metastasis. The largest metastatic focus is 2.5 mm. Negative for extracapsular extension. Tumor measures 9 mm in greatest linear dimension. Margins of resection are negative. Tumor measures 2 mm from closest anterior margin. AJCC T1b, N1a. ER/HI positive and HER-2/gregory negative.  CT abdomen and pelvis performed at [facility] on 09/21/2018 was revealing for IMPRESSION: 1. Post therapy changes right breast and axilla. 2. Linear nodularity left lung base, suspect chronic changes. 3. Otherwise, No CT evidence of definite metastatic disease in chest or acute cardiopulmonary process.   Bone scan performed at [facility] on 09/21/2018 was revealing for IMPRESSION: 1. Focal increased tracer activity identified in the left lateral mid-cervical spine, suspect degenerative changes. Plain radiographic imaging could further characterize. 2. Otherwise, No scintigraphic evidence of osseous metastases.   Ultrasound of liver performed at [facility] on 09/26/2018 was revealing for Summary:  Multiple hepatic cysts.      PREVIOUS INTERVENTIONS:   The patient had undergone right breast lumpectomy with sentinel lymph node biopsy 09/10/2018 by Dr. Lawrence.  Adjuvant Taxotere and Cytoxan 09/25/2018 through 11/27/2018 at the [facility]. 4 cycles.   Adjuvant radiation completed 02/2019 by Dr. Williamson.  Adjuvant Femara 2.5 mg po daily, she declined.            Malignant neoplasm of upper-outer quadrant of right female breast    Initial Diagnosis    Malignant neoplasm of right female breast (CMS/HCC)     9/10/2018 Surgery    Final Diagnosis   1.  Breast, right, lumpectomy and sentinel lymph node resection:  Invasive ductal carcinoma, intermediate grade  Tumor measures 9 mm in greatest linear dimension  Margins of resection are  negative  Tumor measures 2 mm from the closest, anterior, margin.     Positive for metastatic ductal carcinoma in one out of 2 sentinel lymph nodes (1/2)  The largest metastatic focus is 2.5 mm  Negative for extra capsular extension     AJCC cancer stage: pT1b, (sn)pN1a            9/21/2018 Imaging    Ct Chest With Contrast  1. Post therapy changes right breast and axilla.   2. Linear nodularity left lung base, suspect chronic changes.   3. Otherwise, No CT evidence of definite metastatic disease in chest or acute cardiopulmonary process.     Nm Bone Scan Whole Body  1. Focal increased tracer activity identified in the left lateral mid cervical spine, suspect degenerative changes. Plain radiographic imaging could further characterize.   2. Otherwise, No scintigraphic evidence of osseous metastases.     Ct Abdomen Pelvis With Contrast  1. Multiple low-attenuation hepatic lesions, hepatic cyst most likely.   2. Suspect uterine fibroid   3. Otherwise, No CT evidence of metastatic disease or acute intra-abdominal/pelvic pathological process.      9/25/2018 - 11/27/2018 Chemotherapy    Taxotere Cytoxan     1/15/2019 - 2/25/2019 Radiation    Radiation OncologyTreatment Course:  Adriana Samuels received 6000 cGy in 30 fractions to right breast, right supraclavicular area, and right axilla via external beam radiation therapy.     10/28/2019 Imaging    Bilateral Diagnostic Mammogram:  IMPRESSION:  Posttherapeutic changes of the right breast are stable over  a one year time period and no suspicious findings are identified. The  patient is due to return in 12 months for bilateral digital mammograms.  BI-RADS Category 2, benign.     10/29/2020 Imaging    Bilateral Diagnostic Mammogram:  IMPRESSION:  1. No mammographic evidence of malignancy.  2. BI-RADS CATEGORY 2: Benign findings.  3. Recommend annual screening mammography.     Malignant neoplasm of upper-outer quadrant of left breast in female, estrogen receptor positive    1/25/2024 Initial Diagnosis    Malignant neoplasm of upper-outer quadrant of left breast in female, estrogen receptor positive     1/25/2024 Cancer Staged    Staging form: Breast, AJCC 8th Edition  - Pathologic stage from 1/25/2024: Stage IA (pT1c, pN0, cM0, G2, ER+, PA+, HER2-) - Signed by Nahid Linton MD on 1/25/2024         PAST MEDICAL HISTORY:  ALLERGIES:  Allergies   Allergen Reactions    Bactrim [Sulfamethoxazole-Trimethoprim] Rash    Cortisone Rash    Decadron [Dexamethasone] Hives    Hydrocortisone Hives     Oral or IV    Clindamycin/Lincomycin Hives    Erythromycin Nausea Only and Rash     CURRENT MEDICATIONS:  Outpatient Encounter Medications as of 5/23/2024   Medication Sig Dispense Refill    aspirin 81 MG chewable tablet Chew 1 tablet Daily.      Biotin 1 MG capsule Take 1 capsule by mouth Daily.      Calcium Carb-Cholecalciferol (CALCIUM 1000 + D PO) Take  by mouth Daily.      glucosamine sulfate 500 MG capsule capsule Take  by mouth 3 (Three) Times a Day With Meals.      letrozole (FEMARA) 2.5 MG tablet Take 1 tablet by mouth Daily. 90 tablet 3    Multiple Vitamins-Minerals (MULTIVITAMIN WITH MINERALS) tablet tablet Take 1 tablet by mouth Daily.      Probiotic Product (PROBIOTIC PO) Take 1 capsule by mouth Daily.       Facility-Administered Encounter Medications as of 5/23/2024   Medication Dose Route Frequency Provider Last Rate Last Admin    lidocaine (XYLOCAINE) 1 % injection 10 mL  10 mL Subcutaneous Once HalfhiGabriel pierre MD        lidocaine (XYLOCAINE) 1 % injection 10 mL  10 mL Subcutaneous Once HalfhiGabriel pierre MD        lidocaine 1% - EPINEPHrine 1:272767 (XYLOCAINE W/EPI) 1 %-1:225938 injection 10 mL  10 mL Injection Once HalfhiGabriel pierre MD        lidocaine 1% - EPINEPHrine 1:062172 (XYLOCAINE W/EPI) 1 %-1:001358 injection 10 mL  10 mL Injection Once HalfhiGabriel pierre MD         ADULT ILLNESSES:  Patient Active Problem List   Diagnosis Code    Malignant neoplasm of  upper-outer quadrant of right female breast C50.411    Non-smoker Z78.9    S/P lumpectomy, right breast Z98.890    S/P lymph node biopsy Z98.890    History of radiation therapy Z92.3    Prophylactic use of letrozole (Femara) Z79.811    Screening mammogram for high-risk patient Z12.31    History of left breast cancer Z85.3    Malignant neoplasm of upper-outer quadrant of left breast in female, estrogen receptor positive C50.412, Z17.0     SURGERIES:  Past Surgical History:   Procedure Laterality Date    BREAST BIOPSY Right 09/2018    positive    BREAST LUMPECTOMY Right 09/2018    lumpectomy with chemo and rad tx    BREAST LUMPECTOMY WITH SENTINEL NODE BIOPSY Right 09/10/2018    Procedure: RIGHT BREAST LUMPECTOMY WITH SENTINEL LYMPH NODE BIOPSY, INJECTION AND SCAN;  Surgeon: Duglas Lawrence MD;  Location: [facility] OR;  Service: General    COLONOSCOPY N/A 06/06/2019    Procedure: COLONOSCOPY WITH ANESTHESIA;  Surgeon: Brock Raman MD;  Location:  [facility] ENDOSCOPY;  Service: Gastroenterology    HYDROCELE EXCISION / REPAIR      LASER ABLATION OF THE CERVIX      MASTECTOMY WITH SENTINEL NODE BIOPSY AND AXILLARY NODE DISSECTION Bilateral 11/21/2023    Procedure: BREAST MASTECTOMY WITH left SENTINEL NODE BIOPSY AND AXILLARY NODE DISSECTION BILATERAL;  Surgeon: Duglas Lawrence MD;  Location:  [facility] OR;  Service: General;  Laterality: Bilateral;    WRIST FUSION Left      HEALTH MAINTENANCE ITEMS:  Health Maintenance Due   Topic Date Due    TDAP/TD VACCINES (1 - Tdap) Never done    ZOSTER VACCINE (1 of 2) Never done    RSV Vaccine - Adults (1 - 1-dose 60+ series) Never done    HEPATITIS C SCREENING  Never done    ANNUAL WELLNESS VISIT  Never done    Pneumococcal Vaccine 65+ (1 of 1 - PCV) Never done    COVID-19 Vaccine (6 - 2023-24 season) 09/01/2023       <no information>  Last Completed Colonoscopy            COLORECTAL CANCER SCREENING (COLONOSCOPY - Every 10 Years) Next due on 6/6/2029 06/06/2019  Surgical  "Procedure: COLONOSCOPY    06/06/2019  COLONOSCOPY                  Immunization History   Administered Date(s) Administered    COVID-19 (PFIZER) Purple Cap Monovalent 03/25/2021, 04/15/2021, 12/15/2021     Last Completed Mammogram       This patient has no relevant Health Maintenance data.              FAMILY HISTORY:  Family History   Problem Relation Age of Onset    No Known Problems Mother     Skin cancer Father     No Known Problems Sister     No Known Problems Brother     No Known Problems Sister     No Known Problems Brother     No Known Problems Son     No Known Problems Son     No Known Problems Son     No Known Problems Daughter     No Known Problems Maternal Grandmother     No Known Problems Paternal Grandmother     No Known Problems Maternal Aunt     No Known Problems Paternal Aunt     No Known Problems Other     Breast cancer Neg Hx     Colon cancer Neg Hx     Colon polyps Neg Hx     BRCA 1/2 Neg Hx     Endometrial cancer Neg Hx     Ovarian cancer Neg Hx      SOCIAL HISTORY:  Social History     Socioeconomic History    Marital status:    Tobacco Use    Smoking status: Never     Passive exposure: Never    Smokeless tobacco: Never   Vaping Use    Vaping status: Never Used   Substance and Sexual Activity    Alcohol use: Not Currently     Comment: Rare occasions in past socially    Drug use: No    Sexual activity: Yes     Partners: Male     Birth control/protection: Post-menopausal     Comment: Spouse only       REVIEW OF SYSTEMS:    Review of Systems   Constitutional:  Negative for fatigue, fever and unexpected weight change.        \"I feel good.\"   HENT:  Positive for congestion. Negative for mouth sores.    Eyes:  Negative for discharge and redness.   Respiratory:  Negative for shortness of breath and wheezing.    Cardiovascular:  Negative for chest pain and leg swelling.   Gastrointestinal:  Negative for constipation, diarrhea, nausea and vomiting.   Endocrine: Negative for cold intolerance and " "heat intolerance.   Genitourinary:  Negative for difficulty urinating and dysuria.   Musculoskeletal:  Negative for gait problem and joint swelling.   Skin:  Negative for pallor.   Allergic/Immunologic: Negative for food allergies.   Neurological:  Negative for dizziness and speech difficulty.   Hematological:  Negative for adenopathy. Does not bruise/bleed easily.   Psychiatric/Behavioral:  Negative for agitation, confusion and hallucinations.        VITAL SIGNS: /68   Pulse 91   Temp 98.3 °F (36.8 °C)   Ht 167.6 cm (66\")   Wt 56.7 kg (125 lb)   SpO2 100%   Breastfeeding No   BMI 20.18 kg/m²   Pain Score    05/23/24 0847   PainSc: 0-No pain       PHYSICAL EXAMINATION:     Physical Exam  Vitals reviewed.   Constitutional:       General: She is not in acute distress.  HENT:      Head: Normocephalic and atraumatic.   Eyes:      General: No scleral icterus.  Cardiovascular:      Rate and Rhythm: Normal rate.   Pulmonary:      Effort: No respiratory distress.      Breath sounds: No wheezing.   Abdominal:      General: Bowel sounds are normal.      Palpations: Abdomen is soft.      Tenderness: There is no abdominal tenderness.   Musculoskeletal:         General: No swelling.      Cervical back: Neck supple.   Skin:     Coloration: Skin is not pale.   Neurological:      Mental Status: She is alert and oriented to person, place, and time.   Psychiatric:         Mood and Affect: Mood normal.         Behavior: Behavior normal.         Thought Content: Thought content normal.         Judgment: Judgment normal.         LABS    Lab Results - Last 18 Months   Lab Units 05/16/24  0801 02/22/24  0759 11/16/23  1207 10/12/23  0813 04/12/23  0807   HEMOGLOBIN g/dL 13.1 13.7 12.7 13.7 14.1   HEMATOCRIT % 41.6 43.4 40.8 45.0 44.8   MCV fL 87.0 87.0 88.1 88.1 88.4   WBC 10*3/mm3 3.92 3.71 5.30 3.80 4.24   RDW % 13.5 12.9 13.3 13.2 13.2   MPV fL 9.8 9.8 9.9 9.8 9.6   PLATELETS 10*3/mm3 212 212 225 208 224   IMM GRAN % % 0.3 " "0.0  --  0.3 0.2   NEUTROS ABS 10*3/mm3 2.23 1.99  --  2.07 2.29   LYMPHS ABS 10*3/mm3 1.07 1.07  --  1.14 1.27   MONOS ABS 10*3/mm3 0.35 0.41  --  0.39 0.39   EOS ABS 10*3/mm3 0.20 0.19  --  0.14 0.22   BASOS ABS 10*3/mm3 0.06 0.05  --  0.05 0.06   IMMATURE GRANS (ABS) 10*3/mm3 0.01 0.00  --  0.01 0.01   NRBC /100 WBC 0.0 0.0  --  0.0 0.0       Lab Results - Last 18 Months   Lab Units 05/16/24  0801 02/22/24  0759 11/16/23  1207 10/12/23  0813 04/12/23  0807   GLUCOSE mg/dL 74 88 108* 85 94   SODIUM mmol/L 143 143 143 142 142   POTASSIUM mmol/L 4.6 4.3 4.4 4.5 4.7   CO2 mmol/L 32.0* 32.0* 31.0* 29.0 28.0   CHLORIDE mmol/L 105 104 106 104 105   ANION GAP mmol/L 6.0 7.0 6.0 9.0 9.0   CREATININE mg/dL 0.59 0.67 0.61 0.67 0.63   BUN mg/dL 16 14 15 12 13   BUN / CREAT RATIO  27.1* 20.9 24.6 17.9 20.6   CALCIUM mg/dL 9.4 9.6 9.1 9.7 9.9   ALK PHOS U/L 75 78  --  75 80   TOTAL PROTEIN g/dL 6.9 7.2  --  7.1 7.2   ALT (SGPT) U/L 11 9  --  10 11   AST (SGOT) U/L 16 16  --  17 18   BILIRUBIN mg/dL 0.5 0.6  --  0.6 0.7   ALBUMIN g/dL 4.5 4.5  --  4.5 4.6   GLOBULIN gm/dL 2.4 2.7  --  2.6 2.6       Lab Results - Last 18 Months   Lab Units 05/16/24  0801 02/22/24  0759 10/12/23  0813 04/12/23  0807   CEA ng/mL 1.92 2.03 2.22 2.21       No results for input(s): \"IRON\", \"TIBC\", \"LABIRON\", \"FERRITIN\", \"U1APFRB\", \"TSH\", \"FOLATE\" in the last 92255 hours.    Invalid input(s): \"VITB12\"    Adriana Mohan Arvind reports a pain score of 0.        ASSESSMENT:  1.   Left breast cancer, upper outer quadrant and upper lower quadrant, multifocal. Tumor size 1.7 cm followed by 10 mm and 8 mm.  No lymphovascular invasion.  Negative margins. Oncotype DX score, 2.  Less than 1% benefit for chemo.  3% distant recurrence risk at 9 years.  AJCC stage: 1a (pT1c, psnN0, cM0, G2).  Receptor status: Estrogen 95%, progesterone 84% and HER2/gregory negative by FISH.  Low Ki-67.  Treatment status:Patient had undergone bilateral mastectomy with sentinel lymph node " "resection from the left axilla on 11/21/2023.  On adjuvant letrozole from 3/6/2024 through present.    2.   Performance status of 0.   3.   Self directed care.   4.   Osteopenia 11/3/2023.  Patient on Caltrate D.  5.   Internal hemorrhoids.   6.   Elevated CEA, history of.    7.   Rash from Decadron.  8.   Grade 1 nausea from chemo, resolved.  9.   Grade 1 fatigue from chemo, resolved.   10.  Carcinoma of the right upper outer breast. Invasive ductal carcinoma:  Stage: AJCC IIA (pT1b, pN1a, M0, G2)   Tumor size 9 mm.  Hormone Status: %, ME 86%, and negative HER-2/gregory.  Baseline Node Status: 1/2 sentinel node positive for metastasis.   Complications of Tumor: None.   Treatment status: Post adjuvant docetaxel and cyclophosphamide.   Post adjuvant radiation 02/2019.   Declined adjuvant Femara.   Prognosis: Fair.  11.  Ductal carcinoma in situ, left, postmastectomy.              PLAN:  1.    Re:  Oncotype Dx report showed recurrence score of 2.  Less than 1% benefit for adjuvant chemo.  3% distant recurrence risk at 9 years..       2.    Re:  Heme status. WBC 3.92, hemoglobin 13.1 and platelet 212.  3.    Re:  Pre-office CMP.  GFR 97.7 ml/min and CO2 at 32 from 32.  4.    Re:  Pre-office CEA at 1.92 from 2.03 from 2.22.  5.    Re:  Pre-office CA27-29 at 12.7 from 13.8 from 14.  6.    Re: Tolerance to adjuvant letrozole. \"It's okay.\"  7.   Refer to genetic counseling for genetic testing. \"I just don't want to. It won't affect my treatment. That is okay.\"  8.   eRx for letrozole 2.5 mg p.o. daily, 60, 3 refills if needed.  Monitor for hot flashes, thrombosis, weight gain or worsening bone loss. Plan for 5 years.   9.   Continue ongoing management per primary care physician and the other specialists.  10.  Plan of care discussed with patient.  Understanding expressed.  She is agreeable to proceed.  11.  Continue Caltrate D twice daily secondary to osteopenia.  12.  Her next " bone density 11/2025.   13.  No further mammograms, post bilateral mastectomy.   14.  She will be seen 1-4 times per year for 5 years.  15.  Return to the office in 3 months with CBC with differential, CMP, CEA and CA 27-29, use right arm, same day.   16.  Advance Care Planning  ACP discussion was held with the patient during this visit. Patient has an advance directive in EMR which is still valid.          I have reviewed the assessment and plan and verified the accuracy of it. No changes to assessment and plan since the information was documented. Nahid Linton MD 05/23/24           I spent 30 total minutes, face-to-face, caring for Adriana today. Greater than 50% of this time involved counseling and/or coordination of care as documented within this note.           cc: Arie Renteria MD        (Jonathan Williamson MD)

## 2024-05-16 ENCOUNTER — LAB (OUTPATIENT)
Dept: LAB | Facility: HOSPITAL | Age: 69
End: 2024-05-16
Payer: MEDICARE

## 2024-05-16 DIAGNOSIS — Z85.3 HISTORY OF BREAST CANCER: ICD-10-CM

## 2024-05-16 DIAGNOSIS — C50.412 MALIGNANT NEOPLASM OF UPPER-OUTER QUADRANT OF LEFT BREAST IN FEMALE, ESTROGEN RECEPTOR POSITIVE: ICD-10-CM

## 2024-05-16 DIAGNOSIS — Z17.0 MALIGNANT NEOPLASM OF UPPER-OUTER QUADRANT OF LEFT BREAST IN FEMALE, ESTROGEN RECEPTOR POSITIVE: ICD-10-CM

## 2024-05-16 LAB
ALBUMIN SERPL-MCNC: 4.5 G/DL (ref 3.5–5.2)
ALBUMIN/GLOB SERPL: 1.9 G/DL
ALP SERPL-CCNC: 75 U/L (ref 39–117)
ALT SERPL W P-5'-P-CCNC: 11 U/L (ref 1–33)
ANION GAP SERPL CALCULATED.3IONS-SCNC: 6 MMOL/L (ref 5–15)
AST SERPL-CCNC: 16 U/L (ref 1–32)
BASOPHILS # BLD AUTO: 0.06 10*3/MM3 (ref 0–0.2)
BASOPHILS NFR BLD AUTO: 1.5 % (ref 0–1.5)
BILIRUB SERPL-MCNC: 0.5 MG/DL (ref 0–1.2)
BUN SERPL-MCNC: 16 MG/DL (ref 8–23)
BUN/CREAT SERPL: 27.1 (ref 7–25)
CALCIUM SPEC-SCNC: 9.4 MG/DL (ref 8.6–10.5)
CEA SERPL-MCNC: 1.92 NG/ML
CHLORIDE SERPL-SCNC: 105 MMOL/L (ref 98–107)
CO2 SERPL-SCNC: 32 MMOL/L (ref 22–29)
CREAT SERPL-MCNC: 0.59 MG/DL (ref 0.57–1)
DEPRECATED RDW RBC AUTO: 43.4 FL (ref 37–54)
EGFRCR SERPLBLD CKD-EPI 2021: 97.7 ML/MIN/1.73
EOSINOPHIL # BLD AUTO: 0.2 10*3/MM3 (ref 0–0.4)
EOSINOPHIL NFR BLD AUTO: 5.1 % (ref 0.3–6.2)
ERYTHROCYTE [DISTWIDTH] IN BLOOD BY AUTOMATED COUNT: 13.5 % (ref 12.3–15.4)
GLOBULIN UR ELPH-MCNC: 2.4 GM/DL
GLUCOSE SERPL-MCNC: 74 MG/DL (ref 65–99)
HCT VFR BLD AUTO: 41.6 % (ref 34–46.6)
HGB BLD-MCNC: 13.1 G/DL (ref 12–15.9)
IMM GRANULOCYTES # BLD AUTO: 0.01 10*3/MM3 (ref 0–0.05)
IMM GRANULOCYTES NFR BLD AUTO: 0.3 % (ref 0–0.5)
LYMPHOCYTES # BLD AUTO: 1.07 10*3/MM3 (ref 0.7–3.1)
LYMPHOCYTES NFR BLD AUTO: 27.3 % (ref 19.6–45.3)
MCH RBC QN AUTO: 27.4 PG (ref 26.6–33)
MCHC RBC AUTO-ENTMCNC: 31.5 G/DL (ref 31.5–35.7)
MCV RBC AUTO: 87 FL (ref 79–97)
MONOCYTES # BLD AUTO: 0.35 10*3/MM3 (ref 0.1–0.9)
MONOCYTES NFR BLD AUTO: 8.9 % (ref 5–12)
NEUTROPHILS NFR BLD AUTO: 2.23 10*3/MM3 (ref 1.7–7)
NEUTROPHILS NFR BLD AUTO: 56.9 % (ref 42.7–76)
NRBC BLD AUTO-RTO: 0 /100 WBC (ref 0–0.2)
PLATELET # BLD AUTO: 212 10*3/MM3 (ref 140–450)
PMV BLD AUTO: 9.8 FL (ref 6–12)
POTASSIUM SERPL-SCNC: 4.6 MMOL/L (ref 3.5–5.2)
PROT SERPL-MCNC: 6.9 G/DL (ref 6–8.5)
RBC # BLD AUTO: 4.78 10*6/MM3 (ref 3.77–5.28)
SODIUM SERPL-SCNC: 143 MMOL/L (ref 136–145)
WBC NRBC COR # BLD AUTO: 3.92 10*3/MM3 (ref 3.4–10.8)

## 2024-05-16 PROCEDURE — 86300 IMMUNOASSAY TUMOR CA 15-3: CPT

## 2024-05-16 PROCEDURE — 80053 COMPREHEN METABOLIC PANEL: CPT

## 2024-05-16 PROCEDURE — 36415 COLL VENOUS BLD VENIPUNCTURE: CPT

## 2024-05-16 PROCEDURE — 82378 CARCINOEMBRYONIC ANTIGEN: CPT

## 2024-05-16 PROCEDURE — 85025 COMPLETE CBC W/AUTO DIFF WBC: CPT

## 2024-05-17 LAB — CANCER AG27-29 SERPL-ACNC: 12.7 U/ML (ref 0–38.6)

## 2024-05-23 ENCOUNTER — OFFICE VISIT (OUTPATIENT)
Dept: ONCOLOGY | Facility: CLINIC | Age: 69
End: 2024-05-23
Payer: MEDICARE

## 2024-05-23 VITALS
DIASTOLIC BLOOD PRESSURE: 68 MMHG | HEIGHT: 66 IN | OXYGEN SATURATION: 100 % | HEART RATE: 91 BPM | SYSTOLIC BLOOD PRESSURE: 115 MMHG | BODY MASS INDEX: 20.09 KG/M2 | WEIGHT: 125 LBS | TEMPERATURE: 98.3 F

## 2024-05-23 DIAGNOSIS — C50.411 MALIGNANT NEOPLASM OF UPPER-OUTER QUADRANT OF RIGHT BREAST IN FEMALE, ESTROGEN RECEPTOR POSITIVE: Primary | ICD-10-CM

## 2024-05-23 DIAGNOSIS — Z17.0 MALIGNANT NEOPLASM OF UPPER-OUTER QUADRANT OF RIGHT BREAST IN FEMALE, ESTROGEN RECEPTOR POSITIVE: Primary | ICD-10-CM

## 2024-08-14 NOTE — PROGRESS NOTES
"[facility] HEMATOLOGY & ONCOLOGY  [address]  [facility] [address] [phone]    Patient Name: Adriana Samuels  Encounter Date: 08/22/2024  YOB: 1955  Patient Number: [id number]      REASON FOR FOLLOW-UP: Ms. Adriana Samuels is a pleasant 69 year-old  female, post menopausal, who is seen on followup for Stage IIA multicentric right breast carcinoma, upper outer quadrant, lower outer quadrant, receptor positive.  Patient is taking adjuvant letrozole on 3/6/2024 through present.   She is seen 68.75 months from 4 cycles of adjuvant docetaxel and cyclophosphamide from previous stage IIa right breast cancer in 2018. She is seen alone.  History is considered reliable.         Oncology/Hematology History Overview Note   DIAGNOSTIC ABNORMALITIES:  Mammogram 11/3/2023.  Suspicious microcalcifications upper outer left breast. Biopsy is recommended. BI-RADS 4. Stable postoperative changes of the right breast.    Sonogram guided biopsy 11/8/2023.  Sonography demonstrates a hypoechoic lesion with irregular margins in the upper outer quadrant of the left breast corresponding to the lesion noted on recent mammography. A total of 6 vacuum assisted core specimens were obtained the majority of which contain calcifications. A HydroMARK clip was left in place.  Also demonstrated was a 3 x 4 mm solid nodule with lobular margins approximate 1.2 cm medial to the larger lesion within the left breast. This was biopsied using a Monopty biopsy gun with a total of 6 core specimens obtained. This portion of the biopsy was somewhat difficult as the nodule was noted to well superiorly and inferiorly upon attempted firing of the instrument. There were at least 2 biopsies which I feel penetrated the central aspect of the lesion. A true michael X clip was left adjacent to this nodule.  Pathology report 11/13/2023.  Left breast, designated \"mass/distortion " "2:00\", 4 cm from the nipple, biopsy:Invasive carcinoma of the breast, no special type, with areas of lobular pattern growth and the following features: Modified Rome grade 2 (score 3 for tubules, score 2 for nuclear grade, score 1 for mitoses).Lymphovascular space invasion: Not identified.Ductal carcinoma in situ: Present, solid and cribriform growth patterns, intermediate nuclear grade with single cell necrosis and calcifications. Extent: Tumor involves 6 of 6 cores, largest span 14 mm. Additional findings: Calcifications associated with invasive carcinoma. Left breast, designated \"mass 2:00\", 3 cm from the nipple, biopsy:  Invasive carcinoma of the breast, no special type, with the following features: Modified Graciela grade 2 (score 3 for tubules, score 2 for nuclear grade, score 1 for mitoses).Lymphovascular space invasion: Not identified.Ductal carcinoma in situ: Not identified.Extent: 3 of 6 fragmented cores, largest span 2 mm.    Pathology report 11/22/2023.1.  Right breast, mastectomy: Nonneoplastic breast with prior surgical site identified.No residual/recurrent tumor identified.  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 Associated intermediate grade ductal carcinoma in situ, solid and cribriform type.Surgical resection margins free of invasive and in situ carcinoma.AJCC pathologic stage:  pT1c(m) N0(sn).            PREVIOUS INTERVENTIONS:  Patient had undergone bilateral mastectomy with sentinel lymph node resection from the left axilla on 11/21/2023.  Adjuvant letrozole on 3/6/2024 through present.          DIAGNOSTIC ABNORMALITIES:   The patient had a mammogram from [facility] that showed a lesion 5.6 mm at the 10 o'clock position also noted on ultrasound suspicious for malignancy. The patient was referred for nodule in the right upper outer breast.  The patient was seen by Dr. Lawrence 09/05/2018.  Examination showed small palpable " abnormality at 10 and 11 o'clock positions, 0.5 cm to 1 cm in size. No adenopathy noted. Patient planned for excision and sentinel node evaluation.  Pathology report 09/11/2018: Invasive ductal carcinoma intermediate grade, 1 out of 2 sentinel lymph nodes positive for metastasis. The largest metastatic focus is 2.5 mm. Negative for extracapsular extension. Tumor measures 9 mm in greatest linear dimension. Margins of resection are negative. Tumor measures 2 mm from closest anterior margin. AJCC T1b, N1a. ER/MO positive and HER-2/gregory negative.  CT abdomen and pelvis performed at [facility] on 09/21/2018 was revealing for IMPRESSION: 1. Post therapy changes right breast and axilla. 2. Linear nodularity left lung base, suspect chronic changes. 3. Otherwise, No CT evidence of definite metastatic disease in chest or acute cardiopulmonary process.   Bone scan performed at [facility] on 09/21/2018 was revealing for IMPRESSION: 1. Focal increased tracer activity identified in the left lateral mid-cervical spine, suspect degenerative changes. Plain radiographic imaging could further characterize. 2. Otherwise, No scintigraphic evidence of osseous metastases.   Ultrasound of liver performed at [facility] on 09/26/2018 was revealing for Summary:  Multiple hepatic cysts.      PREVIOUS INTERVENTIONS:   The patient had undergone right breast lumpectomy with sentinel lymph node biopsy 09/10/2018 by Dr. Lawrence.  Adjuvant Taxotere and Cytoxan 09/25/2018 through 11/27/2018 at the [facility]. 4 cycles.   Adjuvant radiation completed 02/2019 by Dr. Williamson.  Adjuvant Femara 2.5 mg po daily, she declined.            Malignant neoplasm of upper-outer quadrant of right female breast    Initial Diagnosis    Malignant neoplasm of right female breast (CMS/HCC)     9/10/2018 Surgery    Final Diagnosis   1.  Breast, right, lumpectomy and sentinel lymph node resection:  Invasive ductal carcinoma, intermediate grade  Tumor  measures 9 mm in greatest linear dimension  Margins of resection are negative  Tumor measures 2 mm from the closest, anterior, margin.     Positive for metastatic ductal carcinoma in one out of 2 sentinel lymph nodes (1/2)  The largest metastatic focus is 2.5 mm  Negative for extra capsular extension     AJCC cancer stage: pT1b, (sn)pN1a            9/21/2018 Imaging    Ct Chest With Contrast  1. Post therapy changes right breast and axilla.   2. Linear nodularity left lung base, suspect chronic changes.   3. Otherwise, No CT evidence of definite metastatic disease in chest or acute cardiopulmonary process.     Nm Bone Scan Whole Body  1. Focal increased tracer activity identified in the left lateral mid cervical spine, suspect degenerative changes. Plain radiographic imaging could further characterize.   2. Otherwise, No scintigraphic evidence of osseous metastases.     Ct Abdomen Pelvis With Contrast  1. Multiple low-attenuation hepatic lesions, hepatic cyst most likely.   2. Suspect uterine fibroid   3. Otherwise, No CT evidence of metastatic disease or acute intra-abdominal/pelvic pathological process.      9/25/2018 - 11/27/2018 Chemotherapy    Taxotere Cytoxan     1/15/2019 - 2/25/2019 Radiation    Radiation OncologyTreatment Course:  Adriana Samuels received 6000 cGy in 30 fractions to right breast, right supraclavicular area, and right axilla via external beam radiation therapy.     10/28/2019 Imaging    Bilateral Diagnostic Mammogram:  IMPRESSION:  Posttherapeutic changes of the right breast are stable over  a one year time period and no suspicious findings are identified. The  patient is due to return in 12 months for bilateral digital mammograms.  BI-RADS Category 2, benign.     10/29/2020 Imaging    Bilateral Diagnostic Mammogram:  IMPRESSION:  1. No mammographic evidence of malignancy.  2. BI-RADS CATEGORY 2: Benign findings.  3. Recommend annual screening mammography.     Malignant neoplasm of upper-outer  quadrant of left breast in female, estrogen receptor positive   1/25/2024 Initial Diagnosis    Malignant neoplasm of upper-outer quadrant of left breast in female, estrogen receptor positive     1/25/2024 Cancer Staged    Staging form: Breast, AJCC 8th Edition  - Pathologic stage from 1/25/2024: Stage IA (pT1c, pN0, cM0, G2, ER+, UT+, HER2-) - Signed by Nahid Linton MD on 1/25/2024         PAST MEDICAL HISTORY:  ALLERGIES:  Allergies   Allergen Reactions    Bactrim [Sulfamethoxazole-Trimethoprim] Rash    Cortisone Rash    Decadron [Dexamethasone] Hives    Hydrocortisone Hives     Oral or IV    Clindamycin/Lincomycin Hives    Erythromycin Nausea Only and Rash     CURRENT MEDICATIONS:  Outpatient Encounter Medications as of 8/22/2024   Medication Sig Dispense Refill    aspirin 81 MG chewable tablet Chew 1 tablet Daily.      Biotin 1 MG capsule Take 1 capsule by mouth Daily.      Calcium Carb-Cholecalciferol (CALCIUM 1000 + D PO) Take  by mouth Daily.      glucosamine sulfate 500 MG capsule capsule Take  by mouth 3 (Three) Times a Day With Meals.      letrozole (FEMARA) 2.5 MG tablet Take 1 tablet by mouth Daily. 90 tablet 3    Multiple Vitamins-Minerals (MULTIVITAMIN WITH MINERALS) tablet tablet Take 1 tablet by mouth Daily.      Probiotic Product (PROBIOTIC PO) Take 1 capsule by mouth Daily.       Facility-Administered Encounter Medications as of 8/22/2024   Medication Dose Route Frequency Provider Last Rate Last Admin    [DISCONTINUED] lidocaine (XYLOCAINE) 1 % injection 10 mL  10 mL Subcutaneous Once Gabriel Hernandez MD        [DISCONTINUED] lidocaine (XYLOCAINE) 1 % injection 10 mL  10 mL Subcutaneous Once Gabriel Hernandez MD        [DISCONTINUED] lidocaine 1% - EPINEPHrine 1:458037 (XYLOCAINE W/EPI) 1 %-1:486415 injection 10 mL  10 mL Injection Once Gabriel Hernandez MD        [DISCONTINUED] lidocaine 1% - EPINEPHrine 1:055747 (XYLOCAINE W/EPI) 1 %-1:042947 injection 10 mL  10 mL Injection Once  Gabriel Hernandez MD         ADULT ILLNESSES:  Patient Active Problem List   Diagnosis Code    Malignant neoplasm of upper-outer quadrant of right female breast C50.411    Non-smoker Z78.9    S/P lumpectomy, right breast Z98.890    S/P lymph node biopsy Z98.890    History of radiation therapy Z92.3    Prophylactic use of letrozole (Femara) Z79.811    Screening mammogram for high-risk patient Z12.31    History of left breast cancer Z85.3    Malignant neoplasm of upper-outer quadrant of left breast in female, estrogen receptor positive C50.412, Z17.0     SURGERIES:  Past Surgical History:   Procedure Laterality Date    BREAST BIOPSY Right 09/2018    positive    BREAST LUMPECTOMY Right 09/2018    lumpectomy with chemo and rad tx    BREAST LUMPECTOMY WITH SENTINEL NODE BIOPSY Right 09/10/2018    Procedure: RIGHT BREAST LUMPECTOMY WITH SENTINEL LYMPH NODE BIOPSY, INJECTION AND SCAN;  Surgeon: Duglas Lawrence MD;  Location: [facility] OR;  Service: General    COLONOSCOPY N/A 06/06/2019    Procedure: COLONOSCOPY WITH ANESTHESIA;  Surgeon: Brock Raman MD;  Location: [facility] ENDOSCOPY;  Service: Gastroenterology    HYDROCELE EXCISION / REPAIR      LASER ABLATION OF THE CERVIX      MASTECTOMY WITH SENTINEL NODE BIOPSY AND AXILLARY NODE DISSECTION Bilateral 11/21/2023    Procedure: BREAST MASTECTOMY WITH left SENTINEL NODE BIOPSY AND AXILLARY NODE DISSECTION BILATERAL;  Surgeon: Duglas Lawrence MD;  Location: [facility] OR;  Service: General;  Laterality: Bilateral;    WRIST FUSION Left      HEALTH MAINTENANCE ITEMS:  Health Maintenance Due   Topic Date Due    TDAP/TD VACCINES (1 - Tdap) Never done    ZOSTER VACCINE (1 of 2) Never done    HEPATITIS C SCREENING  Never done    ANNUAL WELLNESS VISIT  Never done    Pneumococcal Vaccine 65+ (1 of 1 - PCV) Never done    COVID-19 Vaccine (6 - 2023-24 season) 09/01/2023    INFLUENZA VACCINE  08/01/2024       <no information>  Last Completed Colonoscopy            COLORECTAL  CANCER SCREENING (COLONOSCOPY - Every 10 Years) Next due on 6/6/2029 06/06/2019  Surgical Procedure: COLONOSCOPY    06/06/2019  COLONOSCOPY                  Immunization History   Administered Date(s) Administered    COVID-19 (PFIZER) Purple Cap Monovalent 03/25/2021, 04/15/2021, 12/15/2021     Last Completed Mammogram       This patient has no relevant Health Maintenance data.              FAMILY HISTORY:  Family History   Problem Relation Age of Onset    No Known Problems Mother     Skin cancer Father     No Known Problems Sister     No Known Problems Brother     No Known Problems Sister     No Known Problems Brother     No Known Problems Son     No Known Problems Son     No Known Problems Son     No Known Problems Daughter     No Known Problems Maternal Grandmother     No Known Problems Paternal Grandmother     No Known Problems Maternal Aunt     No Known Problems Paternal Aunt     No Known Problems Other     Breast cancer Neg Hx     Colon cancer Neg Hx     Colon polyps Neg Hx     BRCA 1/2 Neg Hx     Endometrial cancer Neg Hx     Ovarian cancer Neg Hx      SOCIAL HISTORY:  Social History     Socioeconomic History    Marital status:    Tobacco Use    Smoking status: Never     Passive exposure: Never    Smokeless tobacco: Never   Vaping Use    Vaping status: Never Used   Substance and Sexual Activity    Alcohol use: Not Currently     Comment: Rare occasions in past socially    Drug use: No    Sexual activity: Yes     Partners: Male     Birth control/protection: Post-menopausal     Comment: Spouse only       REVIEW OF SYSTEMS:    Review of Systems   Constitutional:  Negative for fatigue and fever.   HENT:  Negative for congestion and mouth sores.    Eyes:  Negative for redness and visual disturbance.   Respiratory:  Negative for shortness of breath and wheezing.    Cardiovascular:  Negative for chest pain and palpitations.   Gastrointestinal:  Negative for nausea and vomiting.   Endocrine: Negative for  "cold intolerance and heat intolerance.   Genitourinary:  Negative for flank pain and hematuria.   Musculoskeletal:  Negative for gait problem and myalgias.   Skin:  Negative for pallor.   Allergic/Immunologic: Negative for food allergies.   Neurological:  Negative for dizziness, speech difficulty and weakness.   Hematological:  Negative for adenopathy. Does not bruise/bleed easily.   Psychiatric/Behavioral:  Negative for agitation and hallucinations.        VITAL SIGNS: /62   Pulse 76   Temp 97.2 °F (36.2 °C)   Resp 18   Ht 167.6 cm (66\")   Wt 57.2 kg (126 lb)   SpO2 99%   Breastfeeding No   BMI 20.34 kg/m²   Pain Score    08/22/24 0824   PainSc: 0-No pain       PHYSICAL EXAMINATION:     Physical Exam  Vitals reviewed.   Constitutional:       General: She is not in acute distress.  HENT:      Head: Normocephalic and atraumatic.   Eyes:      General: No scleral icterus.  Cardiovascular:      Rate and Rhythm: Normal rate.   Pulmonary:      Effort: No respiratory distress.      Breath sounds: No wheezing.   Abdominal:      General: Bowel sounds are normal.      Palpations: Abdomen is soft.      Tenderness: There is no abdominal tenderness.   Musculoskeletal:      Cervical back: Neck supple.   Skin:     General: Skin is warm.      Coloration: Skin is not pale.   Neurological:      Mental Status: She is alert and oriented to person, place, and time.   Psychiatric:         Mood and Affect: Mood normal.         Behavior: Behavior normal.         Thought Content: Thought content normal.         Judgment: Judgment normal.         LABS    Lab Results - Last 18 Months   Lab Units 08/15/24  0849 05/16/24  0801 02/22/24  0759 11/16/23  1207 10/12/23  0813 04/12/23  0807   HEMOGLOBIN g/dL 13.0 13.1 13.7 12.7 13.7 14.1   HEMATOCRIT % 39.9 41.6 43.4 40.8 45.0 44.8   MCV fL 85.4 87.0 87.0 88.1 88.1 88.4   WBC 10*3/mm3 4.33 3.92 3.71 5.30 3.80 4.24   RDW % 13.2 13.5 12.9 13.3 13.2 13.2   MPV fL 9.7 9.8 9.8 9.9 9.8 9.6 " "  PLATELETS 10*3/mm3 211 212 212 225 208 224   IMM GRAN % % 0.2 0.3 0.0  --  0.3 0.2   NEUTROS ABS 10*3/mm3 2.41 2.23 1.99  --  2.07 2.29   LYMPHS ABS 10*3/mm3 1.21 1.07 1.07  --  1.14 1.27   MONOS ABS 10*3/mm3 0.43 0.35 0.41  --  0.39 0.39   EOS ABS 10*3/mm3 0.21 0.20 0.19  --  0.14 0.22   BASOS ABS 10*3/mm3 0.06 0.06 0.05  --  0.05 0.06   IMMATURE GRANS (ABS) 10*3/mm3 0.01 0.01 0.00  --  0.01 0.01   NRBC /100 WBC 0.0 0.0 0.0  --  0.0 0.0       Lab Results - Last 18 Months   Lab Units 08/15/24  0849 05/16/24  0801 02/22/24  0759 11/16/23  1207 10/12/23  0813 04/12/23  0807   GLUCOSE mg/dL 96 74 88 108* 85 94   SODIUM mmol/L 142 143 143 143 142 142   POTASSIUM mmol/L 4.5 4.6 4.3 4.4 4.5 4.7   CO2 mmol/L 29.0 32.0* 32.0* 31.0* 29.0 28.0   CHLORIDE mmol/L 104 105 104 106 104 105   ANION GAP mmol/L 9.0 6.0 7.0 6.0 9.0 9.0   CREATININE mg/dL 0.61 0.59 0.67 0.61 0.67 0.63   BUN mg/dL 15 16 14 15 12 13   BUN / CREAT RATIO  24.6 27.1* 20.9 24.6 17.9 20.6   CALCIUM mg/dL 9.5 9.4 9.6 9.1 9.7 9.9   ALK PHOS U/L 75 75 78  --  75 80   TOTAL PROTEIN g/dL 7.0 6.9 7.2  --  7.1 7.2   ALT (SGPT) U/L 10 11 9  --  10 11   AST (SGOT) U/L 17 16 16  --  17 18   BILIRUBIN mg/dL 0.6 0.5 0.6  --  0.6 0.7   ALBUMIN g/dL 4.4 4.5 4.5  --  4.5 4.6   GLOBULIN gm/dL 2.6 2.4 2.7  --  2.6 2.6       Lab Results - Last 18 Months   Lab Units 08/15/24  0849 05/16/24  0801 02/22/24  0759 10/12/23  0813 04/12/23  0807   CEA ng/mL 1.91 1.92 2.03 2.22 2.21       No results for input(s): \"IRON\", \"TIBC\", \"LABIRON\", \"FERRITIN\", \"J8LHIGO\", \"TSH\", \"FOLATE\" in the last 16130 hours.    Invalid input(s): \"VITB12\"    Adriana Mohan Arvind reports a pain score of 0.           ASSESSMENT:  1.   Left breast cancer, upper outer quadrant, lower outer quadrant, multicentric. Tumor size 1.7 cm followed by 10 mm and 8 mm.  No lymphovascular invasion.  Negative margins. Oncotype DX score,  Less than 1% benefit for chemo.  3% distant recurrence risk at 9 years.  AJCC stage: 1a " "(pT1c, psnN0, cM0, G2).  Receptor status: Estrogen 95%, progesterone 84% and HER2/gregory negative by FISH.  Low Ki-67.  Treatment status:Patient had undergone bilateral mastectomy with sentinel lymph node resection from the left axilla on 11/21/2023.  On adjuvant letrozole from 3/6/2024 through present.    2.   Performance status of 0.   3.   Self directed care.   4.   Osteopenia 11/3/2023.  Patient on Caltrate D.  5.   Internal hemorrhoids.   6.   Elevated CEA, history of.    7.   Rash from Decadron.  8.   Grade 1 nausea from chemo, resolved.  9.   Grade 1 fatigue from chemo, resolved.   10.  Carcinoma of the right upper outer breast. Invasive ductal carcinoma:  Stage: AJCC IIA (pT1b, pN1a, M0, G2)   Tumor size 9 mm.  Hormone Status: %, WI 86%, and negative HER-2/gregory.  Baseline Node Status: 1/2 sentinel node positive for metastasis.   Complications of Tumor: None.   Treatment status: Post adjuvant docetaxel and cyclophosphamide.   Post adjuvant radiation 02/2019.   Declined adjuvant Femara.   Prognosis: Fair.  11.  Ductal carcinoma in situ, left, postmastectomy.              PLAN:  1.    Re: Tolerance to adjuvant letrozole. \"Losing my hair.\"  2.    Re:  Heme status. WBC 4.3, hemoglobin 13 and platelet 211.  3.    Re:  Pre-office CMP.  GFR 96.3 ml/min and CO2 at 29.  4.    Re:  Pre-office CEA  1.91 from 1.92 from 2.03 from 2.22.  5.    Re:  Pre-office CA27-29 15.5 from 12.7 from 13.8 from 14.  6.   Refer to genetic counseling for genetic testing. \"I don't want to know. It's another thing to think about.\"  7.   eRx for letrozole 2.5 mg p.o. daily, 60, 3 refills if needed.  Observe for weight gain, arthralgias,  thrombosis, weight gain, hair thinning, or worsening bone loss. Plan for 5 years.   8.   Continue ongoing management per primary care physician and the other specialists.  9.  Plan of care discussed with patient.  Understanding expressed.  Patient agreeable to " proceed.  10.  Continue Caltrate D twice daily secondary to osteopenia.  11.  Her next bone density 11/2025.   12.  No further mammograms, post bilateral mastectomy.   13.  She will be seen 1-4 times per year for 5 years.  14.  Return to the office in 3 months with CBC with differential, CMP, CEA and CA 27-29, use right arm, same day.   15.  Advance Care Planning  ACP discussion was held with the patient during this visit. Patient has an advance directive in EMR which is still valid.              I have reviewed the assessment and plan and verified the accuracy of it. No changes to assessment and plan since the information was documented. Nahid Linton MD 08/22/24         I spent 31 total minutes, face-to-face, caring for Adriana today. Greater than 50% of this time involved counseling and/or coordination of care as documented within this note.            cc: Arie Renteria MD        (Jonathan Williamson MD)

## 2024-08-15 ENCOUNTER — LAB (OUTPATIENT)
Dept: LAB | Facility: HOSPITAL | Age: 69
End: 2024-08-15
Payer: MEDICARE

## 2024-08-15 DIAGNOSIS — Z17.0 MALIGNANT NEOPLASM OF UPPER-OUTER QUADRANT OF RIGHT BREAST IN FEMALE, ESTROGEN RECEPTOR POSITIVE: ICD-10-CM

## 2024-08-15 DIAGNOSIS — C50.411 MALIGNANT NEOPLASM OF UPPER-OUTER QUADRANT OF RIGHT BREAST IN FEMALE, ESTROGEN RECEPTOR POSITIVE: ICD-10-CM

## 2024-08-15 DIAGNOSIS — Z85.3 HISTORY OF BREAST CANCER: ICD-10-CM

## 2024-08-15 LAB
ALBUMIN SERPL-MCNC: 4.4 G/DL (ref 3.5–5.2)
ALBUMIN/GLOB SERPL: 1.7 G/DL
ALP SERPL-CCNC: 75 U/L (ref 39–117)
ALT SERPL W P-5'-P-CCNC: 10 U/L (ref 1–33)
ANION GAP SERPL CALCULATED.3IONS-SCNC: 9 MMOL/L (ref 5–15)
AST SERPL-CCNC: 17 U/L (ref 1–32)
BASOPHILS # BLD AUTO: 0.06 10*3/MM3 (ref 0–0.2)
BASOPHILS NFR BLD AUTO: 1.4 % (ref 0–1.5)
BILIRUB SERPL-MCNC: 0.6 MG/DL (ref 0–1.2)
BUN SERPL-MCNC: 15 MG/DL (ref 8–23)
BUN/CREAT SERPL: 24.6 (ref 7–25)
CALCIUM SPEC-SCNC: 9.5 MG/DL (ref 8.6–10.5)
CEA SERPL-MCNC: 1.91 NG/ML
CHLORIDE SERPL-SCNC: 104 MMOL/L (ref 98–107)
CO2 SERPL-SCNC: 29 MMOL/L (ref 22–29)
CREAT SERPL-MCNC: 0.61 MG/DL (ref 0.57–1)
DEPRECATED RDW RBC AUTO: 41.1 FL (ref 37–54)
EGFRCR SERPLBLD CKD-EPI 2021: 96.3 ML/MIN/1.73
EOSINOPHIL # BLD AUTO: 0.21 10*3/MM3 (ref 0–0.4)
EOSINOPHIL NFR BLD AUTO: 4.8 % (ref 0.3–6.2)
ERYTHROCYTE [DISTWIDTH] IN BLOOD BY AUTOMATED COUNT: 13.2 % (ref 12.3–15.4)
GLOBULIN UR ELPH-MCNC: 2.6 GM/DL
GLUCOSE SERPL-MCNC: 96 MG/DL (ref 65–99)
HCT VFR BLD AUTO: 39.9 % (ref 34–46.6)
HGB BLD-MCNC: 13 G/DL (ref 12–15.9)
IMM GRANULOCYTES # BLD AUTO: 0.01 10*3/MM3 (ref 0–0.05)
IMM GRANULOCYTES NFR BLD AUTO: 0.2 % (ref 0–0.5)
LYMPHOCYTES # BLD AUTO: 1.21 10*3/MM3 (ref 0.7–3.1)
LYMPHOCYTES NFR BLD AUTO: 27.9 % (ref 19.6–45.3)
MCH RBC QN AUTO: 27.8 PG (ref 26.6–33)
MCHC RBC AUTO-ENTMCNC: 32.6 G/DL (ref 31.5–35.7)
MCV RBC AUTO: 85.4 FL (ref 79–97)
MONOCYTES # BLD AUTO: 0.43 10*3/MM3 (ref 0.1–0.9)
MONOCYTES NFR BLD AUTO: 9.9 % (ref 5–12)
NEUTROPHILS NFR BLD AUTO: 2.41 10*3/MM3 (ref 1.7–7)
NEUTROPHILS NFR BLD AUTO: 55.8 % (ref 42.7–76)
NRBC BLD AUTO-RTO: 0 /100 WBC (ref 0–0.2)
PLATELET # BLD AUTO: 211 10*3/MM3 (ref 140–450)
PMV BLD AUTO: 9.7 FL (ref 6–12)
POTASSIUM SERPL-SCNC: 4.5 MMOL/L (ref 3.5–5.2)
PROT SERPL-MCNC: 7 G/DL (ref 6–8.5)
RBC # BLD AUTO: 4.67 10*6/MM3 (ref 3.77–5.28)
SODIUM SERPL-SCNC: 142 MMOL/L (ref 136–145)
WBC NRBC COR # BLD AUTO: 4.33 10*3/MM3 (ref 3.4–10.8)

## 2024-08-15 PROCEDURE — 82378 CARCINOEMBRYONIC ANTIGEN: CPT

## 2024-08-15 PROCEDURE — 80053 COMPREHEN METABOLIC PANEL: CPT

## 2024-08-15 PROCEDURE — 86300 IMMUNOASSAY TUMOR CA 15-3: CPT

## 2024-08-15 PROCEDURE — 36415 COLL VENOUS BLD VENIPUNCTURE: CPT

## 2024-08-15 PROCEDURE — 85025 COMPLETE CBC W/AUTO DIFF WBC: CPT

## 2024-08-16 LAB — CANCER AG27-29 SERPL-ACNC: 15.5 U/ML (ref 0–38.6)

## 2024-08-22 ENCOUNTER — OFFICE VISIT (OUTPATIENT)
Dept: ONCOLOGY | Facility: CLINIC | Age: 69
End: 2024-08-22
Payer: MEDICARE

## 2024-08-22 VITALS
HEART RATE: 76 BPM | WEIGHT: 126 LBS | SYSTOLIC BLOOD PRESSURE: 118 MMHG | BODY MASS INDEX: 20.25 KG/M2 | DIASTOLIC BLOOD PRESSURE: 62 MMHG | TEMPERATURE: 97.2 F | RESPIRATION RATE: 18 BRPM | OXYGEN SATURATION: 99 % | HEIGHT: 66 IN

## 2024-08-22 DIAGNOSIS — C50.411 MALIGNANT NEOPLASM OF UPPER-OUTER QUADRANT OF RIGHT BREAST IN FEMALE, ESTROGEN RECEPTOR POSITIVE: Primary | ICD-10-CM

## 2024-08-22 DIAGNOSIS — Z17.0 MALIGNANT NEOPLASM OF UPPER-OUTER QUADRANT OF RIGHT BREAST IN FEMALE, ESTROGEN RECEPTOR POSITIVE: Primary | ICD-10-CM

## 2024-11-08 NOTE — PROGRESS NOTES
[facility] HEMATOLOGY & ONCOLOGY  [address]  [facility] [address] [phone]    Patient Name: Adriana Samuels  Encounter Date: 11/21/2024  YOB: 1955  Patient Number: [id number]      REASON FOR FOLLOW-UP: Ms. Adriana Samuels is a pleasant 69 year-old  female, post menopausal, who is seen on followup for Stage IIA multicentric right breast cancer of the upper outer quadrant, lower outer quadrant, receptor positive.  She is on therapy with adjuvant letrozole on 3/6/2024 through present.   She is seen 69.75 months from 4 cycles of adjuvant docetaxel and cyclophosphamide from previous stage IIa right breast cancer in 2018. She is seen alone.  Patient is a reliable historian.      Pertinent history.  1.   Elevated CEA, history of.    2.   Rash from Decadron.  3.  Carcinoma of the right upper outer breast. Invasive ductal carcinoma:  Stage: AJCC IIA (pT1b, pN1a, M0, G2)   Tumor size 9 mm.  Hormone Status: %, AK 86%, and negative HER-2/gregory.  Baseline Node Status: 1/2 sentinel node positive for metastasis.   Complications of Tumor: None.   Treatment status: Post adjuvant docetaxel and cyclophosphamide.   Post adjuvant radiation 02/2019.   Declined adjuvant Femara.   Prognosis: Fair.  4.  Ductal carcinoma in situ, left, postmastectomy.    Oncology/Hematology History Overview Note   DIAGNOSTIC ABNORMALITIES:  Mammogram 11/3/2023.  Suspicious microcalcifications upper outer left breast. Biopsy is recommended. BI-RADS 4. Stable postoperative changes of the right breast.    Sonogram guided biopsy 11/8/2023.  Sonography demonstrates a hypoechoic lesion with irregular margins in the upper outer quadrant of the left breast corresponding to the lesion noted on recent mammography. A total of 6 vacuum assisted core specimens were obtained the majority of which contain calcifications. A HydroMARK clip was left in place.  Also  "demonstrated was a 3 x 4 mm solid nodule with lobular margins approximate 1.2 cm medial to the larger lesion within the left breast. This was biopsied using a Monopty biopsy gun with a total of 6 core specimens obtained. This portion of the biopsy was somewhat difficult as the nodule was noted to well superiorly and inferiorly upon attempted firing of the instrument. There were at least 2 biopsies which I feel penetrated the central aspect of the lesion. A true michael X clip was left adjacent to this nodule.  Pathology report 11/13/2023.  Left breast, designated \"mass/distortion 2:00\", 4 cm from the nipple, biopsy:Invasive carcinoma of the breast, no special type, with areas of lobular pattern growth and the following features: Modified [place] grade 2 (score 3 for tubules, score 2 for nuclear grade, score 1 for mitoses).Lymphovascular space invasion: Not identified.Ductal carcinoma in situ: Present, solid and cribriform growth patterns, intermediate nuclear grade with single cell necrosis and calcifications. Extent: Tumor involves 6 of 6 cores, largest span 14 mm. Additional findings: Calcifications associated with invasive carcinoma. Left breast, designated \"mass 2:00\", 3 cm from the nipple, biopsy:  Invasive carcinoma of the breast, no special type, with the following features: Modified [place] grade 2 (score 3 for tubules, score 2 for nuclear grade, score 1 for mitoses).Lymphovascular space invasion: Not identified.Ductal carcinoma in situ: Not identified.Extent: 3 of 6 fragmented cores, largest span 2 mm.    Pathology report 11/22/2023.1.  Right breast, mastectomy: Nonneoplastic breast with prior surgical site identified.No residual/recurrent tumor identified.  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 Associated intermediate grade ductal carcinoma in situ, solid and cribriform type.Surgical resection margins free of invasive and in situ carcinoma.AJCC " pathologic stage:  pT1c(m) N0(sn).            PREVIOUS INTERVENTIONS:  Patient had undergone bilateral mastectomy with sentinel lymph node resection from the left axilla on 11/21/2023.  Adjuvant letrozole on 3/6/2024 through present.          DIAGNOSTIC ABNORMALITIES:   The patient had a mammogram from [facility] that showed a lesion 5.6 mm at the 10 o'clock position also noted on ultrasound suspicious for malignancy. The patient was referred for nodule in the right upper outer breast.  The patient was seen by Dr. Lawrence 09/05/2018.  Examination showed small palpable abnormality at 10 and 11 o'clock positions, 0.5 cm to 1 cm in size. No adenopathy noted. Patient planned for excision and sentinel node evaluation.  Pathology report 09/11/2018: Invasive ductal carcinoma intermediate grade, 1 out of 2 sentinel lymph nodes positive for metastasis. The largest metastatic focus is 2.5 mm. Negative for extracapsular extension. Tumor measures 9 mm in greatest linear dimension. Margins of resection are negative. Tumor measures 2 mm from closest anterior margin. AJCC T1b, N1a. ER/AR positive and HER-2/gregory negative.  CT abdomen and pelvis performed at [facility] on 09/21/2018 was revealing for IMPRESSION: 1. Post therapy changes right breast and axilla. 2. Linear nodularity left lung base, suspect chronic changes. 3. Otherwise, No CT evidence of definite metastatic disease in chest or acute cardiopulmonary process.   Bone scan performed at [facility] on 09/21/2018 was revealing for IMPRESSION: 1. Focal increased tracer activity identified in the left lateral mid-cervical spine, suspect degenerative changes. Plain radiographic imaging could further characterize. 2. Otherwise, No scintigraphic evidence of osseous metastases.   Ultrasound of liver performed at [facility] on 09/26/2018 was revealing for Summary:  Multiple hepatic cysts.      PREVIOUS INTERVENTIONS:   The patient had undergone right breast  lumpectomy with sentinel lymph node biopsy 09/10/2018 by Dr. Lawrence.  Adjuvant Taxotere and Cytoxan 09/25/2018 through 11/27/2018 at [facility]. 4 cycles.   Adjuvant radiation completed 02/2019 by Dr. Williamson.  Adjuvant Femara 2.5 mg po daily, she declined.            Malignant neoplasm of upper-outer quadrant of right female breast    Initial Diagnosis    Malignant neoplasm of right female breast (CMS/HCC)     9/10/2018 Surgery    Final Diagnosis   1.  Breast, right, lumpectomy and sentinel lymph node resection:  Invasive ductal carcinoma, intermediate grade  Tumor measures 9 mm in greatest linear dimension  Margins of resection are negative  Tumor measures 2 mm from the closest, anterior, margin.     Positive for metastatic ductal carcinoma in one out of 2 sentinel lymph nodes (1/2)  The largest metastatic focus is 2.5 mm  Negative for extra capsular extension     AJCC cancer stage: pT1b, (sn)pN1a            9/21/2018 Imaging    Ct Chest With Contrast  1. Post therapy changes right breast and axilla.   2. Linear nodularity left lung base, suspect chronic changes.   3. Otherwise, No CT evidence of definite metastatic disease in chest or acute cardiopulmonary process.     Nm Bone Scan Whole Body  1. Focal increased tracer activity identified in the left lateral mid cervical spine, suspect degenerative changes. Plain radiographic imaging could further characterize.   2. Otherwise, No scintigraphic evidence of osseous metastases.     Ct Abdomen Pelvis With Contrast  1. Multiple low-attenuation hepatic lesions, hepatic cyst most likely.   2. Suspect uterine fibroid   3. Otherwise, No CT evidence of metastatic disease or acute intra-abdominal/pelvic pathological process.      9/25/2018 - 11/27/2018 Chemotherapy    Taxotere Cytoxan     1/15/2019 - 2/25/2019 Radiation    Radiation OncologyTreatment Course:  Adriana Samuels received 6000 cGy in 30 fractions to right breast, right supraclavicular area, and right  axilla via external beam radiation therapy.     10/28/2019 Imaging    Bilateral Diagnostic Mammogram:  IMPRESSION:  Posttherapeutic changes of the right breast are stable over  a one year time period and no suspicious findings are identified. The  patient is due to return in 12 months for bilateral digital mammograms.  BI-RADS Category 2, benign.     10/29/2020 Imaging    Bilateral Diagnostic Mammogram:  IMPRESSION:  1. No mammographic evidence of malignancy.  2. BI-RADS CATEGORY 2: Benign findings.  3. Recommend annual screening mammography.     Malignant neoplasm of upper-outer quadrant of left breast in female, estrogen receptor positive   1/25/2024 Initial Diagnosis    Malignant neoplasm of upper-outer quadrant of left breast in female, estrogen receptor positive     1/25/2024 Cancer Staged    Staging form: Breast, AJCC 8th Edition  - Pathologic stage from 1/25/2024: Stage IA (pT1c, pN0, cM0, G2, ER+, VA+, HER2-) - Signed by Nahid Linton MD on 1/25/2024         PAST MEDICAL HISTORY:  ALLERGIES:  Allergies   Allergen Reactions    Bactrim [Sulfamethoxazole-Trimethoprim] Rash    Cortisone Rash    Decadron [Dexamethasone] Hives    Hydrocortisone Hives     Oral or IV    Clindamycin/Lincomycin Hives    Erythromycin Nausea Only and Rash     CURRENT MEDICATIONS:  Outpatient Encounter Medications as of 11/21/2024   Medication Sig Dispense Refill    aspirin 81 MG chewable tablet Chew 1 tablet Daily.      Biotin 1 MG capsule Take 1 capsule by mouth Daily.      Calcium Carb-Cholecalciferol (CALCIUM 1000 + D PO) Take  by mouth Daily.      glucosamine sulfate 500 MG capsule capsule Take  by mouth 3 (Three) Times a Day With Meals.      letrozole (FEMARA) 2.5 MG tablet Take 1 tablet by mouth Daily. 90 tablet 3    Multiple Vitamins-Minerals (MULTIVITAMIN WITH MINERALS) tablet tablet Take 1 tablet by mouth Daily.      Probiotic Product (PROBIOTIC PO) Take 1 capsule by mouth Daily.       No facility-administered encounter  medications on file as of 11/21/2024.     ADULT ILLNESSES:  Patient Active Problem List   Diagnosis Code    Malignant neoplasm of upper-outer quadrant of right female breast C50.411    Non-smoker Z78.9    S/P lumpectomy, right breast Z98.890    S/P lymph node biopsy Z98.890    History of radiation therapy Z92.3    Prophylactic use of letrozole (Femara) Z79.811    Screening mammogram for high-risk patient Z12.31    History of left breast cancer Z85.3    Malignant neoplasm of upper-outer quadrant of left breast in female, estrogen receptor positive C50.412, Z17.0     SURGERIES:  Past Surgical History:   Procedure Laterality Date    BREAST BIOPSY Right 09/2018    positive    BREAST LUMPECTOMY Right 09/2018    lumpectomy with chemo and rad tx    BREAST LUMPECTOMY WITH SENTINEL NODE BIOPSY Right 09/10/2018    Procedure: RIGHT BREAST LUMPECTOMY WITH SENTINEL LYMPH NODE BIOPSY, INJECTION AND SCAN;  Surgeon: Duglas Lawrence MD;  Location: [facility] OR;  Service: General    COLONOSCOPY N/A 06/06/2019    Procedure: COLONOSCOPY WITH ANESTHESIA;  Surgeon: Brock Raman MD;  Location: [facility] ENDOSCOPY;  Service: Gastroenterology    HYDROCELE EXCISION / REPAIR      LASER ABLATION OF THE CERVIX      MASTECTOMY WITH SENTINEL NODE BIOPSY AND AXILLARY NODE DISSECTION Bilateral 11/21/2023    Procedure: BREAST MASTECTOMY WITH left SENTINEL NODE BIOPSY AND AXILLARY NODE DISSECTION BILATERAL;  Surgeon: Duglas Lawrence MD;  Location: [facility] OR;  Service: General;  Laterality: Bilateral;    WRIST FUSION Left      HEALTH MAINTENANCE ITEMS:  Health Maintenance Due   Topic Date Due    TDAP/TD VACCINES (1 - Tdap) Never done    ZOSTER VACCINE (1 of 2) Never done    HEPATITIS C SCREENING  Never done    ANNUAL WELLNESS VISIT  Never done    Pneumococcal Vaccine 65+ (1 of 1 - PCV) Never done    INFLUENZA VACCINE  08/01/2024    COVID-19 Vaccine (6 - 2024-25 season) 09/01/2024       <no information>  Last Completed Colonoscopy             COLORECTAL CANCER SCREENING (COLONOSCOPY - Every 10 Years) Next due on 6/6/2029 06/06/2019  Surgical Procedure: COLONOSCOPY    06/06/2019  COLONOSCOPY                  Immunization History   Administered Date(s) Administered    COVID-19 (PFIZER) Purple Cap Monovalent 03/25/2021, 04/15/2021, 12/15/2021     Last Completed Mammogram            Discontinued - MAMMOGRAM  Discontinued        Frequency changed to Never automatically (Topic No Longer Applies)    11/03/2023  Mammo Diagnostic Digital Tomosynthesis Bilateral With CAD    11/02/2022  Mammo Diagnostic Digital Tomosynthesis Bilateral With CAD    11/01/2021  Mammo Diagnostic Digital Tomosynthesis Bilateral With CAD    10/29/2020  Mammo Diagnostic Digital Tomosynthesis Bilateral With CAD    Only the first 5 history entries have been loaded, but more history exists.                      FAMILY HISTORY:  Family History   Problem Relation Age of Onset    No Known Problems Mother     Skin cancer Father     No Known Problems Sister     No Known Problems Brother     No Known Problems Sister     No Known Problems Brother     No Known Problems Son     No Known Problems Son     No Known Problems Son     No Known Problems Daughter     No Known Problems Maternal Grandmother     No Known Problems Paternal Grandmother     No Known Problems Maternal Aunt     No Known Problems Paternal Aunt     No Known Problems Other     Breast cancer Neg Hx     Colon cancer Neg Hx     Colon polyps Neg Hx     BRCA 1/2 Neg Hx     Endometrial cancer Neg Hx     Ovarian cancer Neg Hx      SOCIAL HISTORY:  Social History     Socioeconomic History    Marital status:    Tobacco Use    Smoking status: Never     Passive exposure: Never    Smokeless tobacco: Never   Vaping Use    Vaping status: Never Used   Substance and Sexual Activity    Alcohol use: Not Currently     Comment: Rare occasions in past socially    Drug use: No    Sexual activity: Yes     Partners: Male     Birth  "control/protection: Post-menopausal     Comment: Spouse only       REVIEW OF SYSTEMS:    Review of Systems   Constitutional:  Negative for fatigue, fever and unexpected weight change.        \"I feel okay.\"   HENT:  Positive for congestion. Negative for ear pain.    Eyes:  Negative for discharge and redness.   Respiratory:  Negative for shortness of breath and wheezing.    Cardiovascular:  Negative for chest pain and palpitations.   Gastrointestinal:  Negative for abdominal pain, nausea and vomiting.   Endocrine: Negative for cold intolerance and heat intolerance.   Genitourinary:  Negative for difficulty urinating and dysuria.   Musculoskeletal:  Negative for gait problem and myalgias.   Skin:  Negative for pallor.   Allergic/Immunologic: Negative for food allergies.   Neurological:  Negative for speech difficulty and weakness.        She had dizzy spell last Sunday. \"I am okay now.\"   Hematological:  Negative for adenopathy. Does not bruise/bleed easily.   Psychiatric/Behavioral:  Negative for agitation, confusion and hallucinations.        VITAL SIGNS: /62   Pulse 86   Temp 98.2 °F (36.8 °C)   Resp 16   Ht 167.6 cm (66\")   Wt 57.1 kg (125 lb 14.4 oz)   SpO2 97%   Breastfeeding No   BMI 20.32 kg/m²   Pain Score    11/21/24 0834   PainSc: 0-No pain       PHYSICAL EXAMINATION:     Physical Exam  Vitals reviewed.   Constitutional:       General: She is not in acute distress.  HENT:      Head: Normocephalic and atraumatic.   Eyes:      General: No scleral icterus.  Cardiovascular:      Rate and Rhythm: Normal rate.   Pulmonary:      Effort: No respiratory distress.      Breath sounds: No wheezing.      Comments: Bilateral mastectomy scars. No mas palpable. Examined with Sophia.   Abdominal:      General: Bowel sounds are normal.      Palpations: Abdomen is soft.      Tenderness: There is no abdominal tenderness.   Musculoskeletal:         General: No swelling.      Cervical back: Neck supple.   Skin:     " Coloration: Skin is not pale.   Neurological:      Mental Status: She is alert and oriented to person, place, and time.   Psychiatric:         Mood and Affect: Mood normal.         Behavior: Behavior normal.         Thought Content: Thought content normal.         Judgment: Judgment normal.         LABS    Lab Results - Last 18 Months   Lab Units 11/14/24  0817 08/15/24  0849 05/16/24  0801 02/22/24  0759 11/16/23  1207 10/12/23  0813   HEMOGLOBIN g/dL 13.5 13.0 13.1 13.7 12.7 13.7   HEMATOCRIT % 41.7 39.9 41.6 43.4 40.8 45.0   MCV fL 86.2 85.4 87.0 87.0 88.1 88.1   WBC 10*3/mm3 3.66 4.33 3.92 3.71 5.30 3.80   RDW % 13.2 13.2 13.5 12.9 13.3 13.2   MPV fL 9.7 9.7 9.8 9.8 9.9 9.8   PLATELETS 10*3/mm3 200 211 212 212 225 208   IMM GRAN % % 0.3 0.2 0.3 0.0  --  0.3   NEUTROS ABS 10*3/mm3 1.88 2.41 2.23 1.99  --  2.07   LYMPHS ABS 10*3/mm3 1.21 1.21 1.07 1.07  --  1.14   MONOS ABS 10*3/mm3 0.40 0.43 0.35 0.41  --  0.39   EOS ABS 10*3/mm3 0.12 0.21 0.20 0.19  --  0.14   BASOS ABS 10*3/mm3 0.04 0.06 0.06 0.05  --  0.05   IMMATURE GRANS (ABS) 10*3/mm3 0.01 0.01 0.01 0.00  --  0.01   NRBC /100 WBC 0.0 0.0 0.0 0.0  --  0.0       Lab Results - Last 18 Months   Lab Units 11/14/24  0817 08/15/24  0849 05/16/24  0801 02/22/24  0759 11/16/23  1207 10/12/23  0813   GLUCOSE mg/dL 91 96 74 88 108* 85   SODIUM mmol/L 143 142 143 143 143 142   POTASSIUM mmol/L 4.5 4.5 4.6 4.3 4.4 4.5   CO2 mmol/L 29.0 29.0 32.0* 32.0* 31.0* 29.0   CHLORIDE mmol/L 104 104 105 104 106 104   ANION GAP mmol/L 10.0 9.0 6.0 7.0 6.0 9.0   CREATININE mg/dL 0.60 0.61 0.59 0.67 0.61 0.67   BUN mg/dL 14 15 16 14 15 12   BUN / CREAT RATIO  23.3 24.6 27.1* 20.9 24.6 17.9   CALCIUM mg/dL 9.6 9.5 9.4 9.6 9.1 9.7   ALK PHOS U/L 76 75 75 78  --  75   TOTAL PROTEIN g/dL 6.7 7.0 6.9 7.2  --  7.1   ALT (SGPT) U/L 12 10 11 9  --  10   AST (SGOT) U/L 19 17 16 16  --  17   BILIRUBIN mg/dL 0.5 0.6 0.5 0.6  --  0.6   ALBUMIN g/dL 4.3 4.4 4.5 4.5  --  4.5   GLOBULIN gm/dL 2.4  "2.6 2.4 2.7  --  2.6       Lab Results - Last 18 Months   Lab Units 11/14/24  0817 08/15/24  0849 05/16/24  0801 02/22/24  0759 10/12/23  0813   CEA ng/mL 1.99 1.91 1.92 2.03 2.22       No results for input(s): \"IRON\", \"TIBC\", \"LABIRON\", \"FERRITIN\", \"Y3ISUVH\", \"TSH\", \"FOLATE\" in the last 91987 hours.    Invalid input(s): \"VITB12\"    Adriana Samuels reports a pain score of 0.           ASSESSMENT:  1.   Left breast cancer, upper outer quadrant, lower outer quadrant, multicentric. Tumor size 1.7 cm followed by 10 mm and 8 mm.  No lymphovascular invasion.  Negative margins. Oncotype DX score,  Less than 1% benefit for chemo.  3% distant recurrence risk at 9 years.  AJCC stage: 1a (pT1c, psnN0, cM0, G2).  Receptor status: Estrogen 95%, progesterone 84% and HER2/gregory negative by FISH.  Low Ki-67.  Treatment status:Patient had undergone bilateral mastectomy with sentinel lymph node resection from the left axilla on 11/21/2023.  Patient is taking adjuvant letrozole from 3/6/2024 through present.    2.   Performance status of 0.   3.   Self directed care.   4.   Osteopenia 11/3/2023.  She is on Caltrate D.              PLAN:  1.    Re: Tolerance to adjuvant letrozole. \"Not happy taking it. It's made in [place].\"  2.    Re:  Heme status. WBC 3.6, hemoglobin 13.5 and platelet 200.  3.    Re:  Pre-office CMP.  GFR 96.7 ml/min and CO2 at 29.  4.    Re:  Pre-office CEA  1.99 from 1.91 from 1.92 from 2.03 from 2.22.  5.    Re:  Pre-office CA27-29 19 from 15.5 from 12.7 from 13.8 from 14.  6.   Refer to genetic counseling for genetic testing. \"No, I don't want to know.\"  7.   eRx for letrozole 2.5 mg p.o. daily, 60, 3 refills if needed.  Monitor for hot flashes, weight gain hair thinning, arthralgias, or worsening bone loss. Plan for 5 years.   8.   Continue ongoing management per primary care physician and the other specialists.  9.  Plan of care discussed with patient.  Understanding expressed.  " She is agreeable to proceed.  10.  Continue Caltrate D twice daily secondary to osteopenia.  11.  Schedule next bone density 11/2025.   12.  No further mammograms, patient had bilateral mastectomy.   13.  She will be seen 1-4 times per year for 5 years.  14.  Return to the office in 3 months with CBC with differential, CMP, CEA and CA 27-29, use right arm, same day.   15.  Advance Care Planning  ACP discussion was held with the patient during this visit. Patient has an advance directive in EMR which is still valid.             I have reviewed the assessment and plan and verified the accuracy of it. No changes to assessment and plan since the information was documented. Nahid Linton MD 11/21/24            I spent 30 total minutes, face-to-face, caring for Adriana today. Greater than 50% of this time involved counseling and/or coordination of care as documented within this note.             cc: Arie Renteria MD        (Jonathan Williamson MD)

## 2024-11-14 ENCOUNTER — LAB (OUTPATIENT)
Dept: LAB | Facility: HOSPITAL | Age: 69
End: 2024-11-14
Payer: MEDICARE

## 2024-11-14 DIAGNOSIS — Z17.0 MALIGNANT NEOPLASM OF UPPER-OUTER QUADRANT OF RIGHT BREAST IN FEMALE, ESTROGEN RECEPTOR POSITIVE: ICD-10-CM

## 2024-11-14 DIAGNOSIS — C50.411 MALIGNANT NEOPLASM OF UPPER-OUTER QUADRANT OF RIGHT BREAST IN FEMALE, ESTROGEN RECEPTOR POSITIVE: ICD-10-CM

## 2024-11-14 LAB
ALBUMIN SERPL-MCNC: 4.3 G/DL (ref 3.5–5.2)
ALBUMIN/GLOB SERPL: 1.8 G/DL
ALP SERPL-CCNC: 76 U/L (ref 39–117)
ALT SERPL W P-5'-P-CCNC: 12 U/L (ref 1–33)
ANION GAP SERPL CALCULATED.3IONS-SCNC: 10 MMOL/L (ref 5–15)
AST SERPL-CCNC: 19 U/L (ref 1–32)
BASOPHILS # BLD AUTO: 0.04 10*3/MM3 (ref 0–0.2)
BASOPHILS NFR BLD AUTO: 1.1 % (ref 0–1.5)
BILIRUB SERPL-MCNC: 0.5 MG/DL (ref 0–1.2)
BUN SERPL-MCNC: 14 MG/DL (ref 8–23)
BUN/CREAT SERPL: 23.3 (ref 7–25)
CALCIUM SPEC-SCNC: 9.6 MG/DL (ref 8.6–10.5)
CEA SERPL-MCNC: 1.99 NG/ML
CHLORIDE SERPL-SCNC: 104 MMOL/L (ref 98–107)
CO2 SERPL-SCNC: 29 MMOL/L (ref 22–29)
CREAT SERPL-MCNC: 0.6 MG/DL (ref 0.57–1)
DEPRECATED RDW RBC AUTO: 41.8 FL (ref 37–54)
EGFRCR SERPLBLD CKD-EPI 2021: 96.7 ML/MIN/1.73
EOSINOPHIL # BLD AUTO: 0.12 10*3/MM3 (ref 0–0.4)
EOSINOPHIL NFR BLD AUTO: 3.3 % (ref 0.3–6.2)
ERYTHROCYTE [DISTWIDTH] IN BLOOD BY AUTOMATED COUNT: 13.2 % (ref 12.3–15.4)
GLOBULIN UR ELPH-MCNC: 2.4 GM/DL
GLUCOSE SERPL-MCNC: 91 MG/DL (ref 65–99)
HCT VFR BLD AUTO: 41.7 % (ref 34–46.6)
HGB BLD-MCNC: 13.5 G/DL (ref 12–15.9)
IMM GRANULOCYTES # BLD AUTO: 0.01 10*3/MM3 (ref 0–0.05)
IMM GRANULOCYTES NFR BLD AUTO: 0.3 % (ref 0–0.5)
LYMPHOCYTES # BLD AUTO: 1.21 10*3/MM3 (ref 0.7–3.1)
LYMPHOCYTES NFR BLD AUTO: 33.1 % (ref 19.6–45.3)
MCH RBC QN AUTO: 27.9 PG (ref 26.6–33)
MCHC RBC AUTO-ENTMCNC: 32.4 G/DL (ref 31.5–35.7)
MCV RBC AUTO: 86.2 FL (ref 79–97)
MONOCYTES # BLD AUTO: 0.4 10*3/MM3 (ref 0.1–0.9)
MONOCYTES NFR BLD AUTO: 10.9 % (ref 5–12)
NEUTROPHILS NFR BLD AUTO: 1.88 10*3/MM3 (ref 1.7–7)
NEUTROPHILS NFR BLD AUTO: 51.3 % (ref 42.7–76)
NRBC BLD AUTO-RTO: 0 /100 WBC (ref 0–0.2)
PLATELET # BLD AUTO: 200 10*3/MM3 (ref 140–450)
PMV BLD AUTO: 9.7 FL (ref 6–12)
POTASSIUM SERPL-SCNC: 4.5 MMOL/L (ref 3.5–5.2)
PROT SERPL-MCNC: 6.7 G/DL (ref 6–8.5)
RBC # BLD AUTO: 4.84 10*6/MM3 (ref 3.77–5.28)
SODIUM SERPL-SCNC: 143 MMOL/L (ref 136–145)
WBC NRBC COR # BLD AUTO: 3.66 10*3/MM3 (ref 3.4–10.8)

## 2024-11-14 PROCEDURE — 85025 COMPLETE CBC W/AUTO DIFF WBC: CPT

## 2024-11-14 PROCEDURE — 82378 CARCINOEMBRYONIC ANTIGEN: CPT

## 2024-11-14 PROCEDURE — 80053 COMPREHEN METABOLIC PANEL: CPT

## 2024-11-14 PROCEDURE — 36415 COLL VENOUS BLD VENIPUNCTURE: CPT

## 2024-11-14 PROCEDURE — 86300 IMMUNOASSAY TUMOR CA 15-3: CPT

## 2024-11-15 LAB — CANCER AG27-29 SERPL-ACNC: 19 U/ML (ref 0–38.6)

## 2024-11-21 ENCOUNTER — OFFICE VISIT (OUTPATIENT)
Dept: ONCOLOGY | Facility: CLINIC | Age: 69
End: 2024-11-21
Payer: MEDICARE

## 2024-11-21 VITALS
OXYGEN SATURATION: 97 % | SYSTOLIC BLOOD PRESSURE: 110 MMHG | DIASTOLIC BLOOD PRESSURE: 62 MMHG | HEART RATE: 86 BPM | WEIGHT: 125.9 LBS | HEIGHT: 66 IN | BODY MASS INDEX: 20.23 KG/M2 | RESPIRATION RATE: 16 BRPM | TEMPERATURE: 98.2 F

## 2024-11-21 DIAGNOSIS — Z17.0 MALIGNANT NEOPLASM OF UPPER-OUTER QUADRANT OF RIGHT BREAST IN FEMALE, ESTROGEN RECEPTOR POSITIVE: Primary | ICD-10-CM

## 2024-11-21 DIAGNOSIS — C50.411 MALIGNANT NEOPLASM OF UPPER-OUTER QUADRANT OF RIGHT BREAST IN FEMALE, ESTROGEN RECEPTOR POSITIVE: Primary | ICD-10-CM

## 2025-02-13 ENCOUNTER — TELEPHONE (OUTPATIENT)
Dept: ONCOLOGY | Facility: CLINIC | Age: 70
End: 2025-02-13
Payer: MEDICARE

## 2025-02-13 ENCOUNTER — LAB (OUTPATIENT)
Dept: LAB | Facility: HOSPITAL | Age: 70
End: 2025-02-13
Payer: MEDICARE

## 2025-02-13 DIAGNOSIS — C50.411 MALIGNANT NEOPLASM OF UPPER-OUTER QUADRANT OF RIGHT BREAST IN FEMALE, ESTROGEN RECEPTOR POSITIVE: ICD-10-CM

## 2025-02-13 DIAGNOSIS — Z17.0 MALIGNANT NEOPLASM OF UPPER-OUTER QUADRANT OF RIGHT BREAST IN FEMALE, ESTROGEN RECEPTOR POSITIVE: ICD-10-CM

## 2025-02-13 LAB
ALBUMIN SERPL-MCNC: 4.4 G/DL (ref 3.5–5.2)
ALBUMIN/GLOB SERPL: 1.8 G/DL
ALP SERPL-CCNC: 75 U/L (ref 39–117)
ALT SERPL W P-5'-P-CCNC: 13 U/L (ref 1–33)
ANION GAP SERPL CALCULATED.3IONS-SCNC: 12 MMOL/L (ref 5–15)
AST SERPL-CCNC: 20 U/L (ref 1–32)
BASOPHILS # BLD AUTO: 0.05 10*3/MM3 (ref 0–0.2)
BASOPHILS NFR BLD AUTO: 1.3 % (ref 0–1.5)
BILIRUB SERPL-MCNC: 0.6 MG/DL (ref 0–1.2)
BUN SERPL-MCNC: 17 MG/DL (ref 8–23)
BUN/CREAT SERPL: 32.7 (ref 7–25)
CALCIUM SPEC-SCNC: 9.3 MG/DL (ref 8.6–10.5)
CEA SERPL-MCNC: 2.07 NG/ML
CHLORIDE SERPL-SCNC: 101 MMOL/L (ref 98–107)
CO2 SERPL-SCNC: 29 MMOL/L (ref 22–29)
CREAT SERPL-MCNC: 0.52 MG/DL (ref 0.57–1)
DEPRECATED RDW RBC AUTO: 40.5 FL (ref 37–54)
EGFRCR SERPLBLD CKD-EPI 2021: 100.1 ML/MIN/1.73
EOSINOPHIL # BLD AUTO: 0.2 10*3/MM3 (ref 0–0.4)
EOSINOPHIL NFR BLD AUTO: 5.3 % (ref 0.3–6.2)
ERYTHROCYTE [DISTWIDTH] IN BLOOD BY AUTOMATED COUNT: 12.9 % (ref 12.3–15.4)
GLOBULIN UR ELPH-MCNC: 2.5 GM/DL
GLUCOSE SERPL-MCNC: 56 MG/DL (ref 65–99)
HCT VFR BLD AUTO: 40.5 % (ref 34–46.6)
HGB BLD-MCNC: 13 G/DL (ref 12–15.9)
IMM GRANULOCYTES # BLD AUTO: 0 10*3/MM3 (ref 0–0.05)
IMM GRANULOCYTES NFR BLD AUTO: 0 % (ref 0–0.5)
LYMPHOCYTES # BLD AUTO: 1.03 10*3/MM3 (ref 0.7–3.1)
LYMPHOCYTES NFR BLD AUTO: 27.2 % (ref 19.6–45.3)
MCH RBC QN AUTO: 27.5 PG (ref 26.6–33)
MCHC RBC AUTO-ENTMCNC: 32.1 G/DL (ref 31.5–35.7)
MCV RBC AUTO: 85.6 FL (ref 79–97)
MONOCYTES # BLD AUTO: 0.39 10*3/MM3 (ref 0.1–0.9)
MONOCYTES NFR BLD AUTO: 10.3 % (ref 5–12)
NEUTROPHILS NFR BLD AUTO: 2.11 10*3/MM3 (ref 1.7–7)
NEUTROPHILS NFR BLD AUTO: 55.9 % (ref 42.7–76)
NRBC BLD AUTO-RTO: 0 /100 WBC (ref 0–0.2)
PLATELET # BLD AUTO: 200 10*3/MM3 (ref 140–450)
PMV BLD AUTO: 9.6 FL (ref 6–12)
POTASSIUM SERPL-SCNC: 4.5 MMOL/L (ref 3.5–5.2)
PROT SERPL-MCNC: 6.9 G/DL (ref 6–8.5)
RBC # BLD AUTO: 4.73 10*6/MM3 (ref 3.77–5.28)
SODIUM SERPL-SCNC: 142 MMOL/L (ref 136–145)
WBC NRBC COR # BLD AUTO: 3.78 10*3/MM3 (ref 3.4–10.8)

## 2025-02-13 PROCEDURE — 85025 COMPLETE CBC W/AUTO DIFF WBC: CPT

## 2025-02-13 PROCEDURE — 80053 COMPREHEN METABOLIC PANEL: CPT

## 2025-02-13 PROCEDURE — 86300 IMMUNOASSAY TUMOR CA 15-3: CPT

## 2025-02-13 PROCEDURE — 82378 CARCINOEMBRYONIC ANTIGEN: CPT

## 2025-02-13 PROCEDURE — 36415 COLL VENOUS BLD VENIPUNCTURE: CPT

## 2025-02-13 NOTE — TELEPHONE ENCOUNTER
----- Message from Nahid Linton sent at 2/13/2025  8:46 AM CST -----  Notify patient, low glucose 56.

## 2025-02-14 LAB — CANCER AG27-29 SERPL-ACNC: 20.2 U/ML (ref 0–38.6)

## 2025-02-20 DIAGNOSIS — C50.411 MALIGNANT NEOPLASM OF UPPER-OUTER QUADRANT OF RIGHT BREAST IN FEMALE, ESTROGEN RECEPTOR POSITIVE: Primary | ICD-10-CM

## 2025-02-20 DIAGNOSIS — Z17.0 MALIGNANT NEOPLASM OF UPPER-OUTER QUADRANT OF RIGHT BREAST IN FEMALE, ESTROGEN RECEPTOR POSITIVE: Primary | ICD-10-CM

## 2025-02-20 RX ORDER — LETROZOLE 2.5 MG/1
2.5 TABLET, FILM COATED ORAL DAILY
Qty: 90 TABLET | Refills: 3 | Status: SHIPPED | OUTPATIENT
Start: 2025-02-20

## 2025-02-20 NOTE — PROGRESS NOTES
[facility] HEMATOLOGY & ONCOLOGY  [address]  [facility] [address] [phone]    Patient Name: Adriana Samuels  Encounter Date: 03/03/2025  YOB: 1955  Patient Number: [id number]      REASON FOR FOLLOW-UP: Ms. Adriana Samuels is a pleasant 69 year-old  female, post menopausal, who is seen on followup for Stage IIA left breast carcinoma, multicentric, upper outer quadrant and right lower outer quadrant, hormone receptor positive.  Patient on therapy with adjuvant letrozole on 3/6/2024 through present.   She is seen 75.25 months from 4 cycles of adjuvant docetaxel and cyclophosphamide from previous stage IIa right breast cancer in 2018. She is seen alone.  History is considered reliable.         Pertinent history.  1.   Elevated CEA, history of.    2.   Rash from Decadron.  3.  Carcinoma of the right upper outer breast. Invasive ductal carcinoma:  Stage: AJCC IIA (pT1b, pN1a, M0, G2)   Tumor size 9 mm.  Hormone Status: %, WA 86%, and negative HER-2/gregory.  Baseline Node Status: 1/2 sentinel node positive for metastasis.   Complications of Tumor: None.   Treatment status: Post adjuvant docetaxel and cyclophosphamide.   Post adjuvant radiation 02/2019.   Declined adjuvant Femara.   Prognosis: Fair.  4.  Ductal carcinoma in situ, left, postmastectomy.          Oncology/Hematology History Overview Note   DIAGNOSTIC ABNORMALITIES:  Mammogram 11/3/2023.  Suspicious microcalcifications upper outer left breast. Biopsy is recommended. BI-RADS 4. Stable postoperative changes of the right breast.    Sonogram guided biopsy 11/8/2023.  Sonography demonstrates a hypoechoic lesion with irregular margins in the upper outer quadrant of the left breast corresponding to the lesion noted on recent mammography. A total of 6 vacuum assisted core specimens were obtained the majority of which contain calcifications. A HydroMARK clip was left in  "place.  Also demonstrated was a 3 x 4 mm solid nodule with lobular margins approximate 1.2 cm medial to the larger lesion within the left breast. This was biopsied using a Monopty biopsy gun with a total of 6 core specimens obtained. This portion of the biopsy was somewhat difficult as the nodule was noted to well superiorly and inferiorly upon attempted firing of the instrument. There were at least 2 biopsies which I feel penetrated the central aspect of the lesion. A true michael X clip was left adjacent to this nodule.  Pathology report 11/13/2023.  Left breast, designated \"mass/distortion 2:00\", 4 cm from the nipple, biopsy:Invasive carcinoma of the breast, no special type, with areas of lobular pattern growth and the following features: Modified [place] grade 2 (score 3 for tubules, score 2 for nuclear grade, score 1 for mitoses).Lymphovascular space invasion: Not identified.Ductal carcinoma in situ: Present, solid and cribriform growth patterns, intermediate nuclear grade with single cell necrosis and calcifications. Extent: Tumor involves 6 of 6 cores, largest span 14 mm. Additional findings: Calcifications associated with invasive carcinoma. Left breast, designated \"mass 2:00\", 3 cm from the nipple, biopsy:  Invasive carcinoma of the breast, no special type, with the following features: Modified Graciela grade 2 (score 3 for tubules, score 2 for nuclear grade, score 1 for mitoses).Lymphovascular space invasion: Not identified.Ductal carcinoma in situ: Not identified.Extent: 3 of 6 fragmented cores, largest span 2 mm.    Pathology report 11/22/2023.1.  Right breast, mastectomy: Nonneoplastic breast with prior surgical site identified.No residual/recurrent tumor identified.  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 Associated intermediate grade ductal carcinoma in situ, solid and cribriform type.Surgical resection margins free of invasive and in situ " carcinoma.AJCC pathologic stage:  pT1c(m) N0(sn).            PREVIOUS INTERVENTIONS:  Patient had undergone bilateral mastectomy with sentinel lymph node resection from the left axilla on 11/21/2023.  Adjuvant letrozole on 3/6/2024 through present.          DIAGNOSTIC ABNORMALITIES:   The patient had a mammogram from [facility] that showed a lesion 5.6 mm at the 10 o'clock position also noted on ultrasound suspicious for malignancy. The patient was referred for nodule in the right upper outer breast.  The patient was seen by Dr. Lawrence 09/05/2018.  Examination showed small palpable abnormality at 10 and 11 o'clock positions, 0.5 cm to 1 cm in size. No adenopathy noted. Patient planned for excision and sentinel node evaluation.  Pathology report 09/11/2018: Invasive ductal carcinoma intermediate grade, 1 out of 2 sentinel lymph nodes positive for metastasis. The largest metastatic focus is 2.5 mm. Negative for extracapsular extension. Tumor measures 9 mm in greatest linear dimension. Margins of resection are negative. Tumor measures 2 mm from closest anterior margin. AJCC T1b, N1a. ER/AZ positive and HER-2/gregory negative.  CT abdomen and pelvis performed at [facility] on 09/21/2018 was revealing for IMPRESSION: 1. Post therapy changes right breast and axilla. 2. Linear nodularity left lung base, suspect chronic changes. 3. Otherwise, No CT evidence of definite metastatic disease in chest or acute cardiopulmonary process.   Bone scan performed at [facility] on 09/21/2018 was revealing for IMPRESSION: 1. Focal increased tracer activity identified in the left lateral mid-cervical spine, suspect degenerative changes. Plain radiographic imaging could further characterize. 2. Otherwise, No scintigraphic evidence of osseous metastases.   Ultrasound of liver performed at [facility] on 09/26/2018 was revealing for Summary:  Multiple hepatic cysts.      PREVIOUS INTERVENTIONS:   The patient had undergone  right breast lumpectomy with sentinel lymph node biopsy 09/10/2018 by Dr. Lawrence.  Adjuvant Taxotere and Cytoxan 09/25/2018 through 11/27/2018 at the [facility]. 4 cycles.   Adjuvant radiation completed 02/2019 by Dr. Williamson.  Adjuvant Femara 2.5 mg po daily, she declined.            Malignant neoplasm of upper-outer quadrant of right female breast    Initial Diagnosis    Malignant neoplasm of right female breast (CMS/HCC)     9/10/2018 Surgery    Final Diagnosis   1.  Breast, right, lumpectomy and sentinel lymph node resection:  Invasive ductal carcinoma, intermediate grade  Tumor measures 9 mm in greatest linear dimension  Margins of resection are negative  Tumor measures 2 mm from the closest, anterior, margin.     Positive for metastatic ductal carcinoma in one out of 2 sentinel lymph nodes (1/2)  The largest metastatic focus is 2.5 mm  Negative for extra capsular extension     AJCC cancer stage: pT1b, (sn)pN1a            9/21/2018 Imaging    Ct Chest With Contrast  1. Post therapy changes right breast and axilla.   2. Linear nodularity left lung base, suspect chronic changes.   3. Otherwise, No CT evidence of definite metastatic disease in chest or acute cardiopulmonary process.     Nm Bone Scan Whole Body  1. Focal increased tracer activity identified in the left lateral mid cervical spine, suspect degenerative changes. Plain radiographic imaging could further characterize.   2. Otherwise, No scintigraphic evidence of osseous metastases.     Ct Abdomen Pelvis With Contrast  1. Multiple low-attenuation hepatic lesions, hepatic cyst most likely.   2. Suspect uterine fibroid   3. Otherwise, No CT evidence of metastatic disease or acute intra-abdominal/pelvic pathological process.      9/25/2018 - 11/27/2018 Chemotherapy    Taxotere Cytoxan     1/15/2019 - 2/25/2019 Radiation    Radiation OncologyTreatment Course:  Adriana Samuels received 6000 cGy in 30 fractions to right breast, right supraclavicular area,  and right axilla via external beam radiation therapy.     10/28/2019 Imaging    Bilateral Diagnostic Mammogram:  IMPRESSION:  Posttherapeutic changes of the right breast are stable over  a one year time period and no suspicious findings are identified. The  patient is due to return in 12 months for bilateral digital mammograms.  BI-RADS Category 2, benign.     10/29/2020 Imaging    Bilateral Diagnostic Mammogram:  IMPRESSION:  1. No mammographic evidence of malignancy.  2. BI-RADS CATEGORY 2: Benign findings.  3. Recommend annual screening mammography.     Malignant neoplasm of upper-outer quadrant of left breast in female, estrogen receptor positive   1/25/2024 Initial Diagnosis    Malignant neoplasm of upper-outer quadrant of left breast in female, estrogen receptor positive     1/25/2024 Cancer Staged    Staging form: Breast, AJCC 8th Edition  - Pathologic stage from 1/25/2024: Stage IA (pT1c, pN0, cM0, G2, ER+, FL+, HER2-) - Signed by Nahid Linton MD on 1/25/2024         PAST MEDICAL HISTORY:  ALLERGIES:  Allergies   Allergen Reactions    Bactrim [Sulfamethoxazole-Trimethoprim] Rash    Cortisone Rash    Decadron [Dexamethasone] Hives    Hydrocortisone Hives     Oral or IV    Clindamycin/Lincomycin Hives    Erythromycin Nausea Only and Rash     CURRENT MEDICATIONS:  Outpatient Encounter Medications as of 3/3/2025   Medication Sig Dispense Refill    aspirin 81 MG chewable tablet Chew 1 tablet Daily.      Biotin 1 MG capsule Take 1 capsule by mouth Daily.      Calcium Carb-Cholecalciferol (CALCIUM 1000 + D PO) Take  by mouth Daily.      glucosamine sulfate 500 MG capsule capsule Take  by mouth 3 (Three) Times a Day With Meals.      letrozole (FEMARA) 2.5 MG tablet TAKE 1 TABLET BY MOUTH EVERY DAY 90 tablet 3    Multiple Vitamins-Minerals (MULTIVITAMIN WITH MINERALS) tablet tablet Take 1 tablet by mouth Daily.      Probiotic Product (PROBIOTIC PO) Take 1 capsule by mouth Daily.      [DISCONTINUED] letrozole  (FEMARA) 2.5 MG tablet Take 1 tablet by mouth Daily. 90 tablet 3     No facility-administered encounter medications on file as of 3/3/2025.     ADULT ILLNESSES:  Patient Active Problem List   Diagnosis Code    Malignant neoplasm of upper-outer quadrant of right female breast C50.411    Non-smoker Z78.9    S/P lumpectomy, right breast Z98.890    S/P lymph node biopsy Z98.890    History of radiation therapy Z92.3    Prophylactic use of letrozole (Femara) Z79.811    Screening mammogram for high-risk patient Z12.31    History of left breast cancer Z85.3    Malignant neoplasm of upper-outer quadrant of left breast in female, estrogen receptor positive C50.412, Z17.0     SURGERIES:  Past Surgical History:   Procedure Laterality Date    BREAST BIOPSY Right 09/2018    positive    BREAST LUMPECTOMY Right 09/2018    lumpectomy with chemo and rad tx    BREAST LUMPECTOMY WITH SENTINEL NODE BIOPSY Right 09/10/2018    Procedure: RIGHT BREAST LUMPECTOMY WITH SENTINEL LYMPH NODE BIOPSY, INJECTION AND SCAN;  Surgeon: Duglas Lawrence MD;  Location: [facility] OR;  Service: General    COLONOSCOPY N/A 06/06/2019    Procedure: COLONOSCOPY WITH ANESTHESIA;  Surgeon: Brock Raman MD;  Location: [facility] ENDOSCOPY;  Service: Gastroenterology    HYDROCELE EXCISION / REPAIR      LASER ABLATION OF THE CERVIX      MASTECTOMY WITH SENTINEL NODE BIOPSY AND AXILLARY NODE DISSECTION Bilateral 11/21/2023    Procedure: BREAST MASTECTOMY WITH left SENTINEL NODE BIOPSY AND AXILLARY NODE DISSECTION BILATERAL;  Surgeon: Duglas Lawrence MD;  Location: [facility] OR;  Service: General;  Laterality: Bilateral;    WRIST FUSION Left      HEALTH MAINTENANCE ITEMS:  Health Maintenance Due   Topic Date Due    TDAP/TD VACCINES (1 - Tdap) Never done    Pneumococcal Vaccine 50+ (1 of 1 - PCV) Never done    ZOSTER VACCINE (1 of 2) Never done    HEPATITIS C SCREENING  Never done    INFLUENZA VACCINE  07/01/2024    COVID-19 Vaccine (6 - 2024-25 season) 09/01/2024        <no information>  Last Completed Colonoscopy            COLORECTAL CANCER SCREENING (COLONOSCOPY - Every 10 Years) Tentatively due on 6/6/2029 06/06/2019  Surgical Procedure: COLONOSCOPY    06/06/2019  COLONOSCOPY                  Immunization History   Administered Date(s) Administered    COVID-19 (PFIZER) Purple Cap Monovalent 03/25/2021, 04/15/2021, 12/15/2021     Last Completed Mammogram            Discontinued - MAMMOGRAM  Discontinued        Frequency changed to Never automatically (Topic No Longer Applies)    11/03/2023  Mammo Diagnostic Digital Tomosynthesis Bilateral With CAD    11/02/2022  Mammo Diagnostic Digital Tomosynthesis Bilateral With CAD    11/01/2021  Mammo Diagnostic Digital Tomosynthesis Bilateral With CAD    10/29/2020  Mammo Diagnostic Digital Tomosynthesis Bilateral With CAD    Only the first 5 history entries have been loaded, but more history exists.                      FAMILY HISTORY:  Family History   Problem Relation Age of Onset    No Known Problems Mother     Skin cancer Father     No Known Problems Sister     No Known Problems Brother     No Known Problems Sister     No Known Problems Brother     No Known Problems Son     No Known Problems Son     No Known Problems Son     No Known Problems Daughter     No Known Problems Maternal Grandmother     No Known Problems Paternal Grandmother     No Known Problems Maternal Aunt     No Known Problems Paternal Aunt     No Known Problems Other     Breast cancer Neg Hx     Colon cancer Neg Hx     Colon polyps Neg Hx     BRCA 1/2 Neg Hx     Endometrial cancer Neg Hx     Ovarian cancer Neg Hx      SOCIAL HISTORY:  Social History     Socioeconomic History    Marital status:    Tobacco Use    Smoking status: Never     Passive exposure: Never    Smokeless tobacco: Never   Vaping Use    Vaping status: Never Used   Substance and Sexual Activity    Alcohol use: Not Currently     Comment: Rare occasions in past socially    Drug use:  "No    Sexual activity: Yes     Partners: Male     Birth control/protection: Post-menopausal     Comment: Spouse only       REVIEW OF SYSTEMS:    Review of Systems   Constitutional:  Negative for fatigue, fever and unexpected weight change.        \"Scars are good.  Nothing going on.\"   HENT:  Negative for congestion and hearing loss.    Eyes:  Negative for discharge and redness.   Respiratory:  Negative for shortness of breath and wheezing.    Cardiovascular:  Negative for chest pain and palpitations.   Gastrointestinal:  Negative for constipation, diarrhea, nausea and vomiting.   Endocrine: Negative for cold intolerance and heat intolerance.   Genitourinary:  Negative for difficulty urinating and dysuria.   Musculoskeletal:  Negative for gait problem and myalgias.   Skin:  Negative for pallor.   Allergic/Immunologic: Negative for food allergies.   Neurological:  Negative for dizziness, speech difficulty and weakness.   Hematological:  Negative for adenopathy. Does not bruise/bleed easily.   Psychiatric/Behavioral:  Negative for agitation and confusion. The patient is not nervous/anxious.        VITAL SIGNS: /64   Pulse 83   Temp 97.9 °F (36.6 °C)   Ht 167.6 cm (66\")   Wt 57.6 kg (127 lb)   SpO2 97%   Breastfeeding No   BMI 20.50 kg/m²   Pain Score    03/03/25 0829   PainSc: 0-No pain       PHYSICAL EXAMINATION:     Physical Exam  Vitals reviewed.   Constitutional:       General: She is not in acute distress.  HENT:      Head: Normocephalic and atraumatic.   Eyes:      General: No scleral icterus.  Cardiovascular:      Rate and Rhythm: Normal rate.   Pulmonary:      Effort: No respiratory distress.      Breath sounds: No wheezing.   Abdominal:      General: Bowel sounds are normal.      Palpations: Abdomen is soft.      Tenderness: There is no abdominal tenderness.   Musculoskeletal:         General: No swelling.      Cervical back: No rigidity.   Skin:     Coloration: Skin is not pale.   Neurological: "      Mental Status: She is alert and oriented to person, place, and time.   Psychiatric:         Mood and Affect: Mood normal.         Behavior: Behavior normal.         Thought Content: Thought content normal.         Judgment: Judgment normal.         LABS    Lab Results - Last 18 Months   Lab Units 02/13/25  0800 11/14/24  0817 08/15/24  0849 05/16/24  0801 02/22/24  0759 11/16/23  1207 10/12/23  0813   HEMOGLOBIN g/dL 13.0 13.5 13.0 13.1 13.7 12.7 13.7   HEMATOCRIT % 40.5 41.7 39.9 41.6 43.4 40.8 45.0   MCV fL 85.6 86.2 85.4 87.0 87.0 88.1 88.1   WBC 10*3/mm3 3.78 3.66 4.33 3.92 3.71 5.30 3.80   RDW % 12.9 13.2 13.2 13.5 12.9 13.3 13.2   MPV fL 9.6 9.7 9.7 9.8 9.8 9.9 9.8   PLATELETS 10*3/mm3 200 200 211 212 212 225 208   IMM GRAN % % 0.0 0.3 0.2 0.3 0.0  --  0.3   NEUTROS ABS 10*3/mm3 2.11 1.88 2.41 2.23 1.99  --  2.07   LYMPHS ABS 10*3/mm3 1.03 1.21 1.21 1.07 1.07  --  1.14   MONOS ABS 10*3/mm3 0.39 0.40 0.43 0.35 0.41  --  0.39   EOS ABS 10*3/mm3 0.20 0.12 0.21 0.20 0.19  --  0.14   BASOS ABS 10*3/mm3 0.05 0.04 0.06 0.06 0.05  --  0.05   IMMATURE GRANS (ABS) 10*3/mm3 0.00 0.01 0.01 0.01 0.00  --  0.01   NRBC /100 WBC 0.0 0.0 0.0 0.0 0.0  --  0.0       Lab Results - Last 18 Months   Lab Units 02/13/25  0800 11/14/24  0817 08/15/24  0849 05/16/24  0801 02/22/24  0759 11/16/23  1207 10/12/23  0813   GLUCOSE mg/dL 56* 91 96 74 88 108* 85   SODIUM mmol/L 142 143 142 143 143 143 142   POTASSIUM mmol/L 4.5 4.5 4.5 4.6 4.3 4.4 4.5   CO2 mmol/L 29.0 29.0 29.0 32.0* 32.0* 31.0* 29.0   CHLORIDE mmol/L 101 104 104 105 104 106 104   ANION GAP mmol/L 12.0 10.0 9.0 6.0 7.0 6.0 9.0   CREATININE mg/dL 0.52* 0.60 0.61 0.59 0.67 0.61 0.67   BUN mg/dL 17 14 15 16 14 15 12   BUN / CREAT RATIO  32.7* 23.3 24.6 27.1* 20.9 24.6 17.9   CALCIUM mg/dL 9.3 9.6 9.5 9.4 9.6 9.1 9.7   ALK PHOS U/L 75 76 75 75 78  --  75   TOTAL PROTEIN g/dL 6.9 6.7 7.0 6.9 7.2  --  7.1   ALT (SGPT) U/L 13 12 10 11 9  --  10   AST (SGOT) U/L 20 19 17 16 16   "--  17   BILIRUBIN mg/dL 0.6 0.5 0.6 0.5 0.6  --  0.6   ALBUMIN g/dL 4.4 4.3 4.4 4.5 4.5  --  4.5   GLOBULIN gm/dL 2.5 2.4 2.6 2.4 2.7  --  2.6       Lab Results - Last 18 Months   Lab Units 02/13/25  0800 11/14/24  0817 08/15/24  0849 05/16/24  0801 02/22/24  0759 10/12/23  0813   CEA ng/mL 2.07 1.99 1.91 1.92 2.03 2.22       No results for input(s): \"IRON\", \"TIBC\", \"LABIRON\", \"FERRITIN\", \"G1RHOWA\", \"TSH\", \"FOLATE\" in the last 43279 hours.    Invalid input(s): \"VITB12\"    Adriana Mohan Arvind reports a pain score of 0.          ASSESSMENT:  1.   Left breast cancer, upper outer quadrant, lower outer quadrant, multicentric. Tumor size 1.7 cm followed by 10 mm and 8 mm.  No lymphovascular invasion.  Negative margins. Oncotype DX score,  Less than 1% benefit for chemo.  3% distant recurrence risk at 9 years.  AJCC stage: 1a (pT1c, psnN0, cM0, G2).  Receptor status: Estrogen 95%, progesterone 84% and HER2/gregory negative by FISH.  Low Ki-67.  Treatment status:Patient had undergone bilateral mastectomy with sentinel lymph node resection from the left axilla on 11/21/2023.  Patient on therapy with adjuvant letrozole from 3/6/2024 through present.    2.   Performance status of 0.   3.   Self directed care.   4.   Osteopenia 11/3/2023.  She is on Caltrate D.              PLAN:  1.    Re: Tolerance to adjuvant letrozole. \"I am taking it.\"  2.    Re:  Heme status. WBC 3.78, hemoglobin 13 and platelet 200.  3.    Re:  Pre-office CMP.  .1 ml/min and CO2 at 29.  4.    Re:  Pre-office CEA  2.07 from 1.99 from 1.91 from 1.92 from 2.03 from 2.22.  5.    Re:  Pre-office CA27-29 at 20.2 from 19 from 15.5 from 12.7 from 13.8 from 14.  6.   Refer to genetic counseling for genetic testing. \"Don't want it.\"  7.   eRx for letrozole 2.5 mg p.o. daily, 60, 3 refills if needed.  Monitor for arthralgias, hair thinning, weight gain, or worsening bone loss. Plan for 5 years.   8.   Continue ongoing management " per primary care physician and the other specialists.  9.  Plan of care discussed with patient.  Understanding expressed.  Patient agreeable to proceed.  10.  Continue Caltrate D twice daily secondary to osteopenia.  11.  Order bone density 11/2025.   12.  No further mammograms, patient had bilateral mastectomy.   13.  The patient will be seen 1-4 times per year for 5 years.  14.  Return to the office in 3 months with CBC with differential, CMP, CEA and CA 27-29, use right arm, same day.   15.  Advance Care Planning   ACP discussion was held with the patient during this visit. Patient has an advance directive (not in EMR), copy requested.              I have reviewed the assessment and plan and verified the accuracy of it. No changes to assessment and plan since the information was documented. Nahid Linton MD 03/03/25           I spent 32 total minutes, face-to-face, caring for Adriana today. Greater than 50% of this time involved counseling and/or coordination of care as documented within this note.            cc: Arie Renteria MD        (Jonathan Williamson MD)

## 2025-03-03 ENCOUNTER — OFFICE VISIT (OUTPATIENT)
Dept: ONCOLOGY | Facility: CLINIC | Age: 70
End: 2025-03-03
Payer: MEDICARE

## 2025-03-03 VITALS
WEIGHT: 127 LBS | BODY MASS INDEX: 20.41 KG/M2 | TEMPERATURE: 97.9 F | HEIGHT: 66 IN | SYSTOLIC BLOOD PRESSURE: 124 MMHG | DIASTOLIC BLOOD PRESSURE: 64 MMHG | HEART RATE: 83 BPM | OXYGEN SATURATION: 97 %

## 2025-03-03 DIAGNOSIS — C50.411 MALIGNANT NEOPLASM OF UPPER-OUTER QUADRANT OF RIGHT BREAST IN FEMALE, ESTROGEN RECEPTOR POSITIVE: Primary | ICD-10-CM

## 2025-03-03 DIAGNOSIS — Z17.0 MALIGNANT NEOPLASM OF UPPER-OUTER QUADRANT OF RIGHT BREAST IN FEMALE, ESTROGEN RECEPTOR POSITIVE: Primary | ICD-10-CM

## 2025-05-29 ENCOUNTER — LAB (OUTPATIENT)
Dept: LAB | Facility: HOSPITAL | Age: 70
End: 2025-05-29
Payer: MEDICARE

## 2025-05-29 DIAGNOSIS — C50.411 MALIGNANT NEOPLASM OF UPPER-OUTER QUADRANT OF RIGHT BREAST IN FEMALE, ESTROGEN RECEPTOR POSITIVE: ICD-10-CM

## 2025-05-29 DIAGNOSIS — Z17.0 MALIGNANT NEOPLASM OF UPPER-OUTER QUADRANT OF RIGHT BREAST IN FEMALE, ESTROGEN RECEPTOR POSITIVE: ICD-10-CM

## 2025-05-29 LAB
ALBUMIN SERPL-MCNC: 4.2 G/DL (ref 3.5–5.2)
ALBUMIN/GLOB SERPL: 1.8 G/DL
ALP SERPL-CCNC: 80 U/L (ref 39–117)
ALT SERPL W P-5'-P-CCNC: 10 U/L (ref 1–33)
ANION GAP SERPL CALCULATED.3IONS-SCNC: 7 MMOL/L (ref 5–15)
AST SERPL-CCNC: 19 U/L (ref 1–32)
BASOPHILS # BLD AUTO: 0.07 10*3/MM3 (ref 0–0.2)
BASOPHILS NFR BLD AUTO: 1.7 % (ref 0–1.5)
BILIRUB SERPL-MCNC: 0.5 MG/DL (ref 0–1.2)
BUN SERPL-MCNC: 18.4 MG/DL (ref 8–23)
BUN/CREAT SERPL: 29.7 (ref 7–25)
CALCIUM SPEC-SCNC: 9.4 MG/DL (ref 8.6–10.5)
CEA SERPL-MCNC: 2.26 NG/ML
CHLORIDE SERPL-SCNC: 104 MMOL/L (ref 98–107)
CO2 SERPL-SCNC: 30 MMOL/L (ref 22–29)
CREAT SERPL-MCNC: 0.62 MG/DL (ref 0.57–1)
DEPRECATED RDW RBC AUTO: 41.6 FL (ref 37–54)
EGFRCR SERPLBLD CKD-EPI 2021: 95.9 ML/MIN/1.73
EOSINOPHIL # BLD AUTO: 0.18 10*3/MM3 (ref 0–0.4)
EOSINOPHIL NFR BLD AUTO: 4.3 % (ref 0.3–6.2)
ERYTHROCYTE [DISTWIDTH] IN BLOOD BY AUTOMATED COUNT: 13.2 % (ref 12.3–15.4)
GLOBULIN UR ELPH-MCNC: 2.4 GM/DL
GLUCOSE SERPL-MCNC: 72 MG/DL (ref 65–99)
HCT VFR BLD AUTO: 41.2 % (ref 34–46.6)
HGB BLD-MCNC: 13.2 G/DL (ref 12–15.9)
IMM GRANULOCYTES # BLD AUTO: 0.01 10*3/MM3 (ref 0–0.05)
IMM GRANULOCYTES NFR BLD AUTO: 0.2 % (ref 0–0.5)
LYMPHOCYTES # BLD AUTO: 1.33 10*3/MM3 (ref 0.7–3.1)
LYMPHOCYTES NFR BLD AUTO: 32 % (ref 19.6–45.3)
MCH RBC QN AUTO: 27.3 PG (ref 26.6–33)
MCHC RBC AUTO-ENTMCNC: 32 G/DL (ref 31.5–35.7)
MCV RBC AUTO: 85.1 FL (ref 79–97)
MONOCYTES # BLD AUTO: 0.42 10*3/MM3 (ref 0.1–0.9)
MONOCYTES NFR BLD AUTO: 10.1 % (ref 5–12)
NEUTROPHILS NFR BLD AUTO: 2.14 10*3/MM3 (ref 1.7–7)
NEUTROPHILS NFR BLD AUTO: 51.7 % (ref 42.7–76)
NRBC BLD AUTO-RTO: 0 /100 WBC (ref 0–0.2)
PLATELET # BLD AUTO: 195 10*3/MM3 (ref 140–450)
PMV BLD AUTO: 9.4 FL (ref 6–12)
POTASSIUM SERPL-SCNC: 4.3 MMOL/L (ref 3.5–5.2)
PROT SERPL-MCNC: 6.6 G/DL (ref 6–8.5)
RBC # BLD AUTO: 4.84 10*6/MM3 (ref 3.77–5.28)
SODIUM SERPL-SCNC: 141 MMOL/L (ref 136–145)
WBC NRBC COR # BLD AUTO: 4.15 10*3/MM3 (ref 3.4–10.8)

## 2025-05-29 PROCEDURE — 82378 CARCINOEMBRYONIC ANTIGEN: CPT

## 2025-05-29 PROCEDURE — 80053 COMPREHEN METABOLIC PANEL: CPT

## 2025-05-29 PROCEDURE — 86300 IMMUNOASSAY TUMOR CA 15-3: CPT

## 2025-05-29 PROCEDURE — 85025 COMPLETE CBC W/AUTO DIFF WBC: CPT

## 2025-05-29 PROCEDURE — 36415 COLL VENOUS BLD VENIPUNCTURE: CPT

## 2025-05-30 LAB — CANCER AG27-29 SERPL-ACNC: 20 U/ML (ref 0–38.6)

## 2025-06-13 NOTE — PROGRESS NOTES
[facility] HEMATOLOGY & ONCOLOGY  [address]  [place] [address] [phone]    Patient Name: Adriana Samuels  Encounter Date: 06/23/2025  YOB: 1955  Patient Number: [id number]      REASON FOR FOLLOW-UP: Ms. Adriana Samuels is a pleasant 69 year-old  female, post menopausal, who is seen on followup for Stage IIA left breast cancer, multicentric, upper outer quadrant and right lower outer quadrant, hormone receptor positive.  Patient is taking adjuvant letrozole on 3/6/2024 through present.   She is seen 78.75 months from 4 cycles of adjuvant docetaxel and cyclophosphamide from previous stage IIa right breast cancer in 2018.  Patient is seen alone.  She is a reliable historian.           Pertinent history.  1.   Elevated CEA, history of.    2.   Rash from Decadron.  3.  Carcinoma of the right upper outer breast. Invasive ductal carcinoma:  Stage: AJCC IIA (pT1b, pN1a, M0, G2)   Tumor size 9 mm.  Hormone Status: %, RI 86%, and negative HER-2/gregory.  Baseline Node Status: 1/2 sentinel node positive for metastasis.   Complications of Tumor: None.   Treatment status: Post adjuvant docetaxel and cyclophosphamide.   Post adjuvant radiation 02/2019.   Declined adjuvant Femara.   Prognosis: Fair.  4.  Ductal carcinoma in situ, left, postmastectomy.            Oncology/Hematology History Overview Note   DIAGNOSTIC ABNORMALITIES:  Mammogram 11/3/2023.  Suspicious microcalcifications upper outer left breast. Biopsy is recommended. BI-RADS 4. Stable postoperative changes of the right breast.    Sonogram guided biopsy 11/8/2023.  Sonography demonstrates a hypoechoic lesion with irregular margins in the upper outer quadrant of the left breast corresponding to the lesion noted on recent mammography. A total of 6 vacuum assisted core specimens were obtained the majority of which contain calcifications. A HydroMARK clip was left in  "place.  Also demonstrated was a 3 x 4 mm solid nodule with lobular margins approximate 1.2 cm medial to the larger lesion within the left breast. This was biopsied using a Monopty biopsy gun with a total of 6 core specimens obtained. This portion of the biopsy was somewhat difficult as the nodule was noted to well superiorly and inferiorly upon attempted firing of the instrument. There were at least 2 biopsies which I feel penetrated the central aspect of the lesion. A true michael X clip was left adjacent to this nodule.  Pathology report 11/13/2023.  Left breast, designated \"mass/distortion 2:00\", 4 cm from the nipple, biopsy:Invasive carcinoma of the breast, no special type, with areas of lobular pattern growth and the following features: Modified Redlake grade 2 (score 3 for tubules, score 2 for nuclear grade, score 1 for mitoses).Lymphovascular space invasion: Not identified.Ductal carcinoma in situ: Present, solid and cribriform growth patterns, intermediate nuclear grade with single cell necrosis and calcifications. Extent: Tumor involves 6 of 6 cores, largest span 14 mm. Additional findings: Calcifications associated with invasive carcinoma. Left breast, designated \"mass 2:00\", 3 cm from the nipple, biopsy:  Invasive carcinoma of the breast, no special type, with the following features: Modified Graciela grade 2 (score 3 for tubules, score 2 for nuclear grade, score 1 for mitoses).Lymphovascular space invasion: Not identified.Ductal carcinoma in situ: Not identified.Extent: 3 of 6 fragmented cores, largest span 2 mm.    Pathology report 11/22/2023.1.  Right breast, mastectomy: Nonneoplastic breast with prior surgical site identified.No residual/recurrent tumor identified.  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 Associated intermediate grade ductal carcinoma in situ, solid and cribriform type.Surgical resection margins free of invasive and in situ " carcinoma.AJCC pathologic stage:  pT1c(m) N0(sn).            PREVIOUS INTERVENTIONS:  Patient had undergone bilateral mastectomy with sentinel lymph node resection from the left axilla on 11/21/2023.  Adjuvant letrozole on 3/6/2024 through present.          DIAGNOSTIC ABNORMALITIES:   The patient had a mammogram from [facility] that showed a lesion 5.6 mm at the 10 o'clock position also noted on ultrasound suspicious for malignancy. The patient was referred for nodule in the right upper outer breast.  The patient was seen by Dr. Lawrence 09/05/2018.  Examination showed small palpable abnormality at 10 and 11 o'clock positions, 0.5 cm to 1 cm in size. No adenopathy noted. Patient planned for excision and sentinel node evaluation.  Pathology report 09/11/2018: Invasive ductal carcinoma intermediate grade, 1 out of 2 sentinel lymph nodes positive for metastasis. The largest metastatic focus is 2.5 mm. Negative for extracapsular extension. Tumor measures 9 mm in greatest linear dimension. Margins of resection are negative. Tumor measures 2 mm from closest anterior margin. AJCC T1b, N1a. ER/DE positive and HER-2/gregory negative.  CT abdomen and pelvis performed at [facility] on 09/21/2018 was revealing for IMPRESSION: 1. Post therapy changes right breast and axilla. 2. Linear nodularity left lung base, suspect chronic changes. 3. Otherwise, No CT evidence of definite metastatic disease in chest or acute cardiopulmonary process.   Bone scan performed at [facility], [place], operated by [facility] on 09/21/2018 was revealing for IMPRESSION: 1. Focal increased tracer activity identified in the left lateral mid-cervical spine, suspect degenerative changes. Plain radiographic imaging could further characterize. 2. Otherwise, No scintigraphic evidence of osseous metastases.   Ultrasound of liver performed at [facility], [place], operated by [facility] on 09/26/2018 was revealing for Summary:  Multiple hepatic cysts.      PREVIOUS INTERVENTIONS:   The patient had undergone  right breast lumpectomy with sentinel lymph node biopsy 09/10/2018 by Dr. Lawrence.  Adjuvant Taxotere and Cytoxan 09/25/2018 through 11/27/2018 at the [facility]. 4 cycles.   Adjuvant radiation completed 02/2019 by Dr. Williamson.  Adjuvant Femara 2.5 mg po daily, she declined.            Malignant neoplasm of upper-outer quadrant of right female breast    Initial Diagnosis    Malignant neoplasm of right female breast (CMS/HCC)     9/10/2018 Surgery    Final Diagnosis   1.  Breast, right, lumpectomy and sentinel lymph node resection:  Invasive ductal carcinoma, intermediate grade  Tumor measures 9 mm in greatest linear dimension  Margins of resection are negative  Tumor measures 2 mm from the closest, anterior, margin.     Positive for metastatic ductal carcinoma in one out of 2 sentinel lymph nodes (1/2)  The largest metastatic focus is 2.5 mm  Negative for extra capsular extension     AJCC cancer stage: pT1b, (sn)pN1a            9/21/2018 Imaging    Ct Chest With Contrast  1. Post therapy changes right breast and axilla.   2. Linear nodularity left lung base, suspect chronic changes.   3. Otherwise, No CT evidence of definite metastatic disease in chest or acute cardiopulmonary process.     Nm Bone Scan Whole Body  1. Focal increased tracer activity identified in the left lateral mid cervical spine, suspect degenerative changes. Plain radiographic imaging could further characterize.   2. Otherwise, No scintigraphic evidence of osseous metastases.     Ct Abdomen Pelvis With Contrast  1. Multiple low-attenuation hepatic lesions, hepatic cyst most likely.   2. Suspect uterine fibroid   3. Otherwise, No CT evidence of metastatic disease or acute intra-abdominal/pelvic pathological process.      9/25/2018 - 11/27/2018 Chemotherapy    Taxotere Cytoxan     1/15/2019 - 2/25/2019 Radiation    Radiation OncologyTreatment Course:  Adriana Samuels received 6000 cGy in 30 fractions to right breast, right supraclavicular area,  and right axilla via external beam radiation therapy.     10/28/2019 Imaging    Bilateral Diagnostic Mammogram:  IMPRESSION:  Posttherapeutic changes of the right breast are stable over  a one year time period and no suspicious findings are identified. The  patient is due to return in 12 months for bilateral digital mammograms.  BI-RADS Category 2, benign.     10/29/2020 Imaging    Bilateral Diagnostic Mammogram:  IMPRESSION:  1. No mammographic evidence of malignancy.  2. BI-RADS CATEGORY 2: Benign findings.  3. Recommend annual screening mammography.     Malignant neoplasm of upper-outer quadrant of left breast in female, estrogen receptor positive   1/25/2024 Initial Diagnosis    Malignant neoplasm of upper-outer quadrant of left breast in female, estrogen receptor positive     1/25/2024 Cancer Staged    Staging form: Breast, AJCC 8th Edition  - Pathologic stage from 1/25/2024: Stage IA (pT1c, pN0, cM0, G2, ER+, AK+, HER2-) - Signed by Nahid Linton MD on 1/25/2024         PAST MEDICAL HISTORY:  ALLERGIES:  Allergies   Allergen Reactions    Bactrim [Sulfamethoxazole-Trimethoprim] Rash    Cortisone Rash    Decadron [Dexamethasone] Hives    Hydrocortisone Hives     Oral or IV    Clindamycin/Lincomycin Hives    Erythromycin Nausea Only and Rash     CURRENT MEDICATIONS:  Outpatient Encounter Medications as of 6/23/2025   Medication Sig Dispense Refill    aspirin 81 MG chewable tablet Chew 1 tablet Daily.      Biotin 1 MG capsule Take 1 capsule by mouth Daily.      Calcium Carb-Cholecalciferol (CALCIUM 1000 + D PO) Take  by mouth Daily.      glucosamine sulfate 500 MG capsule capsule Take  by mouth 3 (Three) Times a Day With Meals.      letrozole (FEMARA) 2.5 MG tablet TAKE 1 TABLET BY MOUTH EVERY DAY 90 tablet 3    Multiple Vitamins-Minerals (MULTIVITAMIN WITH MINERALS) tablet tablet Take 1 tablet by mouth Daily.      Probiotic Product (PROBIOTIC PO) Take 1 capsule by mouth Daily.       No facility-administered  encounter medications on file as of 6/23/2025.     ADULT ILLNESSES:  Patient Active Problem List   Diagnosis Code    Malignant neoplasm of upper-outer quadrant of right female breast C50.411    Non-smoker Z78.9    S/P lumpectomy, right breast Z98.890    S/P lymph node biopsy Z98.890    History of radiation therapy Z92.3    Prophylactic use of letrozole (Femara) Z79.811    Screening mammogram for high-risk patient Z12.31    History of left breast cancer Z85.3    Malignant neoplasm of upper-outer quadrant of left breast in female, estrogen receptor positive C50.412, Z17.0     SURGERIES:  Past Surgical History:   Procedure Laterality Date    BREAST BIOPSY Right 09/2018    positive    BREAST LUMPECTOMY Right 09/2018    lumpectomy with chemo and rad tx    BREAST LUMPECTOMY WITH SENTINEL NODE BIOPSY Right 09/10/2018    Procedure: RIGHT BREAST LUMPECTOMY WITH SENTINEL LYMPH NODE BIOPSY, INJECTION AND SCAN;  Surgeon: Duglas Lawrence MD;  Location: [facility] OR;  Service: General    COLONOSCOPY N/A 06/06/2019    Procedure: COLONOSCOPY WITH ANESTHESIA;  Surgeon: Brock Raman MD;  Location: [facility] ENDOSCOPY;  Service: Gastroenterology    HYDROCELE EXCISION / REPAIR      LASER ABLATION OF THE CERVIX      MASTECTOMY WITH SENTINEL NODE BIOPSY AND AXILLARY NODE DISSECTION Bilateral 11/21/2023    Procedure: BREAST MASTECTOMY WITH left SENTINEL NODE BIOPSY AND AXILLARY NODE DISSECTION BILATERAL;  Surgeon: Duglas Lawrence MD;  Location: [facility] OR;  Service: General;  Laterality: Bilateral;    WRIST FUSION Left      HEALTH MAINTENANCE ITEMS:  Health Maintenance Due   Topic Date Due    TDAP/TD VACCINES (1 - Tdap) Never done    ZOSTER VACCINE (1 of 2) Never done    HEPATITIS C SCREENING  Never done    COVID-19 Vaccine (6 - 2024-25 season) 09/01/2024    ANNUAL WELLNESS VISIT  09/10/2025       <no information>  Last Completed Colonoscopy            Needs Review       COLORECTAL CANCER SCREENING (COLONOSCOPY - Every 10 Years)  Tentatively due on 6/6/2029 06/06/2019  COLONOSCOPY    06/06/2019  Surgical Procedure: COLONOSCOPY                          Immunization History   Administered Date(s) Administered    COVID-19 (PFIZER) Purple Cap Monovalent 03/25/2021, 04/15/2021, 12/15/2021     Last Completed Mammogram            Completed or No Longer Recommended       MAMMOGRAM  Discontinued        Frequency changed to Never automatically (Topic No Longer Applies)    11/03/2023  Mammo Diagnostic Digital Tomosynthesis Bilateral With CAD    11/02/2022  Mammo Diagnostic Digital Tomosynthesis Bilateral With CAD    11/01/2021  Mammo Diagnostic Digital Tomosynthesis Bilateral With CAD    10/29/2020  Mammo Diagnostic Digital Tomosynthesis Bilateral With CAD     Only the first 5 history entries have been loaded, but more history exists.                            FAMILY HISTORY:  Family History   Problem Relation Age of Onset    No Known Problems Mother     Skin cancer Father     No Known Problems Sister     No Known Problems Brother     No Known Problems Sister     No Known Problems Brother     No Known Problems Son     No Known Problems Son     No Known Problems Son     No Known Problems Daughter     No Known Problems Maternal Grandmother     No Known Problems Paternal Grandmother     No Known Problems Maternal Aunt     No Known Problems Paternal Aunt     No Known Problems Other     Breast cancer Neg Hx     Colon cancer Neg Hx     Colon polyps Neg Hx     BRCA 1/2 Neg Hx     Endometrial cancer Neg Hx     Ovarian cancer Neg Hx      SOCIAL HISTORY:  Social History     Socioeconomic History    Marital status:    Tobacco Use    Smoking status: Never     Passive exposure: Never    Smokeless tobacco: Never   Vaping Use    Vaping status: Never Used   Substance and Sexual Activity    Alcohol use: Not Currently     Comment: Rare occasions in past socially    Drug use: No    Sexual activity: Yes     Partners: Male     Birth control/protection:  "Post-menopausal     Comment: Spouse only       REVIEW OF SYSTEMS:    Review of Systems   Constitutional:  Negative for fatigue, fever and unexpected weight change.        \"I feel good.\"   HENT:  Negative for congestion and mouth sores.    Eyes:  Negative for discharge and redness.   Respiratory:  Negative for shortness of breath and wheezing.    Cardiovascular:  Negative for chest pain and leg swelling.   Gastrointestinal:  Negative for constipation, diarrhea, nausea and vomiting.   Endocrine: Negative for cold intolerance and heat intolerance.   Genitourinary:  Negative for difficulty urinating and dysuria.   Musculoskeletal:  Negative for gait problem and myalgias.   Skin:  Negative for pallor.   Allergic/Immunologic: Negative for food allergies.   Neurological:  Negative for dizziness, speech difficulty and weakness.   Hematological:  Negative for adenopathy. Does not bruise/bleed easily.   Psychiatric/Behavioral:  Negative for agitation, confusion and hallucinations.        VITAL SIGNS: /64   Pulse 86   Temp 98.4 °F (36.9 °C)   Resp 16   Ht 167.6 cm (66\")   Wt 58.6 kg (129 lb 1.6 oz)   SpO2 99%   Breastfeeding No   BMI 20.84 kg/m²   Pain Score    06/23/25 0811   PainSc: 0-No pain       PHYSICAL EXAMINATION:     Physical Exam  Vitals reviewed.   Constitutional:       General: She is not in acute distress.  HENT:      Head: Normocephalic and atraumatic.   Eyes:      General: No scleral icterus.  Cardiovascular:      Rate and Rhythm: Normal rate.   Pulmonary:      Effort: No respiratory distress.      Breath sounds: No wheezing.   Abdominal:      General: There is no distension.      Palpations: Abdomen is soft.   Musculoskeletal:         General: No swelling.   Skin:     Coloration: Skin is not pale.   Neurological:      Mental Status: She is alert and oriented to person, place, and time.   Psychiatric:         Mood and Affect: Mood normal.         Behavior: Behavior normal.         Thought Content: " "Thought content normal.         Judgment: Judgment normal.         LABS    Lab Results - Last 18 Months   Lab Units 05/29/25  0758 02/13/25  0800 11/14/24  0817 08/15/24  0849 05/16/24  0801 02/22/24  0759   HEMOGLOBIN g/dL 13.2 13.0 13.5 13.0 13.1 13.7   HEMATOCRIT % 41.2 40.5 41.7 39.9 41.6 43.4   MCV fL 85.1 85.6 86.2 85.4 87.0 87.0   WBC 10*3/mm3 4.15 3.78 3.66 4.33 3.92 3.71   RDW % 13.2 12.9 13.2 13.2 13.5 12.9   MPV fL 9.4 9.6 9.7 9.7 9.8 9.8   PLATELETS 10*3/mm3 195 200 200 211 212 212   IMM GRAN % % 0.2 0.0 0.3 0.2 0.3 0.0   NEUTROS ABS 10*3/mm3 2.14 2.11 1.88 2.41 2.23 1.99   LYMPHS ABS 10*3/mm3 1.33 1.03 1.21 1.21 1.07 1.07   MONOS ABS 10*3/mm3 0.42 0.39 0.40 0.43 0.35 0.41   EOS ABS 10*3/mm3 0.18 0.20 0.12 0.21 0.20 0.19   BASOS ABS 10*3/mm3 0.07 0.05 0.04 0.06 0.06 0.05   IMMATURE GRANS (ABS) 10*3/mm3 0.01 0.00 0.01 0.01 0.01 0.00   NRBC /100 WBC 0.0 0.0 0.0 0.0 0.0 0.0       Lab Results - Last 18 Months   Lab Units 05/29/25 0758 02/13/25  0800 11/14/24  0817 08/15/24  0849 05/16/24  0801 02/22/24  0759   GLUCOSE mg/dL 72 56* 91 96 74 88   SODIUM mmol/L 141 142 143 142 143 143   POTASSIUM mmol/L 4.3 4.5 4.5 4.5 4.6 4.3   CO2 mmol/L 30.0* 29.0 29.0 29.0 32.0* 32.0*   CHLORIDE mmol/L 104 101 104 104 105 104   ANION GAP mmol/L 7.0 12.0 10.0 9.0 6.0 7.0   CREATININE mg/dL 0.62 0.52* 0.60 0.61 0.59 0.67   BUN mg/dL 18.4 17 14 15 16 14   BUN / CREAT RATIO  29.7* 32.7* 23.3 24.6 27.1* 20.9   CALCIUM mg/dL 9.4 9.3 9.6 9.5 9.4 9.6   ALK PHOS U/L 80 75 76 75 75 78   TOTAL PROTEIN g/dL 6.6 6.9 6.7 7.0 6.9 7.2   ALT (SGPT) U/L 10 13 12 10 11 9   AST (SGOT) U/L 19 20 19 17 16 16   BILIRUBIN mg/dL 0.5 0.6 0.5 0.6 0.5 0.6   ALBUMIN g/dL 4.2 4.4 4.3 4.4 4.5 4.5   GLOBULIN gm/dL 2.4 2.5 2.4 2.6 2.4 2.7       Lab Results - Last 18 Months   Lab Units 05/29/25  0758 02/13/25  0800 11/14/24  0817 08/15/24  0849 05/16/24  0801 02/22/24  0759   CEA ng/mL 2.26 2.07 1.99 1.91 1.92 2.03       No results for input(s): \"IRON\", " "\"TIBC\", \"LABIRON\", \"FERRITIN\", \"A2YWTQA\", \"TSH\", \"FOLATE\" in the last 97784 hours.    Invalid input(s): \"VITB12\"    Adriana Samuels reports a pain score of 0.            ASSESSMENT:  1.   Left breast cancer, upper outer quadrant, lower outer quadrant, multicentric. Tumor size 1.7 cm followed by 10 mm and 8 mm.  No lymphovascular invasion.  Negative margins. Oncotype DX score,  Less than 1% benefit for chemo.  3% distant recurrence risk at 9 years.  AJCC stage: 1a (pT1c, psnN0, cM0, G2).  Receptor status: Estrogen 95%, progesterone 84% and HER2/gregory negative by FISH.  Low Ki-67.  Treatment status:Patient had undergone bilateral mastectomy with sentinel lymph node resection from the left axilla on 11/21/2023.  Patient on therapy with adjuvant letrozole from 3/6/2024 through present.    2.   Performance status of 0.   3.   Self directed care.   4.   Osteopenia 11/3/2023.  Patient is taking Caltrate D.              PLAN:  1.    Re: Tolerance to adjuvant letrozole. \"Hair thinning.\"  2.    Re:  Heme status. WBC 4.15, hemoglobin 13.2 and platelet 195.  3.    Re:  Pre-office CMP.  GFR 95.9 ml/min and CO2 at 30.  4.    Re:  Pre-office CEA 2.26 from 2.07 from 1.99 from 1.91 from 1.92 from 2.03 from 2.22.  5.    Re:  Pre-office CA27-29 at 20 from 20.2 from 19 from 15.5 from 12.7 from 13.8 from 14.  6.   Refer to genetic counseling for genetic testing. She declined. \"No.\"  7.   eRx for letrozole 2.5 mg p.o. daily, 60, 3 refills if needed.  Monitor for worsening bone loss, arthralgias, weight gain or thinning of the hair. Plan for 5 years.   8.   Continue ongoing management per primary care physician and the other specialists.  9.  Plan of care discussed with patient.  Understanding expressed.  She is agreeable to proceed.  10.  She will continue Caltrate D twice daily secondary to osteopenia.  11.  Schedule next bone density 11/2025.   12.  No further mammograms, patient had bilateral " mastectomy.   13.  The patient will be seen 1-4 times per year for 5 years.  14.  Return to the office in 3 months with CBC with differential, CMP, CEA and CA 27-29, use right arm, same day.   15.  Advance Care Planning  ACP discussion was held with the patient during this visit. Patient has an advance directive (not in EMR), copy requested.           I have reviewed the assessment and plan and verified the accuracy of it. No changes to assessment and plan since the information was documented. Nahid Linton MD 06/23/25          I spent 31 total minutes, face-to-face, caring for Adriana today. Greater than 50% of this time involved counseling and/or coordination of care as documented within this note.             cc: Arie Renteria MD        (Jonathan Williamson MD)

## 2025-06-23 ENCOUNTER — OFFICE VISIT (OUTPATIENT)
Dept: ONCOLOGY | Facility: CLINIC | Age: 70
End: 2025-06-23
Payer: MEDICARE

## 2025-06-23 VITALS
SYSTOLIC BLOOD PRESSURE: 116 MMHG | DIASTOLIC BLOOD PRESSURE: 64 MMHG | TEMPERATURE: 98.4 F | HEIGHT: 66 IN | BODY MASS INDEX: 20.75 KG/M2 | WEIGHT: 129.1 LBS | RESPIRATION RATE: 16 BRPM | OXYGEN SATURATION: 99 % | HEART RATE: 86 BPM

## 2025-06-23 DIAGNOSIS — Z17.0 MALIGNANT NEOPLASM OF UPPER-OUTER QUADRANT OF RIGHT BREAST IN FEMALE, ESTROGEN RECEPTOR POSITIVE: Primary | ICD-10-CM

## 2025-06-23 DIAGNOSIS — C50.411 MALIGNANT NEOPLASM OF UPPER-OUTER QUADRANT OF RIGHT BREAST IN FEMALE, ESTROGEN RECEPTOR POSITIVE: Primary | ICD-10-CM

## 2025-06-23 PROCEDURE — 1160F RVW MEDS BY RX/DR IN RCRD: CPT | Performed by: INTERNAL MEDICINE

## 2025-06-23 PROCEDURE — G2211 COMPLEX E/M VISIT ADD ON: HCPCS | Performed by: INTERNAL MEDICINE

## 2025-06-23 PROCEDURE — 1159F MED LIST DOCD IN RCRD: CPT | Performed by: INTERNAL MEDICINE

## 2025-06-23 PROCEDURE — 99214 OFFICE O/P EST MOD 30 MIN: CPT | Performed by: INTERNAL MEDICINE

## 2025-06-23 PROCEDURE — 1126F AMNT PAIN NOTED NONE PRSNT: CPT | Performed by: INTERNAL MEDICINE

## (undated) DEVICE — CUFF,BP,DISP,1 TUBE,ADULT,HP: Brand: MEDLINE

## (undated) DEVICE — SUT SILK 2/0 SH CR8 18IN CR8 C012D

## (undated) DEVICE — RESERVOIR,SUCTION,100CC,SILICONE: Brand: MEDLINE

## (undated) DEVICE — STCKNT IMPERV 9X36IN STRL

## (undated) DEVICE — 3M™ IOBAN™ 2 ANTIMICROBIAL INCISE DRAPE 6650EZ: Brand: IOBAN™ 2

## (undated) DEVICE — HARMONIC FOCUS SHEARS 9CM LENGTH + ADAPTIVE TISSUE TECHNOLOGY FOR USE WITH BLUE HAND PIECE ONLY: Brand: HARMONIC FOCUS

## (undated) DEVICE — VAGINAL PREP TRAY: Brand: MEDLINE INDUSTRIES, INC.

## (undated) DEVICE — SUT SILK 2/0 SUTUPAK TIES 24IN SA75H

## (undated) DEVICE — Device: Brand: DEFENDO AIR/WATER/SUCTION AND BIOPSY VALVE

## (undated) DEVICE — SUT VIC 3/0 RB1 27IN UD VCP215H

## (undated) DEVICE — ENDOGATOR AUXILIARY WATER JET CONNECTOR: Brand: ENDOGATOR

## (undated) DEVICE — SUT SILK 3/0 SH CR8 18IN C013D

## (undated) DEVICE — SUT VIC 4/0 PC3 18IN UD VCP845G

## (undated) DEVICE — ANTIBACTERIAL VIOLET BRAIDED (POLYGLACTIN 910), SYNTHETIC ABSORBABLE SUTURE: Brand: COATED VICRYL

## (undated) DEVICE — ELECTRD BLD EZ CLN MOD XLNG 2.75IN

## (undated) DEVICE — SUT SILK 3/0 SUTUPAK TIES 24IN SA74H

## (undated) DEVICE — SPNG DISSCT SECTO KTTNER PK/5

## (undated) DEVICE — SUT ETHLN 3/0 FS1 30IN 669H

## (undated) DEVICE — IRRIGATOR BULB ASEPTO 60CC STRL

## (undated) DEVICE — TBG SMPL FLTR LINE NASL 02/C02 A/ BX/100

## (undated) DEVICE — DRN WND HUBLSS FLUT FULL PERF SIL10MM

## (undated) DEVICE — CVR PROB GEN PURP W ISOSILK 6X48

## (undated) DEVICE — BNDG ELAS CO-FLEX SLF ADHR 6IN 5YD LF STRL

## (undated) DEVICE — ELECTRD BLD EDGE/INSUL1P 2.4X5.1MM STRL

## (undated) DEVICE — PK TURNOVER RM ADV

## (undated) DEVICE — 3M™ STERI-STRIP™ REINFORCED ADHESIVE SKIN CLOSURES, R1546, 1/4 IN X 4 IN (6 MM X 100 MM), 10 STRIPS/ENVELOPE: Brand: 3M™ STERI-STRIP™

## (undated) DEVICE — STRIP CLS WND CURAD MEDI/STRIP HYPOALLERG 0.25X4IN PK/10

## (undated) DEVICE — DRSNG SURESITE123 4X10IN

## (undated) DEVICE — SHEET, T, LAPAROTOMY, STERILE: Brand: MEDLINE

## (undated) DEVICE — GLV SURG TRIUMPH MICRO PF LTX 7.5 STRL

## (undated) DEVICE — THE CHANNEL CLEANING BRUSH IS A NYLON FLEXI BRUSH ATTACHED TO A FLEXIBLE PLASTIC SHEATH DESIGNED TO SAFELY REMOVE DEBRIS FROM FLEXIBLE ENDOSCOPES.

## (undated) DEVICE — PAD MINOR UNIVERSAL: Brand: MEDLINE INDUSTRIES, INC.

## (undated) DEVICE — TP SXN YANKR BULB STRL

## (undated) DEVICE — 4-PORT MANIFOLD: Brand: NEPTUNE 2

## (undated) DEVICE — DRSNG SURESITE WNDW 4X4.5

## (undated) DEVICE — STERILE (14X122CM) TELESCOPICALLY-FOLDED COVER: Brand: CIV-CLEAR™ TRANSDUCER COVER

## (undated) DEVICE — YANKAUER,BULB TIP WITH VENT: Brand: ARGYLE

## (undated) DEVICE — ADHS LIQ MASTISOL 2/3ML

## (undated) DEVICE — TRY PREP SCRB VAG PVP

## (undated) DEVICE — SPNG GZ WOVN 4X4IN 12PLY 10/BX STRL

## (undated) DEVICE — SUT VIC 4/0 P3 18IN UD VCP494H

## (undated) DEVICE — TOWEL,OR,DSP,ST,BLUE,STD,4/PK,20PK/CS: Brand: MEDLINE

## (undated) DEVICE — MASK,OXYGEN,MED CONC,ADLT,7' TUB, UC: Brand: PENDING

## (undated) DEVICE — SENSR O2 OXIMAX FNGR A/ 18IN NONSTR

## (undated) DEVICE — BNDR ABD 4PANEL12IN 30TO45IN

## (undated) DEVICE — TRAP FLD MINIVAC MEGADYNE 100ML

## (undated) DEVICE — PAD MAJOR: Brand: MEDLINE INDUSTRIES, INC.